# Patient Record
Sex: MALE | Race: BLACK OR AFRICAN AMERICAN | Employment: FULL TIME | URBAN - METROPOLITAN AREA
[De-identification: names, ages, dates, MRNs, and addresses within clinical notes are randomized per-mention and may not be internally consistent; named-entity substitution may affect disease eponyms.]

---

## 2017-09-01 ENCOUNTER — OFFICE VISIT (OUTPATIENT)
Dept: FAMILY MEDICINE CLINIC | Age: 47
End: 2017-09-01

## 2017-09-01 VITALS
TEMPERATURE: 98 F | OXYGEN SATURATION: 96 % | BODY MASS INDEX: 44.1 KG/M2 | HEART RATE: 96 BPM | RESPIRATION RATE: 18 BRPM | WEIGHT: 315 LBS | SYSTOLIC BLOOD PRESSURE: 148 MMHG | DIASTOLIC BLOOD PRESSURE: 96 MMHG | HEIGHT: 71 IN

## 2017-09-01 DIAGNOSIS — Z01.818 PRE-OP EXAMINATION: Primary | ICD-10-CM

## 2017-09-01 PROCEDURE — 99242 OFF/OP CONSLTJ NEW/EST SF 20: CPT | Performed by: INTERNAL MEDICINE

## 2017-09-01 ASSESSMENT — PATIENT HEALTH QUESTIONNAIRE - PHQ9
SUM OF ALL RESPONSES TO PHQ9 QUESTIONS 1 & 2: 0
SUM OF ALL RESPONSES TO PHQ QUESTIONS 1-9: 0
2. FEELING DOWN, DEPRESSED OR HOPELESS: 0
1. LITTLE INTEREST OR PLEASURE IN DOING THINGS: 0

## 2017-09-05 ENCOUNTER — TELEPHONE (OUTPATIENT)
Dept: FAMILY MEDICINE CLINIC | Age: 47
End: 2017-09-05

## 2017-10-13 ENCOUNTER — OFFICE VISIT (OUTPATIENT)
Dept: FAMILY MEDICINE CLINIC | Age: 47
End: 2017-10-13

## 2017-10-13 VITALS
WEIGHT: 315 LBS | SYSTOLIC BLOOD PRESSURE: 136 MMHG | HEART RATE: 86 BPM | OXYGEN SATURATION: 97 % | RESPIRATION RATE: 16 BRPM | DIASTOLIC BLOOD PRESSURE: 80 MMHG | HEIGHT: 71 IN | BODY MASS INDEX: 44.1 KG/M2

## 2017-10-13 DIAGNOSIS — N52.02 CORPORO-VENOUS OCCLUSIVE ERECTILE DYSFUNCTION: Primary | ICD-10-CM

## 2017-10-13 DIAGNOSIS — G47.33 OBSTRUCTIVE SLEEP APNEA SYNDROME: ICD-10-CM

## 2017-10-13 DIAGNOSIS — I10 ESSENTIAL HYPERTENSION: ICD-10-CM

## 2017-10-13 PROCEDURE — 99214 OFFICE O/P EST MOD 30 MIN: CPT | Performed by: INTERNAL MEDICINE

## 2017-10-13 RX ORDER — SILDENAFIL 100 MG/1
100 TABLET, FILM COATED ORAL PRN
Qty: 10 TABLET | Refills: 3 | Status: SHIPPED | OUTPATIENT
Start: 2017-10-13 | End: 2020-01-10 | Stop reason: ALTCHOICE

## 2017-10-13 ASSESSMENT — ENCOUNTER SYMPTOMS
GASTROINTESTINAL NEGATIVE: 1
ALLERGIC/IMMUNOLOGIC NEGATIVE: 1
RESPIRATORY NEGATIVE: 1

## 2017-10-13 NOTE — PATIENT INSTRUCTIONS
All above problems reviewed and the found to be unchanged except for the following :     Corporo-Venous Occlusive Erectile Dysfunction  - Will try Sildenafil 100 mg as needed. To call if no improvement after 3 tries, to then do Free Morning Testosterone level, if normal then will change BP meds. Obstructive Sleep Apnea Syndrome. Continue wt loss. Essential Hypertension. Continue home BP checks. Call if >140/90.

## 2017-12-04 ENCOUNTER — OFFICE VISIT (OUTPATIENT)
Dept: FAMILY MEDICINE CLINIC | Age: 47
End: 2017-12-04

## 2017-12-04 VITALS
BODY MASS INDEX: 44.1 KG/M2 | SYSTOLIC BLOOD PRESSURE: 128 MMHG | HEART RATE: 81 BPM | WEIGHT: 315 LBS | HEIGHT: 71 IN | RESPIRATION RATE: 18 BRPM | DIASTOLIC BLOOD PRESSURE: 86 MMHG | OXYGEN SATURATION: 96 %

## 2017-12-04 DIAGNOSIS — N52.02 CORPORO-VENOUS OCCLUSIVE ERECTILE DYSFUNCTION: ICD-10-CM

## 2017-12-04 DIAGNOSIS — G47.33 OBSTRUCTIVE SLEEP APNEA SYNDROME: ICD-10-CM

## 2017-12-04 DIAGNOSIS — I10 ESSENTIAL HYPERTENSION: Primary | ICD-10-CM

## 2017-12-04 DIAGNOSIS — E66.01 MORBID OBESITY DUE TO EXCESS CALORIES (HCC): ICD-10-CM

## 2017-12-04 DIAGNOSIS — E78.00 PURE HYPERCHOLESTEROLEMIA: ICD-10-CM

## 2017-12-04 LAB
ALBUMIN SERPL-MCNC: 4.3 G/DL (ref 3.4–5)
ALP BLD-CCNC: 81 U/L (ref 40–129)
ALT SERPL-CCNC: 28 U/L (ref 10–40)
ANION GAP SERPL CALCULATED.3IONS-SCNC: 15 MMOL/L (ref 3–16)
AST SERPL-CCNC: 16 U/L (ref 15–37)
BILIRUB SERPL-MCNC: 0.5 MG/DL (ref 0–1)
BILIRUBIN DIRECT: <0.2 MG/DL (ref 0–0.3)
BILIRUBIN, INDIRECT: NORMAL MG/DL (ref 0–1)
BUN BLDV-MCNC: 14 MG/DL (ref 7–20)
CALCIUM SERPL-MCNC: 9.1 MG/DL (ref 8.3–10.6)
CHLORIDE BLD-SCNC: 102 MMOL/L (ref 99–110)
CHOLESTEROL, TOTAL: 190 MG/DL (ref 0–199)
CO2: 26 MMOL/L (ref 21–32)
CREAT SERPL-MCNC: 0.8 MG/DL (ref 0.9–1.3)
GFR AFRICAN AMERICAN: >60
GFR NON-AFRICAN AMERICAN: >60
GLUCOSE BLD-MCNC: 105 MG/DL (ref 70–99)
HDLC SERPL-MCNC: 44 MG/DL (ref 40–60)
LDL CHOLESTEROL CALCULATED: 134 MG/DL
POTASSIUM SERPL-SCNC: 4.1 MMOL/L (ref 3.5–5.1)
SODIUM BLD-SCNC: 143 MMOL/L (ref 136–145)
TOTAL PROTEIN: 7 G/DL (ref 6.4–8.2)
TRIGL SERPL-MCNC: 61 MG/DL (ref 0–150)
TSH SERPL DL<=0.05 MIU/L-ACNC: 1.39 UIU/ML (ref 0.27–4.2)
VLDLC SERPL CALC-MCNC: 12 MG/DL

## 2017-12-04 PROCEDURE — 36415 COLL VENOUS BLD VENIPUNCTURE: CPT | Performed by: INTERNAL MEDICINE

## 2017-12-04 PROCEDURE — 99214 OFFICE O/P EST MOD 30 MIN: CPT | Performed by: INTERNAL MEDICINE

## 2017-12-04 ASSESSMENT — ENCOUNTER SYMPTOMS
ALLERGIC/IMMUNOLOGIC NEGATIVE: 1
RESPIRATORY NEGATIVE: 1
GASTROINTESTINAL NEGATIVE: 1

## 2017-12-04 NOTE — PROGRESS NOTES
Subjective:      Patient ID: Yumiko Scott is a 56 y.o. male. HPI  Essential hypertension  No change in meds, no c/o with meds, no chest pain, SOB, palpatations, or syncope. Home bp was 130s/80s most of time    Obstructive sleep apnea syndrome   Hasn't done testing. Morbid obesity due to excess calories (HCC)  No real change in diet and wt up a few lbs. Corporo-venous occlusive erectile dysfunction  Problem w/ erection and maintaining erection started ~ 1 mo ago. Started on Viagra and DOING WELL. History reviewed. No pertinent past medical history. Current Outpatient Prescriptions   Medication Sig Dispense Refill    sildenafil (VIAGRA) 100 MG tablet Take 1 tablet by mouth as needed for Erectile Dysfunction 10 tablet 3    metoprolol tartrate (LOPRESSOR) 25 MG tablet Take 1 tablet by mouth 2 times daily 60 tablet 5     No current facility-administered medications for this visit. No Known Allergies  Social History   Substance Use Topics    Smoking status: Former Smoker     Packs/day: 0.50     Years: 7.00     Types: Cigars, Cigarettes    Smokeless tobacco: Never Used      Comment: light cigar smoker/ 1 every month or so,    Alcohol use 0.0 oz/week      Comment: occasional     Family History   Problem Relation Age of Onset    Heart Disease Father     High Blood Pressure Paternal Aunt     High Blood Pressure Paternal Uncle     Cancer Maternal Grandmother          Review of Systems   Constitutional: Negative. Respiratory: Negative. Cardiovascular: Negative. Gastrointestinal: Negative. Endocrine: Negative. Musculoskeletal: Negative. Allergic/Immunologic: Negative. Neurological: Negative. Hematological: Negative. Psychiatric/Behavioral: Negative.       Vitals:    12/04/17 0812 12/04/17 0828   BP: 124/86 128/86   Pulse: 81    Resp: 18    SpO2: 96%    Weight: (!) 331 lb 3.2 oz (150.2 kg)    Height: 5' 11\" (1.803 m)      Objective:   Physical Exam   Constitutional: He is oriented to person, place, and time. Vital signs are normal. He appears well-developed and well-nourished. No distress. HENT:   Head: Normocephalic and atraumatic. Eyes: Conjunctivae and EOM are normal. Pupils are equal, round, and reactive to light. Neck: Trachea normal, normal range of motion, full passive range of motion without pain and phonation normal. Neck supple. Normal carotid pulses, no hepatojugular reflux and no JVD present. Carotid bruit is not present. No thyroid mass and no thyromegaly present. Cardiovascular: Normal rate, regular rhythm, normal heart sounds, intact distal pulses and normal pulses. No extrasystoles are present. PMI is not displaced. Exam reveals no gallop and no friction rub. No murmur heard. Pulses:       Carotid pulses are 2+ on the right side, and 2+ on the left side. Radial pulses are 2+ on the right side, and 2+ on the left side. Femoral pulses are 2+ on the right side, and 2+ on the left side. Popliteal pulses are 2+ on the right side, and 2+ on the left side. Dorsalis pedis pulses are 2+ on the right side, and 2+ on the left side. Posterior tibial pulses are 2+ on the right side, and 2+ on the left side. Pulmonary/Chest: Effort normal and breath sounds normal. No accessory muscle usage. No apnea, no tachypnea and no bradypnea. No respiratory distress. He has no decreased breath sounds. He has no wheezes. He has no rhonchi. He has no rales. Abdominal: Normal appearance and normal aorta. There is no hepatosplenomegaly. There is no CVA tenderness. No hernia. Hernia confirmed negative in the ventral area. Musculoskeletal: He exhibits tenderness (L knee some better after surgery). Neurological: He is alert and oriented to person, place, and time. He has normal strength. He is not disoriented. He displays no atrophy and no tremor. No cranial nerve deficit or sensory deficit. He exhibits normal muscle tone.  He displays a negative

## 2017-12-04 NOTE — PATIENT INSTRUCTIONS
All above problems reviewed and the found to be unchanged except for the following :     Corporo-Venous Occlusive Erectile Dysfunction. Continue Viagra. Obstructive Sleep Apnea Syndrome. Schedule Sleep Test.        Morbid Obesity Due to Excess Calories (Hcc). Discussed options. To call if needs further assistance. Essential Hypertension. Continue present meds. Home BP checks. Call if>140/90. Improve diet. Avoid caffeine and salt. Call if new c/o w/ meds.

## 2018-01-02 ENCOUNTER — TELEPHONE (OUTPATIENT)
Dept: FAMILY MEDICINE CLINIC | Age: 48
End: 2018-01-02

## 2018-01-02 NOTE — TELEPHONE ENCOUNTER
C/o sore throat, coughing up mucous and congestion. Started Friday and getting progressively worse. taking otc meds w/o relief  I scheduled an appt for tomorrow at 9 explaining that we do not give antibiotics w.o evaluating in office

## 2018-01-02 NOTE — TELEPHONE ENCOUNTER
Patient called stating he has a Sinus infection starting Friday and getting worse. I advised patient that he needs to be evaluated in the office. He declined. Stating that he would just like something called in. I told him that the chances were not likely that we would just call in a medication. He would like a message put back anyway since he was just seen.  Please advise [phone]

## 2018-01-03 ENCOUNTER — OFFICE VISIT (OUTPATIENT)
Dept: FAMILY MEDICINE CLINIC | Age: 48
End: 2018-01-03

## 2018-01-03 VITALS
HEART RATE: 90 BPM | TEMPERATURE: 97.8 F | SYSTOLIC BLOOD PRESSURE: 132 MMHG | HEIGHT: 71 IN | OXYGEN SATURATION: 98 % | DIASTOLIC BLOOD PRESSURE: 88 MMHG | BODY MASS INDEX: 44.1 KG/M2 | WEIGHT: 315 LBS | RESPIRATION RATE: 18 BRPM

## 2018-01-03 DIAGNOSIS — J02.9 SORE THROAT: ICD-10-CM

## 2018-01-03 DIAGNOSIS — J06.9 VIRAL URI: Primary | ICD-10-CM

## 2018-01-03 LAB — S PYO AG THROAT QL: NORMAL

## 2018-01-03 PROCEDURE — 87880 STREP A ASSAY W/OPTIC: CPT | Performed by: INTERNAL MEDICINE

## 2018-01-03 PROCEDURE — 99212 OFFICE O/P EST SF 10 MIN: CPT | Performed by: INTERNAL MEDICINE

## 2018-01-03 ASSESSMENT — ENCOUNTER SYMPTOMS
EYES NEGATIVE: 1
VOICE CHANGE: 0
SORE THROAT: 1
RHINORRHEA: 1
WHEEZING: 0
CHEST TIGHTNESS: 0
SHORTNESS OF BREATH: 0
ALLERGIC/IMMUNOLOGIC NEGATIVE: 1
STRIDOR: 0
TROUBLE SWALLOWING: 0
COUGH: 1
SINUS PRESSURE: 0
SINUS PAIN: 0
APNEA: 0
CHOKING: 0

## 2018-01-03 NOTE — PROGRESS NOTES
Subjective:      Patient ID: Sourav Main is a 56 y.o. male. HPI  Viral URI  Congestion and drainage w/ mild ST for ~3-4 days. Some better W Mucinex but worse at night. Productive. Sneezing. History reviewed. No pertinent past medical history. Current Outpatient Prescriptions   Medication Sig Dispense Refill    sildenafil (VIAGRA) 100 MG tablet Take 1 tablet by mouth as needed for Erectile Dysfunction 10 tablet 3    metoprolol tartrate (LOPRESSOR) 25 MG tablet Take 1 tablet by mouth 2 times daily 60 tablet 5     No current facility-administered medications for this visit. No Known Allergies  Social History   Substance Use Topics    Smoking status: Former Smoker     Packs/day: 0.50     Years: 7.00     Types: Cigars, Cigarettes    Smokeless tobacco: Never Used      Comment: light cigar smoker/ 1 every month or so,    Alcohol use 0.0 oz/week      Comment: occasional     Family History   Problem Relation Age of Onset    Heart Disease Father     High Blood Pressure Paternal Aunt     High Blood Pressure Paternal Uncle     Cancer Maternal Grandmother        Review of Systems   Constitutional: Negative. HENT: Positive for congestion, postnasal drip, rhinorrhea, sneezing and sore throat. Negative for ear discharge, ear pain, sinus pain, sinus pressure, tinnitus, trouble swallowing and voice change. Eyes: Negative. Respiratory: Positive for cough. Negative for apnea, choking, chest tightness, shortness of breath, wheezing and stridor. Cardiovascular: Negative. Allergic/Immunologic: Negative. Hematological: Negative. Objective:   Physical Exam   Constitutional: He is oriented to person, place, and time. Vital signs are normal. He appears well-developed and well-nourished. No distress. HENT:   Head: Normocephalic and atraumatic. Right Ear: External ear normal.   Left Ear: External ear normal.   Nose: Nose normal.   Mouth/Throat: No oropharyngeal exudate.    3+ drainage

## 2019-06-04 ENCOUNTER — OFFICE VISIT (OUTPATIENT)
Dept: FAMILY MEDICINE CLINIC | Age: 49
End: 2019-06-04
Payer: COMMERCIAL

## 2019-06-04 VITALS
BODY MASS INDEX: 44.1 KG/M2 | HEIGHT: 71 IN | HEART RATE: 74 BPM | SYSTOLIC BLOOD PRESSURE: 132 MMHG | RESPIRATION RATE: 16 BRPM | OXYGEN SATURATION: 99 % | DIASTOLIC BLOOD PRESSURE: 92 MMHG | WEIGHT: 315 LBS

## 2019-06-04 DIAGNOSIS — I10 ESSENTIAL HYPERTENSION: ICD-10-CM

## 2019-06-04 DIAGNOSIS — G47.33 OBSTRUCTIVE SLEEP APNEA SYNDROME: ICD-10-CM

## 2019-06-04 DIAGNOSIS — R79.89 PROLACTIN INCREASED: ICD-10-CM

## 2019-06-04 DIAGNOSIS — E66.01 MORBID OBESITY DUE TO EXCESS CALORIES (HCC): ICD-10-CM

## 2019-06-04 DIAGNOSIS — G43.709 CHRONIC MIGRAINE WITHOUT AURA WITHOUT STATUS MIGRAINOSUS, NOT INTRACTABLE: ICD-10-CM

## 2019-06-04 DIAGNOSIS — N63.0 BREAST MASS IN MALE: ICD-10-CM

## 2019-06-04 DIAGNOSIS — N62 GYNECOMASTIA, MALE: ICD-10-CM

## 2019-06-04 DIAGNOSIS — R53.83 FATIGUE, UNSPECIFIED TYPE: ICD-10-CM

## 2019-06-04 DIAGNOSIS — E78.2 MIXED HYPERLIPIDEMIA: ICD-10-CM

## 2019-06-04 DIAGNOSIS — Z00.00 ANNUAL PHYSICAL EXAM: Primary | ICD-10-CM

## 2019-06-04 LAB
ALBUMIN SERPL-MCNC: 4.5 G/DL (ref 3.4–5)
ALP BLD-CCNC: 91 U/L (ref 40–129)
ALT SERPL-CCNC: 19 U/L (ref 10–40)
ANION GAP SERPL CALCULATED.3IONS-SCNC: 11 MMOL/L (ref 3–16)
AST SERPL-CCNC: 14 U/L (ref 15–37)
BILIRUB SERPL-MCNC: 0.6 MG/DL (ref 0–1)
BILIRUBIN DIRECT: <0.2 MG/DL (ref 0–0.3)
BILIRUBIN, INDIRECT: ABNORMAL MG/DL (ref 0–1)
BUN BLDV-MCNC: 17 MG/DL (ref 7–20)
CALCIUM SERPL-MCNC: 9.3 MG/DL (ref 8.3–10.6)
CHLORIDE BLD-SCNC: 106 MMOL/L (ref 99–110)
CHOLESTEROL, TOTAL: 178 MG/DL (ref 0–199)
CO2: 27 MMOL/L (ref 21–32)
CREAT SERPL-MCNC: 1 MG/DL (ref 0.9–1.3)
GFR AFRICAN AMERICAN: >60
GFR NON-AFRICAN AMERICAN: >60
GLUCOSE BLD-MCNC: 100 MG/DL (ref 70–99)
HCT VFR BLD CALC: 42.4 % (ref 40.5–52.5)
HDLC SERPL-MCNC: 43 MG/DL (ref 40–60)
HEMOGLOBIN: 14.1 G/DL (ref 13.5–17.5)
LDL CHOLESTEROL CALCULATED: 121 MG/DL
MCH RBC QN AUTO: 29.9 PG (ref 26–34)
MCHC RBC AUTO-ENTMCNC: 33.4 G/DL (ref 31–36)
MCV RBC AUTO: 89.6 FL (ref 80–100)
PDW BLD-RTO: 14.9 % (ref 12.4–15.4)
PHOSPHORUS: 3.4 MG/DL (ref 2.5–4.9)
PLATELET # BLD: 250 K/UL (ref 135–450)
PMV BLD AUTO: 9.3 FL (ref 5–10.5)
POTASSIUM SERPL-SCNC: 4.4 MMOL/L (ref 3.5–5.1)
PROLACTIN: 7.1 NG/ML
RBC # BLD: 4.73 M/UL (ref 4.2–5.9)
SODIUM BLD-SCNC: 144 MMOL/L (ref 136–145)
TOTAL PROTEIN: 7.3 G/DL (ref 6.4–8.2)
TRIGL SERPL-MCNC: 71 MG/DL (ref 0–150)
TSH SERPL DL<=0.05 MIU/L-ACNC: 1.55 UIU/ML (ref 0.27–4.2)
VLDLC SERPL CALC-MCNC: 14 MG/DL
WBC # BLD: 5.5 K/UL (ref 4–11)

## 2019-06-04 PROCEDURE — 99396 PREV VISIT EST AGE 40-64: CPT | Performed by: INTERNAL MEDICINE

## 2019-06-04 PROCEDURE — 90471 IMMUNIZATION ADMIN: CPT | Performed by: INTERNAL MEDICINE

## 2019-06-04 PROCEDURE — 90715 TDAP VACCINE 7 YRS/> IM: CPT | Performed by: INTERNAL MEDICINE

## 2019-06-04 PROCEDURE — 36415 COLL VENOUS BLD VENIPUNCTURE: CPT | Performed by: INTERNAL MEDICINE

## 2019-06-04 ASSESSMENT — PATIENT HEALTH QUESTIONNAIRE - PHQ9
SUM OF ALL RESPONSES TO PHQ QUESTIONS 1-9: 0
2. FEELING DOWN, DEPRESSED OR HOPELESS: 0
SUM OF ALL RESPONSES TO PHQ9 QUESTIONS 1 & 2: 0
SUM OF ALL RESPONSES TO PHQ QUESTIONS 1-9: 0
1. LITTLE INTEREST OR PLEASURE IN DOING THINGS: 0

## 2019-06-04 ASSESSMENT — ENCOUNTER SYMPTOMS
EYES NEGATIVE: 1
RESPIRATORY NEGATIVE: 1
GASTROINTESTINAL NEGATIVE: 1
ALLERGIC/IMMUNOLOGIC NEGATIVE: 1

## 2019-06-04 NOTE — PATIENT INSTRUCTIONS
All above problems reviewed and the found to be unchanged except for the following :     Annual Physical Exam. Tdap today. Obstructive Sleep Apnea Syndrome. Referred to Sleep clinic. Chronic Migraine Without Aura Without Status Migrainosus, Not Intractable. To call if new c/o. Morbid Obesity Due to Excess Calories (Hcc). Discussed options for wt loss. To call if needs further assistance. Discussed Weight Watchers. Essential Hypertension. Home BP checks daily for 2 weeks. Call w/ results. Call if>140/90. Improve diet. Avoid caffeine and salt. Call if new c/o w/ meds. Breast mass. Will do diagnostic mammo and US of L Breast.          Prostate: na. Nocturia x3. Colonoscopy: na  DEXA: na  Eye: 5/2019. Hearing: passed in office exam  Immunization: past due for Tdap.  Flu shot in Fall  MMSE: na  Get Up and Go: na  Tob: nonsmoker/ quit 2011/ 4 pk yr  ETOH: 1-2 Drinks/week  Caffeine: moderate am  Cardiac Risk Assessment: labs pending  Living will: yes  Medical power of : no

## 2019-06-04 NOTE — ASSESSMENT & PLAN NOTE
Prostate: na. Nocturia x3. Colonoscopy: na  DEXA: na  Eye: 5/2019. Hearing: passed in office exam  Immunization: past due for Tdap.  Flu shot in Fall  MMSE: na  Get Up and Go: na  Tob: nonsmoker/ quit 2011/ 4 pk yr  ETOH: 1-2 Drinks/week  Caffeine: moderate am  Cardiac Risk Assessment: labs pending  Living will: yes  Medical power of : no

## 2019-06-04 NOTE — ASSESSMENT & PLAN NOTE
No meds at present, no chest pain, SOB, palpatations, or syncope. Home bp was 130s/80s most of time. High here today.

## 2019-06-04 NOTE — PROGRESS NOTES
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 HPI  Annual physical exam  Prostate: na. Nocturia x3. Colonoscopy: na  DEXA: na  Eye: 5/2019. Hearing: passed in office exam  Immunization: past due for Tdap. Flu shot in Fall  MMSE: na  Get Up and Go: na  Tob: nonsmoker/ quit 2011/ 4 pk yr  ETOH: 1-2 Drinks/week  Caffeine: moderate am  Cardiac Risk Assessment: labs pending  Living will: yes  Medical power of : no    Chronic migraine without aura without status migrainosus, not intractable  No migraines since last visit. Morbid obesity due to excess calories (HCC)  No real change in diet and wt up a few lbs. Essential hypertension  No meds at present, no chest pain, SOB, palpatations, or syncope. Home bp was 130s/80s most of time. High here today. Obstructive sleep apnea syndrome   Hasn't done testing. Declines referral.    History reviewed. No pertinent past medical history. Current Outpatient Medications   Medication Sig Dispense Refill    sildenafil (VIAGRA) 100 MG tablet Take 1 tablet by mouth as needed for Erectile Dysfunction 10 tablet 3     No current facility-administered medications for this visit. No Known Allergies  Social History     Tobacco Use    Smoking status: Former Smoker     Packs/day: 0.50     Years: 7.00     Pack years: 3.50     Types: Cigars, Cigarettes    Smokeless tobacco: Never Used    Tobacco comment: light cigar smoker/ 1 every month or so,   Substance Use Topics    Alcohol use: Yes     Alcohol/week: 0.0 oz     Comment: occasional     Family History   Problem Relation Age of Onset    Heart Disease Father     High Blood Pressure Paternal Aunt     High Blood Pressure Paternal Uncle     Cancer Maternal Grandmother        Review of Systems   Constitutional: Negative. HENT: Negative. Eyes: Negative. Respiratory: Negative. Cardiovascular: Negative. Gastrointestinal: Negative. Endocrine: Negative. Genitourinary: Negative. Musculoskeletal: Negative. Skin: Negative. Allergic/Immunologic: Negative. Neurological: Negative. Hematological: Negative. Psychiatric/Behavioral: Negative. Vitals:    06/04/19 0914 06/04/19 0947   BP: (!) 136/92 (!) 132/92   Pulse: 74    Resp: 16    SpO2: 99%    Weight: (!) 337 lb 3.2 oz (153 kg)    Height: 5' 11\" (1.803 m)      Objective:   Physical Exam   Constitutional: He is oriented to person, place, and time. Vital signs are normal. He appears well-developed and well-nourished. Non-toxic appearance. He does not have a sickly appearance. He does not appear ill. No distress. Obese   HENT:   Head: Normocephalic and atraumatic. Right Ear: Hearing, tympanic membrane, external ear and ear canal normal.   Left Ear: Hearing, tympanic membrane, external ear and ear canal normal.   Nose: Nose normal. Right sinus exhibits no maxillary sinus tenderness and no frontal sinus tenderness. Left sinus exhibits no maxillary sinus tenderness and no frontal sinus tenderness. Mouth/Throat: Uvula is midline, oropharynx is clear and moist and mucous membranes are normal. No oropharyngeal exudate. Huge tonsils    Eyes: Pupils are equal, round, and reactive to light. Conjunctivae, EOM and lids are normal. Right eye exhibits no discharge. Left eye exhibits no discharge. No scleral icterus. Fundoscopic exam:       The right eye shows no arteriolar narrowing, no AV nicking, no exudate, no hemorrhage and no papilledema. The right eye shows no red reflex and no venous pulsations. The left eye shows no arteriolar narrowing, no AV nicking, no exudate, no hemorrhage and no papilledema. The left eye shows no red reflex and no venous pulsations. Glasses. Neck: Trachea normal, normal range of motion and full passive range of motion without pain. Neck supple. Normal carotid pulses, no hepatojugular reflux and no JVD present. Carotid bruit is not present. No tracheal deviation present.  No thyroid mass and no thyromegaly present. Cardiovascular: Normal rate, regular rhythm, normal heart sounds, intact distal pulses and normal pulses. No extrasystoles are present. PMI is not displaced. Exam reveals no gallop, no friction rub and no decreased pulses. No murmur heard. Pulses:       Carotid pulses are 2+ on the right side, and 2+ on the left side. Radial pulses are 2+ on the right side, and 2+ on the left side. Femoral pulses are 2+ on the right side, and 2+ on the left side. Popliteal pulses are 2+ on the right side, and 2+ on the left side. Dorsalis pedis pulses are 2+ on the right side, and 2+ on the left side. Posterior tibial pulses are 2+ on the right side, and 2+ on the left side. Pulmonary/Chest: Effort normal and breath sounds normal. No accessory muscle usage or stridor. No apnea, no tachypnea and no bradypnea. No respiratory distress. He has no decreased breath sounds. He has no wheezes. He has no rhonchi. He has no rales. He exhibits no tenderness. L Breast mass 4 o'clock just below nipple. Palp and painful. Gynecomastia. Abdominal: Soft. Normal appearance, normal aorta and bowel sounds are normal. He exhibits no shifting dullness, no distension, no pulsatile liver, no fluid wave, no abdominal bruit, no ascites, no pulsatile midline mass and no mass. There is no hepatosplenomegaly. There is no tenderness. There is no rebound, no guarding and no CVA tenderness. No hernia. Hernia confirmed negative in the ventral area, confirmed negative in the right inguinal area and confirmed negative in the left inguinal area. Genitourinary: Rectum normal, testes normal and penis normal. Rectal exam shows guaiac negative stool. Cremasteric reflex is present. No penile tenderness. Genitourinary Comments: Mild BPH. Musculoskeletal: Normal range of motion. He exhibits no edema, tenderness or deformity.    Lymphadenopathy:        Head (right side): No submental, no submandibular, no tonsillar, no preauricular, no posterior auricular and no occipital adenopathy present. Head (left side): No submental, no submandibular, no tonsillar, no preauricular, no posterior auricular and no occipital adenopathy present. He has no cervical adenopathy. He has no axillary adenopathy. Right: No inguinal, no supraclavicular and no epitrochlear adenopathy present. Left: No inguinal, no supraclavicular and no epitrochlear adenopathy present. Neurological: He is alert and oriented to person, place, and time. He has normal strength and normal reflexes. He is not disoriented. He displays no atrophy, no tremor and normal reflexes. No cranial nerve deficit or sensory deficit. He exhibits normal muscle tone. He displays a negative Romberg sign. He displays no seizure activity. Coordination and gait normal. He displays no Babinski's sign on the right side. He displays no Babinski's sign on the left side. Reflex Scores:       Tricep reflexes are 2+ on the right side and 2+ on the left side. Bicep reflexes are 2+ on the right side and 2+ on the left side. Brachioradialis reflexes are 2+ on the right side and 2+ on the left side. Patellar reflexes are 2+ on the right side and 2+ on the left side. Achilles reflexes are 2+ on the right side and 2+ on the left side. Skin: Skin is warm, dry and intact. No abrasion and no rash noted. He is not diaphoretic. No cyanosis or erythema. No pallor. Nails show no clubbing. Psychiatric: He has a normal mood and affect. His speech is normal and behavior is normal. Judgment and thought content normal. Cognition and memory are normal.       Assessment:      Problem   Annual Physical Exam   Obstructive Sleep Apnea Syndrome. discussed   Chronic Migraine Without Aura Without Status Migrainosus, Not Intractable. No new c/o. Morbid Obesity Due to Excess Calories (Hcc). Wt up some   Essential Hypertension.  Off meds and borderline. L Breast Mass. Probably Hematoma. Plan:      All above problems reviewed and the found to be unchanged except for the following :     Annual Physical Exam. Tdap today. Obstructive Sleep Apnea Syndrome. Referred to Sleep clinic. Chronic Migraine Without Aura Without Status Migrainosus, Not Intractable. To call if new c/o. Morbid Obesity Due to Excess Calories (Hcc). Discussed options for wt loss. To call if needs further assistance. Discussed Weight Watchers. Essential Hypertension. Home BP checks daily for 2 weeks. Call w/ results. Call if>140/90. Improve diet. Avoid caffeine and salt. Call if new c/o w/ meds. Breast mass.  Will do diagnostic mammo and US of Tyler Quick MD

## 2019-06-05 ENCOUNTER — TELEPHONE (OUTPATIENT)
Dept: FAMILY MEDICINE CLINIC | Age: 49
End: 2019-06-05

## 2019-06-05 NOTE — TELEPHONE ENCOUNTER
Called in requesting results for the following:  What results: labs     Date of service:6/4    Returning call from [place].  Please f/u @ [phone]

## 2019-08-26 ENCOUNTER — OFFICE VISIT (OUTPATIENT)
Dept: PULMONOLOGY | Age: 49
End: 2019-08-26
Payer: COMMERCIAL

## 2019-08-26 VITALS
DIASTOLIC BLOOD PRESSURE: 88 MMHG | HEIGHT: 71 IN | HEART RATE: 84 BPM | WEIGHT: 315 LBS | BODY MASS INDEX: 44.1 KG/M2 | TEMPERATURE: 98.2 F | OXYGEN SATURATION: 98 % | RESPIRATION RATE: 16 BRPM | SYSTOLIC BLOOD PRESSURE: 136 MMHG

## 2019-08-26 DIAGNOSIS — G47.10 HYPERSOMNIA: ICD-10-CM

## 2019-08-26 DIAGNOSIS — G47.30 OBSERVED SLEEP APNEA: Primary | ICD-10-CM

## 2019-08-26 DIAGNOSIS — E66.01 MORBID OBESITY (HCC): ICD-10-CM

## 2019-08-26 DIAGNOSIS — I10 ESSENTIAL HYPERTENSION: ICD-10-CM

## 2019-08-26 DIAGNOSIS — R53.83 FATIGUE, UNSPECIFIED TYPE: ICD-10-CM

## 2019-08-26 DIAGNOSIS — R06.83 SNORING: ICD-10-CM

## 2019-08-26 PROCEDURE — 99244 OFF/OP CNSLTJ NEW/EST MOD 40: CPT | Performed by: INTERNAL MEDICINE

## 2019-08-26 ASSESSMENT — SLEEP AND FATIGUE QUESTIONNAIRES
HOW LIKELY ARE YOU TO NOD OFF OR FALL ASLEEP WHILE SITTING QUIETLY AFTER LUNCH WITHOUT ALCOHOL: 0
HOW LIKELY ARE YOU TO NOD OFF OR FALL ASLEEP WHEN YOU ARE A PASSENGER IN A CAR FOR AN HOUR WITHOUT A BREAK: 0
ESS TOTAL SCORE: 6
HOW LIKELY ARE YOU TO NOD OFF OR FALL ASLEEP IN A CAR, WHILE STOPPED FOR A FEW MINUTES IN TRAFFIC: 0
HOW LIKELY ARE YOU TO NOD OFF OR FALL ASLEEP WHILE SITTING INACTIVE IN A PUBLIC PLACE: 0
HOW LIKELY ARE YOU TO NOD OFF OR FALL ASLEEP WHILE WATCHING TV: 2
HOW LIKELY ARE YOU TO NOD OFF OR FALL ASLEEP WHILE SITTING AND READING: 1
NECK CIRCUMFERENCE (INCHES): 17.5
HOW LIKELY ARE YOU TO NOD OFF OR FALL ASLEEP WHILE SITTING AND TALKING TO SOMEONE: 0
HOW LIKELY ARE YOU TO NOD OFF OR FALL ASLEEP WHILE LYING DOWN TO REST IN THE AFTERNOON WHEN CIRCUMSTANCES PERMIT: 3

## 2019-10-23 ENCOUNTER — HOSPITAL ENCOUNTER (OUTPATIENT)
Dept: SLEEP CENTER | Age: 49
Discharge: HOME | End: 2019-10-25
Payer: COMMERCIAL

## 2019-10-23 DIAGNOSIS — R06.83 SNORING: ICD-10-CM

## 2019-10-23 DIAGNOSIS — G47.10 HYPERSOMNIA: ICD-10-CM

## 2019-10-23 DIAGNOSIS — E66.01 MORBID OBESITY (HCC): ICD-10-CM

## 2019-10-23 DIAGNOSIS — R53.83 FATIGUE, UNSPECIFIED TYPE: ICD-10-CM

## 2019-10-23 DIAGNOSIS — G47.30 OBSERVED SLEEP APNEA: ICD-10-CM

## 2019-10-23 PROCEDURE — 95806 SLEEP STUDY UNATT&RESP EFFT: CPT

## 2019-10-30 ENCOUNTER — TELEPHONE (OUTPATIENT)
Dept: PULMONOLOGY | Age: 49
End: 2019-10-30

## 2020-01-10 ENCOUNTER — OFFICE VISIT (OUTPATIENT)
Dept: FAMILY MEDICINE CLINIC | Age: 50
End: 2020-01-10
Payer: COMMERCIAL

## 2020-01-10 VITALS
DIASTOLIC BLOOD PRESSURE: 88 MMHG | HEIGHT: 71 IN | WEIGHT: 315 LBS | RESPIRATION RATE: 16 BRPM | SYSTOLIC BLOOD PRESSURE: 138 MMHG | BODY MASS INDEX: 44.1 KG/M2 | HEART RATE: 93 BPM | OXYGEN SATURATION: 99 %

## 2020-01-10 PROBLEM — E78.00 PURE HYPERCHOLESTEROLEMIA: Status: ACTIVE | Noted: 2020-01-10

## 2020-01-10 PROCEDURE — 99214 OFFICE O/P EST MOD 30 MIN: CPT | Performed by: INTERNAL MEDICINE

## 2020-01-10 ASSESSMENT — ENCOUNTER SYMPTOMS
ALLERGIC/IMMUNOLOGIC NEGATIVE: 1
RESPIRATORY NEGATIVE: 1
GASTROINTESTINAL NEGATIVE: 1

## 2020-01-10 ASSESSMENT — PATIENT HEALTH QUESTIONNAIRE - PHQ9
SUM OF ALL RESPONSES TO PHQ9 QUESTIONS 1 & 2: 0
1. LITTLE INTEREST OR PLEASURE IN DOING THINGS: 0
2. FEELING DOWN, DEPRESSED OR HOPELESS: 0
SUM OF ALL RESPONSES TO PHQ QUESTIONS 1-9: 0
SUM OF ALL RESPONSES TO PHQ QUESTIONS 1-9: 0

## 2020-01-10 NOTE — PROGRESS NOTES
Normal appearance. He is well-developed. He is not diaphoretic. HENT:      Head: Normocephalic and atraumatic. Neck:      Musculoskeletal: Full passive range of motion without pain, normal range of motion and neck supple. Thyroid: No thyroid mass or thyromegaly. Vascular: Normal carotid pulses. No carotid bruit, hepatojugular reflux or JVD. Trachea: Trachea and phonation normal.   Cardiovascular:      Rate and Rhythm: Normal rate and regular rhythm. No extrasystoles are present. Chest Wall: PMI is not displaced. Pulses: Normal pulses. No decreased pulses. Carotid pulses are 2+ on the right side and 2+ on the left side. Radial pulses are 2+ on the right side and 2+ on the left side. Femoral pulses are 2+ on the right side and 2+ on the left side. Popliteal pulses are 2+ on the right side and 2+ on the left side. Dorsalis pedis pulses are 2+ on the right side and 2+ on the left side. Posterior tibial pulses are 2+ on the right side and 2+ on the left side. Heart sounds: Normal heart sounds. No murmur. No friction rub. No gallop. Pulmonary:      Effort: Pulmonary effort is normal. No tachypnea, bradypnea, accessory muscle usage or respiratory distress. Breath sounds: Normal breath sounds. No decreased breath sounds, wheezing, rhonchi or rales. Abdominal:      Hernia: No hernia is present. There is no hernia in the ventral area. Skin:     General: Skin is warm and dry. Coloration: Skin is not pale. Findings: No abrasion or rash. Nails: There is no clubbing. Neurological:      Mental Status: He is alert and oriented to person, place, and time. He is not disoriented. Cranial Nerves: No cranial nerve deficit. Sensory: No sensory deficit. Motor: No tremor, atrophy or abnormal muscle tone.       Coordination: Coordination normal.   Psychiatric:         Speech: Speech normal.         Behavior: Behavior normal.         Thought Content: Thought content normal.         Judgment: Judgment normal.         Assessment:      Problem   Pure Hypercholesterolemia. High LDL in past.   Obstructive Sleep Apnea Syndrome. Did test but no C-pap   Morbid Obesity Due to Excess Calories (Hcc). No wt loss   Essential Hypertension. Home bp 130s/80s. Plan:      All above problems reviewed and the found to be unchanged except for the following :     Pure Hypercholesterolemia. To improve diet and lose weight. Obstructive Sleep Apnea Syndrome. To begin using C-pap. Lose  Weight. Morbid Obesity Due to Excess Calories (Hcc). Must improve diet and exercise. Must lose some weight. Essential Hypertension. Home BP checks. Call if>140/90. Improve diet. Avoid caffeine and salt. Call if new c/o. Due for Colonoscopy.           Brad Thomas MD

## 2020-01-10 NOTE — PATIENT INSTRUCTIONS
All above problems reviewed and the found to be unchanged except for the following :     Pure Hypercholesterolemia. To improve diet and lose weight. Obstructive Sleep Apnea Syndrome. To begin using C-pap. Lose  Weight. Morbid Obesity Due to Excess Calories (Hcc). Must improve diet and exercise. Must lose some weight. Essential Hypertension. Home BP checks. Call if>140/90. Improve diet. Avoid caffeine and salt. Call if new c/o. Due for Colonoscopy.

## 2020-01-17 ENCOUNTER — TELEPHONE (OUTPATIENT)
Dept: GASTROENTEROLOGY | Age: 50
End: 2020-01-17

## 2020-01-17 RX ORDER — POLYETHYLENE GLYCOL 3350 17 G/17G
POWDER, FOR SOLUTION ORAL
Qty: 238 G | Refills: 0 | Status: SHIPPED | OUTPATIENT
Start: 2020-01-17 | End: 2020-05-04 | Stop reason: ALTCHOICE

## 2020-01-17 NOTE — TELEPHONE ENCOUNTER
Called pt's insurance to initiate a pre-certification for pt's colonoscopy on 1/20/2020. After speaking with the representative, they informed me that there was no need for a pre-certification.

## 2020-01-17 NOTE — TELEPHONE ENCOUNTER
Patient is scheduled for colon 1/20/20 with Dr. Agus Costello [facility] @ 9. However a different physician had the 9 blocked for [place] prior to this patient being schedule. Patient has to be moved to 11:30. I left a very detailed message on VM with the time changes and prep changes. Informed patient to return the call with any further questions.

## 2020-01-20 ENCOUNTER — ANESTHESIA (OUTPATIENT)
Dept: ENDOSCOPY | Age: 50
End: 2020-01-20
Payer: COMMERCIAL

## 2020-01-20 ENCOUNTER — HOSPITAL ENCOUNTER (OUTPATIENT)
Age: 50
Setting detail: OUTPATIENT SURGERY
Discharge: HOME | End: 2020-01-20
Attending: INTERNAL MEDICINE | Admitting: INTERNAL MEDICINE
Payer: COMMERCIAL

## 2020-01-20 ENCOUNTER — ANESTHESIA EVENT (OUTPATIENT)
Dept: ENDOSCOPY | Age: 50
End: 2020-01-20
Payer: COMMERCIAL

## 2020-01-20 VITALS
RESPIRATION RATE: 16 BRPM | OXYGEN SATURATION: 98 % | HEIGHT: 71 IN | TEMPERATURE: 97.8 F | DIASTOLIC BLOOD PRESSURE: 100 MMHG | SYSTOLIC BLOOD PRESSURE: 151 MMHG | WEIGHT: 315 LBS | BODY MASS INDEX: 44.1 KG/M2 | HEART RATE: 79 BPM

## 2020-01-20 VITALS — DIASTOLIC BLOOD PRESSURE: 101 MMHG | SYSTOLIC BLOOD PRESSURE: 161 MMHG | OXYGEN SATURATION: 98 %

## 2020-01-20 PROCEDURE — 6360000002 HC RX W HCPCS: Performed by: NURSE ANESTHETIST, CERTIFIED REGISTERED

## 2020-01-20 PROCEDURE — 2580000003 HC RX 258: Performed by: INTERNAL MEDICINE

## 2020-01-20 PROCEDURE — 3609027000 HC COLONOSCOPY: Performed by: INTERNAL MEDICINE

## 2020-01-20 PROCEDURE — 7100000010 HC PHASE II RECOVERY - FIRST 15 MIN: Performed by: INTERNAL MEDICINE

## 2020-01-20 PROCEDURE — 7100000011 HC PHASE II RECOVERY - ADDTL 15 MIN: Performed by: INTERNAL MEDICINE

## 2020-01-20 PROCEDURE — 3700000000 HC ANESTHESIA ATTENDED CARE: Performed by: INTERNAL MEDICINE

## 2020-01-20 PROCEDURE — 2709999900 HC NON-CHARGEABLE SUPPLY: Performed by: INTERNAL MEDICINE

## 2020-01-20 PROCEDURE — 3700000001 HC ADD 15 MINUTES (ANESTHESIA): Performed by: INTERNAL MEDICINE

## 2020-01-20 PROCEDURE — 45378 DIAGNOSTIC COLONOSCOPY: CPT | Performed by: INTERNAL MEDICINE

## 2020-01-20 RX ORDER — MORPHINE SULFATE 2 MG/ML
2 INJECTION, SOLUTION INTRAMUSCULAR; INTRAVENOUS EVERY 5 MIN PRN
Status: DISCONTINUED | OUTPATIENT
Start: 2020-01-20 | End: 2020-01-20 | Stop reason: HOSPADM

## 2020-01-20 RX ORDER — ONDANSETRON 2 MG/ML
4 INJECTION INTRAMUSCULAR; INTRAVENOUS
Status: DISCONTINUED | OUTPATIENT
Start: 2020-01-20 | End: 2020-01-20 | Stop reason: HOSPADM

## 2020-01-20 RX ORDER — MORPHINE SULFATE 2 MG/ML
1 INJECTION, SOLUTION INTRAMUSCULAR; INTRAVENOUS EVERY 5 MIN PRN
Status: DISCONTINUED | OUTPATIENT
Start: 2020-01-20 | End: 2020-01-20 | Stop reason: HOSPADM

## 2020-01-20 RX ORDER — OXYCODONE HYDROCHLORIDE AND ACETAMINOPHEN 5; 325 MG/1; MG/1
2 TABLET ORAL PRN
Status: DISCONTINUED | OUTPATIENT
Start: 2020-01-20 | End: 2020-01-20 | Stop reason: HOSPADM

## 2020-01-20 RX ORDER — SODIUM CHLORIDE, SODIUM LACTATE, POTASSIUM CHLORIDE, CALCIUM CHLORIDE 600; 310; 30; 20 MG/100ML; MG/100ML; MG/100ML; MG/100ML
INJECTION, SOLUTION INTRAVENOUS CONTINUOUS
Status: DISCONTINUED | OUTPATIENT
Start: 2020-01-20 | End: 2020-01-20 | Stop reason: HOSPADM

## 2020-01-20 RX ORDER — OXYCODONE HYDROCHLORIDE AND ACETAMINOPHEN 5; 325 MG/1; MG/1
1 TABLET ORAL PRN
Status: DISCONTINUED | OUTPATIENT
Start: 2020-01-20 | End: 2020-01-20 | Stop reason: HOSPADM

## 2020-01-20 RX ORDER — HYDRALAZINE HYDROCHLORIDE 20 MG/ML
5 INJECTION INTRAMUSCULAR; INTRAVENOUS EVERY 10 MIN PRN
Status: DISCONTINUED | OUTPATIENT
Start: 2020-01-20 | End: 2020-01-20 | Stop reason: HOSPADM

## 2020-01-20 RX ORDER — PROPOFOL 10 MG/ML
INJECTION, EMULSION INTRAVENOUS PRN
Status: DISCONTINUED | OUTPATIENT
Start: 2020-01-20 | End: 2020-01-20 | Stop reason: SDUPTHER

## 2020-01-20 RX ORDER — LABETALOL HYDROCHLORIDE 5 MG/ML
5 INJECTION, SOLUTION INTRAVENOUS EVERY 10 MIN PRN
Status: DISCONTINUED | OUTPATIENT
Start: 2020-01-20 | End: 2020-01-20 | Stop reason: HOSPADM

## 2020-01-20 RX ORDER — DIPHENHYDRAMINE HYDROCHLORIDE 50 MG/ML
12.5 INJECTION INTRAMUSCULAR; INTRAVENOUS
Status: DISCONTINUED | OUTPATIENT
Start: 2020-01-20 | End: 2020-01-20 | Stop reason: HOSPADM

## 2020-01-20 RX ORDER — PROMETHAZINE HYDROCHLORIDE 25 MG/ML
6.25 INJECTION, SOLUTION INTRAMUSCULAR; INTRAVENOUS
Status: DISCONTINUED | OUTPATIENT
Start: 2020-01-20 | End: 2020-01-20 | Stop reason: HOSPADM

## 2020-01-20 RX ADMIN — PROPOFOL 250 MG: 10 INJECTION, EMULSION INTRAVENOUS at 12:11

## 2020-01-20 RX ADMIN — SODIUM CHLORIDE, POTASSIUM CHLORIDE, SODIUM LACTATE AND CALCIUM CHLORIDE: 600; 310; 30; 20 INJECTION, SOLUTION INTRAVENOUS at 11:41

## 2020-01-20 RX ADMIN — SODIUM CHLORIDE, POTASSIUM CHLORIDE, SODIUM LACTATE AND CALCIUM CHLORIDE: 600; 310; 30; 20 INJECTION, SOLUTION INTRAVENOUS at 11:50

## 2020-01-20 ASSESSMENT — PAIN - FUNCTIONAL ASSESSMENT: PAIN_FUNCTIONAL_ASSESSMENT: 0-10

## 2020-01-20 ASSESSMENT — PAIN SCALES - GENERAL: PAINLEVEL_OUTOF10: 0

## 2020-01-20 NOTE — PROGRESS NOTES
Discharge instructions reviewed with patient/responsible adult and understanding verbalized. Discharge instructions signed and copies given. Patient to be discharged home with belongings. Wife at bedside. Passing air.

## 2020-01-20 NOTE — H&P
[address] ,  [address] [facility]  Phone: [phone]   TGP:[phone]  [address] [place] [address] [address],  [address] [facility], [facility] [place]  Phone: [phone]    HPI     Thank you Wilfrido Villaseñor MD for asking me to see Johanna Ackerman in consultation. He is a  [2] Black [2] 51 y.o. . male seen independently who presents with the following GI complaints:    Johanna Ackerman presents for screening colonoscopy. There is no history of colon polyps or cancer. There is no family history of colon polyps or cancer. He is not experiencing any symptoms presently. Last Encounter Reviewed:   Pertinent PMH, FH, SH is reviewed below. Last EGD: none  Last Colonoscopy: none    No components found for: [address]   History reviewed. No pertinent past medical history. FAMILY HISTORY     Family History   Problem Relation Age of Onset    Heart Disease Father     High Blood Pressure Paternal Aunt     High Blood Pressure Paternal Uncle     Cancer Mother         myeloma    Cancer Maternal Grandmother      SOCIAL HISTORY     Social History     Socioeconomic History    Marital status:      Spouse name: Not on file    Number of children: Not on file    Years of education: Not on file    Highest education level: Not on file   Occupational History    Not on file   Social Needs    Financial resource strain: Not on file    Food insecurity:     Worry: Not on file     Inability: Not on file    Transportation needs:     Medical: Not on file     Non-medical: Not on file   Tobacco Use    Smoking status: Former Smoker     Packs/day: 0.50     Years: 7.00     Pack years: 3.50     Types: Cigars, Cigarettes     Last attempt to quit: 2001     Years since quittin.0    Smokeless tobacco: Never Used    Tobacco comment: light cigar smoker/ 1 every month or so,   Substance and Sexual Activity    Alcohol use:  Yes sores, and voice change. Respiratory: Negative for cough, choking and chest tightness. Cardiovascular: Negative for chest pain   Gastrointestinal: See HPI  Musculoskeletal: Negative for arthralgias. Skin: Negative for pallor. Neurological: Negative for weakness and light-headedness. Hematological: Negative for adenopathy. Does not bruise/bleed easily. Psychiatric/Behavioral: Negative for suicidal ideas. PHYSICAL EXAM   VITAL SIGNS:  Vitals:    01/20/20 1130   BP: (!) 165/106   Pulse: 84   Resp: 20   Temp: 97.8 °F (36.6 °C)   SpO2: 100%     Wt Readings from Last 3 Encounters:   01/20/20 (!) 348 lb (157.9 kg)   01/10/20 (!) 348 lb 6.4 oz (158 kg)   08/26/19 (!) 347 lb (157.4 kg)     Constitutional: Well developed, Well nourished, No acute distress, Non-toxic appearance. HENT: Normocephalic, Atraumatic, Bilateral external ears normal, Oropharynx moist, No oral exudates, Nose normal.   Eyes: Conjunctiva normal, No discharge. Neck: Normal range of motion, No tenderness, Supple, No stridor. Lymphatic: No lymphadenopathy noted. Cardiovascular: Normal heart rate, Normal rhythm, No murmurs, No rubs, No gallops. Thorax & Lungs: Normal breath sounds, No respiratory distress, No wheezing, No chest tenderness. Abdomen: scars consistent with stated surgeries,  Soft NTND   Rectal:  Deferred. Skin: Warm, Dry, No erythema, No rash. Back: No tenderness, No CVA tenderness. Extremities: Intact distal pulses, No edema, No tenderness, No cyanosis, No clubbing. Neurologic: Alert & oriented x 3, Normal motor function, Normal sensory function, No focal deficits noted.    RADIOLOGY/PROCEDURES     Reviewed per [facility]    [facility] reviewed per epic    FOLLOWUP     Screening Colonoscopy          Margaretta Lanes R D[address] 1/20/20 12:00 PM    CC:  Joanne Hammonds MD

## 2020-01-20 NOTE — ANESTHESIA PRE PROCEDURE
Department of Anesthesiology  Preprocedure Note       Name:  Tiffanie Mc   Age:  51 y.o.  :  1970                                          MRN:  [id number]         Date:  2020      Surgeon: Aurora Spangler):  Suresh Simons MD    Procedure: COLONOSCOPY W/ ANESTHESIA (N/A )    Medications prior to admission:   Prior to Admission medications    Medication Sig Start Date End Date Taking? Authorizing Provider   polyethylene glycol (GLYCOLAX) powder Use as directed for colonoscopy prep 20  Yes Suresh Simons MD   bisacodyl (BISACODYL) 5 MG EC tablet Take all 4 tablets day before colonoscopy 20  Yes Suresh Simons MD   Loratadine-Pseudoephedrine (CLARITIN-D 12 HOUR PO) Take by mouth   Yes Historical Provider, MD       Current medications:    Current Facility-Administered Medications   Medication Dose Route Frequency Provider Last Rate Last Dose    lactated ringers infusion   Intravenous Continuous Suresh Simons MD 50 mL/hr at 20 1141         Allergies:  No Known Allergies    Problem List:    Patient Active Problem List   Diagnosis Code    Chronic migraine without aura without status migrainosus, not intractable G43.709    Morbid obesity due to excess calories (HCC) E66.01    Essential hypertension I10    Slow transit constipation K59.01    Obstructive sleep apnea syndrome G47.33    Corporo-venous occlusive erectile dysfunction N52.02    Viral URI J06.9    Pure hypercholesterolemia E78.00       Past Medical History:  History reviewed. No pertinent past medical history.     Past Surgical History:        Procedure Laterality Date    APPENDECTOMY      KNEE ARTHROSCOPY Left        Social History:    Social History     Tobacco Use    Smoking status: Former Smoker     Packs/day: 0.50     Years: 7.00     Pack years: 3.50     Types: Cigars, Cigarettes     Last attempt to quit: 2001     Years since quittin.0    Smokeless tobacco: Never Used    Tobacco comment: light hours.    Coags: No results found for: PROTIME, INR, APTT    HCG (If Applicable): No results found for: PREGTESTUR, PREGSERUM, HCG, HCGQUANT     ABGs: No results found for: PHART, PO2ART, ANN0MWK, DZW9ZMI, BEART, A0CRNFWM     Type & Screen (If Applicable):  No results found for: LABABO, LABRH    Anesthesia Evaluation   no history of anesthetic complications:   Airway: Mallampati: II  TM distance: >3 FB   Neck ROM: full  Mouth opening: > = 3 FB Dental: normal exam         Pulmonary:   (+) sleep apnea:                            ROS comment: H/o tob   Cardiovascular:    (+) hypertension:, hyperlipidemia                  Neuro/Psych:   (+) headaches: migraine headaches,             GI/Hepatic/Renal:   (+) morbid obesity          Endo/Other: Negative Endo/Other ROS                    Abdominal:           Vascular: negative vascular ROS. Anesthesia Plan      general     ASA 3     (Risks, benefits and alternatives of GA discussed with pt. Questions answered. Willing to proceed.)  Induction: intravenous. Anesthetic plan and risks discussed with patient and spouse.                       Blas Bower MD   1/20/2020

## 2020-01-20 NOTE — OP NOTE
The quality of the colonic preparation was good. A careful inspection was made as the colonoscope was withdrawn, including a retroflexed view of the rectum; findings and interventions are described below. Appropriate photodocumentation Was Obtained. Findings: -normal colonic mucosa throughout  - PREP: miralax  - Overall difficulty: mild in degree  - Abdominal pressure: no  - Change in position: no  - Anesthesia issues: no  - Medivator use: no    Specimens: Was Not Obtained    Complications:   None; patient tolerated the procedure well. Disposition:   PACU - hemodynamically stable. Estimated Blood loss:  none    Withdrawal Time:  7 minutes    Impression:   -Normal colonoscopy to the cecum, with no evidence of neoplasia, diverticular disease, or mucosal abnormality. Recommendations:Screening colonoscopy 10 years.         Sebas Dior 1/20/20 12:28 PM

## 2020-02-07 ENCOUNTER — TELEPHONE (OUTPATIENT)
Dept: PULMONOLOGY | Age: 50
End: 2020-02-07

## 2020-02-07 NOTE — TELEPHONE ENCOUNTER
Pt called in response of the letter he received. Pt is wanting to get set up on APAP machine. Stated that he is going to call his insurance company to check on covered DME. Please advise. LOV: 8/26/19  Assessment:       · Snoring  · Observed sleep apnea   · Hypersomnia   · Fatigue   · Nocturia-could be related to sleep apnea  · Morbid obesity   · Essential hypertension      Plan:    · HST evaluate for sleep related breathing disorder. · Treatment options were discussed with patient if HST reveals RUBI, including CPAP therapy, oral appliances, upper airway surgery and hypoglossal nerve stimulation. · APAP min pressure of 8 and max pressure of 16 cmH2O if positive HST   · Discussed with patient the pathophysiology of apnea. · Sleep hygiene  · Treatment of RUBI can lower blood pressure by levels that are clinically significant. · Avoid sedatives, alcohol and caffeinated drinks at bed time. · No driving motorized vehicles or operating heavy machinery while fatigue, drowsy or sleepy. · Weight loss is also recommended as a long-term intervention.     · Complications of RUBI if not treated were discussed with patient patient to include systemic hypertension, pulmonary hypertension, cardiovascular morbidities, car accidents and all cause mortality.

## 2020-04-28 ENCOUNTER — TELEPHONE (OUTPATIENT)
Dept: PULMONOLOGY | Age: 50
End: 2020-04-28

## 2020-05-04 ENCOUNTER — VIRTUAL VISIT (OUTPATIENT)
Dept: PULMONOLOGY | Age: 50
End: 2020-05-04
Payer: COMMERCIAL

## 2020-05-04 VITALS — HEIGHT: 71 IN | WEIGHT: 315 LBS | BODY MASS INDEX: 44.1 KG/M2

## 2020-05-04 PROCEDURE — 99214 OFFICE O/P EST MOD 30 MIN: CPT | Performed by: INTERNAL MEDICINE

## 2020-05-04 ASSESSMENT — SLEEP AND FATIGUE QUESTIONNAIRES
HOW LIKELY ARE YOU TO NOD OFF OR FALL ASLEEP WHILE SITTING QUIETLY AFTER LUNCH WITHOUT ALCOHOL: 0
ESS TOTAL SCORE: 1
HOW LIKELY ARE YOU TO NOD OFF OR FALL ASLEEP WHILE SITTING INACTIVE IN A PUBLIC PLACE: 0
HOW LIKELY ARE YOU TO NOD OFF OR FALL ASLEEP WHILE SITTING AND READING: 0
HOW LIKELY ARE YOU TO NOD OFF OR FALL ASLEEP IN A CAR, WHILE STOPPED FOR A FEW MINUTES IN TRAFFIC: 0
HOW LIKELY ARE YOU TO NOD OFF OR FALL ASLEEP WHILE WATCHING TV: 0
HOW LIKELY ARE YOU TO NOD OFF OR FALL ASLEEP WHEN YOU ARE A PASSENGER IN A CAR FOR AN HOUR WITHOUT A BREAK: 0
HOW LIKELY ARE YOU TO NOD OFF OR FALL ASLEEP WHILE LYING DOWN TO REST IN THE AFTERNOON WHEN CIRCUMSTANCES PERMIT: 1
HOW LIKELY ARE YOU TO NOD OFF OR FALL ASLEEP WHILE SITTING AND TALKING TO SOMEONE: 0

## 2020-05-04 NOTE — PROGRESS NOTES
P [facility]                                                       TELEHEALTH EVALUATION: Service performed was Audio/Visual (During CNQIK-14 public health emergency) and not a face-to-face visit             CHIEF COMPLAINT: Follow-up RUBI      HPI:   HST was reviewed by me and noted below. Results were dicussed with patient and multiple good questions were answered. Started CPAP and it is helping. Wife loves it. More energy during the day. Patient is using CPAP 6-7  hrs/night. Using humidifier. No snoring on CPAP. The pressure is well tolerated. The mask is comfortable- full face mask. No mask leak. No significant daytime sleepiness. No nodding off when driving. Bedtime is 11-12 pm and rise time is 8 am. Sleep onset is 10-15 minutes. ESS is 1. PMH:  None     Past Surgical History:        Procedure Laterality Date    APPENDECTOMY      COLONOSCOPY N/A 1/20/2020    COLONOSCOPY W/ ANESTHESIA performed by Erik Schwartz MD at [address] ARTHROSCOPY Left        Allergies:  has No Known Allergies. Social History:    TOBACCO:   reports that he quit smoking about 19 years ago. His smoking use included cigars and cigarettes. He has a 3.50 pack-year smoking history. He has never used smokeless tobacco.  ETOH:   reports current alcohol use of about 1.0 - 2.0 standard drinks of alcohol per week. Family History:       Problem Relation Age of Onset    Heart Disease Father     High Blood Pressure Paternal Aunt     High Blood Pressure Paternal Uncle     Cancer Mother         myeloma    Cancer Maternal Grandmother        Current Medications:    Current Outpatient Medications:     Loratadine-Pseudoephedrine (CLARITIN-D 12 HOUR PO), Take by mouth, Disp: , Rfl:         Objective:   PHYSICAL EXAM:    Height 5' 11\" (1.803 m), weight (!) 340 lb (154.2 kg). ' on RA  O2 Sat:  HR:  BP:  RR:  Temperature:  Constitutional:  No acute distress. Appears well developed and nourished. Eyes: No sclera icterus. EOM intact. No visible discharge. HENT: Head is normocephalic and atraumatic. Mucus membranes are moist and the tongue appears normal. Normal appearing nose. External Ears normal.   Neck: No visualized mass. Cydne Parisian is midline   Resp: No accessory muscle use. Respiratory effort normal. No visualized signs of difficulty breathing or respiratory distress. Cardiovascular: No LE edema. Musculoskeletal: Normal gait with no signs of ataxia. Normal range of motion of the neck. Skin: No significant exanthematous lesions or discoloration noted on facial skin    Neuro: Awake. Alert. Able to follow commands. No facial asymmetry. No gaze palsy. Psych: No agitation. Normal affect. No hallucinations. Oriented to person/time/place. No anxiety. Normal judgement and insight. DATA reviewed by me:   HST 10/23/2019 AHI 57.4 and desaturation to 64%. Saturation below 89% and 90 minutes      CPAP data 04/02-05/01 2020 reviewed by me. Uses 6-7 hrs/night with 87% compliance and AHI of  1. <90% pressure of 11.8 cmH2O, Mean pressure of 9.9 cmH2O      Assessment:       · Severe RUBI. APAP 8-16 cmH2O. Optimal compliance and efficacy upon review today. · Nocturnal hypoxemia -secondary to RUBI   · Hypersomnia fatigue- better    · Nocturia- much better   · Morbid obesity   · Essential hypertension      Plan:    · Change APAP 10-14 cmH2O  · Advised to use CPAP 6-8 hrs at night and during naps. · Replacement of mask, tubing, head straps every 3-6 months or sooner if damaged. · Follow up CPAP compliance and pressure adjustment if needed  · Sleep hygiene  · Avoid sedatives, alcohol and caffeinated drinks at bed time. · No driving motorized vehicles or operating heavy machinery while fatigue, drowsy or sleepy. · Weight loss is also recommended as a long-term intervention.     · Complications of RUBI if not treated were discussed with

## 2020-10-01 ENCOUNTER — TELEPHONE (OUTPATIENT)
Dept: FAMILY MEDICINE CLINIC | Age: 50
End: 2020-10-01

## 2020-10-01 ENCOUNTER — VIRTUAL VISIT (OUTPATIENT)
Dept: FAMILY MEDICINE CLINIC | Age: 50
End: 2020-10-01
Payer: COMMERCIAL

## 2020-10-01 PROCEDURE — 99212 OFFICE O/P EST SF 10 MIN: CPT | Performed by: INTERNAL MEDICINE

## 2020-10-01 ASSESSMENT — ENCOUNTER SYMPTOMS
RESPIRATORY NEGATIVE: 1
EYES NEGATIVE: 1
RHINORRHEA: 1
ALLERGIC/IMMUNOLOGIC NEGATIVE: 1
GASTROINTESTINAL NEGATIVE: 1

## 2020-10-01 NOTE — TELEPHONE ENCOUNTER
Patient has the following symptoms:  Fever, congestion, sinus/allergy, fatigue, cough, chills. No virtual appts available, he is not active on My Chart for an E-visit. [facility]?

## 2020-10-01 NOTE — PATIENT INSTRUCTIONS
ASSESSMENT/PLAN:  1. Viral URI  DayQuil/NyQuil, to call if fever goes back up, SOB, or feels not improving. If worsens to get COVID-19 test. If negative may need labs and x-ray. RTSM as needed.

## 2020-10-01 NOTE — ASSESSMENT & PLAN NOTE
Fever~101 2 days ago but down now. Dry cough, Congestion, Poor appetite but seems to be getting some better. Taking Dayquil/NyQuil for last 2 days. No loss of taste, no diarrhea, no SOB, no CP.

## 2020-10-01 NOTE — PROGRESS NOTES
10/1/2020    TELEHEALTH EVALUATION -- Audio/Visual (During PGKGI-99 public health emergency)    HPI:    Angelica Chopra (:  1970) has requested an audio/video evaluation for the following concern(s):    Viral URI  Fever~101 2 days ago but down now. Dry cough, Congestion, Poor appetite but seems to be getting some better. Taking Dayquil/NyQuil for last 2 days. No loss of taste, no diarrhea, no SOB, no CP. Review of Systems   Constitutional: Positive for appetite change and fatigue. HENT: Positive for congestion and rhinorrhea. Eyes: Negative. Respiratory: Negative. Cardiovascular: Negative. Gastrointestinal: Negative. Endocrine: Negative. Genitourinary: Negative. Musculoskeletal: Positive for myalgias. Allergic/Immunologic: Negative. Neurological: Positive for weakness. Hematological: Negative. Prior to Visit Medications    Medication Sig Taking? Authorizing Provider   Loratadine-Pseudoephedrine (CLARITIN-D 12 HOUR PO) Take by mouth  Historical Provider, MD       Social History     Tobacco Use    Smoking status: Former Smoker     Packs/day: 0.50     Years: 7.00     Pack years: 3.50     Types: Cigars, Cigarettes     Last attempt to quit: 2001     Years since quittin.7    Smokeless tobacco: Never Used    Tobacco comment: light cigar smoker/ 1 every month or so,   Substance Use Topics    Alcohol use:  Yes     Alcohol/week: 1.0 - 2.0 standard drinks     Types: 1 - 2 Shots of liquor per week     Comment: occasional    Drug use: No        No Known Allergies,   Past Medical History:   Diagnosis Date    Severe obstructive sleep apnea    ,   Past Surgical History:   Procedure Laterality Date    APPENDECTOMY      COLONOSCOPY N/A 2020    COLONOSCOPY W/ ANESTHESIA performed by Leo Arias MD at [address] ARTHROSCOPY Left    ,   Family History   Problem Relation Age of Onset    Heart Disease Father     High Blood Pressure Paternal Aunt     High Blood Pressure Paternal Uncle     Cancer Mother         myeloma    Cancer Maternal Grandmother    ,   Immunization History   Administered Date(s) Administered    Tdap (Boostrix, Adacel) 06/04/2019   ,   Health Maintenance   Topic Date Due    HIV screen  07/27/1986    Potassium monitoring  06/04/2020    Creatinine monitoring  06/04/2020    Flu vaccine (1) 09/01/2020    Lipid screen  06/04/2024    DTaP/Tdap/Td vaccine (2 - Td) 06/04/2029    Hepatitis A vaccine  Aged Out    Hepatitis B vaccine  Aged Out    Hib vaccine  Aged Out    Meningococcal (ACWY) vaccine  Aged Out    Pneumococcal 0-64 years Vaccine  Aged Out       PHYSICAL EXAMINATION:  [ INSTRUCTIONS:  \"[x]\" Indicates a positive item  \"[]\" Indicates a negative item  -- DELETE ALL ITEMS NOT EXAMINED]  Vital Signs: per pt    Blood pressure-  Heart rate- na   Respiratory rate-12    Temperature- 98.2  Pulse oximetry- na    Constitutional: [x] Appears well-developed and well-nourished [x] No apparent distress      [] Abnormal-   Mental status  [x] Alert and awake  [x] Oriented to person/place/time [x]Able to follow commands      Eyes:  EOM    [x]  Normal  [] Abnormal-  Sclera  [x]  Normal  [] Abnormal -         Discharge [x]  None visible  [] Abnormal -    HENT:   [x] Normocephalic, atraumatic.   [] Abnormal   [] Mouth/Throat: Mucous membranes are moist.     External Ears [x] Normal  [] Abnormal-     Neck: [x] No visualized mass     Pulmonary/Chest: [x] Respiratory effort normal.  [x] No visualized signs of difficulty breathing or respiratory distress        [] Abnormal-      Musculoskeletal:   [x] Normal gait with no signs of ataxia         [x] Normal range of motion of neck        [] Abnormal-       Neurological:        [] No Facial Asymmetry (Cranial nerve 7 motor function) (limited exam to video visit)          [x] No gaze palsy        [] Abnormal-         Skin:        [x] No significant exanthematous lesions or discoloration noted on facial skin         [] Abnormal-            Psychiatric:       [x] Normal Affect [x] No Hallucinations        [] Abnormal-     Other pertinent observable physical exam findings-     ASSESSMENT/PLAN:  1. Viral URI  DayQuil/NyQuil, to call if fever goes back up, SOB, or feels not improving. If worsens to get COVID-19 test. If negative may need labs and x-ray. RTSM as needed. Adrián Degroot is a 53 y.o. male being evaluated by a Virtual Visit (video visit) encounter to address concerns as mentioned above. A caregiver was present when appropriate. Due to this being a TeleHealth encounter (During [facility]- public health emergency), evaluation of the following organ systems was limited: Vitals/Constitutional/EENT/Resp/CV/GI//MS/Neuro/Skin/Heme-Lymph-Imm. Pursuant to the emergency declaration under the [address] [place] [address] authority and the [employer] and [employer] Act, this Virtual Visit was conducted with patient's (and/or legal guardian's) consent, to reduce the patient's risk of exposure to COVID-19 and provide necessary medical care. The patient (and/or legal guardian) has also been advised to contact this office for worsening conditions or problems, and seek emergency medical treatment and/or call 911 if deemed necessary. Patient identification was verified at the start of the visit: Yes    Total time spent on this encounter: Not billed by time    Services were provided through a video synchronous discussion virtually to substitute for in-person clinic visit. Patient and provider were located at their individual homes. --Mireya Garrett MD on 10/1/2020 at 5:23 PM    An electronic signature was used to authenticate this note.

## 2020-10-05 ENCOUNTER — HOSPITAL ENCOUNTER (INPATIENT)
Age: 50
LOS: 7 days | Discharge: HOME | DRG: 177 | End: 2020-10-13
Attending: EMERGENCY MEDICINE | Admitting: INTERNAL MEDICINE
Payer: COMMERCIAL

## 2020-10-05 ENCOUNTER — APPOINTMENT (OUTPATIENT)
Dept: CT IMAGING | Age: 50
DRG: 177 | End: 2020-10-05
Payer: COMMERCIAL

## 2020-10-05 ENCOUNTER — APPOINTMENT (OUTPATIENT)
Dept: GENERAL RADIOLOGY | Age: 50
DRG: 177 | End: 2020-10-05
Payer: COMMERCIAL

## 2020-10-05 PROBLEM — J18.9 PNA (PNEUMONIA): Status: ACTIVE | Noted: 2020-10-05

## 2020-10-05 LAB
A/G RATIO: 1.1 (ref 1.1–2.2)
ALBUMIN SERPL-MCNC: 3.8 G/DL (ref 3.4–5)
ALP BLD-CCNC: 68 U/L (ref 40–129)
ALT SERPL-CCNC: 19 U/L (ref 10–40)
ANION GAP SERPL CALCULATED.3IONS-SCNC: 10 MMOL/L (ref 3–16)
AST SERPL-CCNC: 23 U/L (ref 15–37)
BASE EXCESS VENOUS: -0.3 MMOL/L (ref -3–3)
BASOPHILS ABSOLUTE: 0 K/UL (ref 0–0.2)
BASOPHILS RELATIVE PERCENT: 0.5 %
BILIRUB SERPL-MCNC: 0.4 MG/DL (ref 0–1)
BUN BLDV-MCNC: 14 MG/DL (ref 7–20)
CALCIUM SERPL-MCNC: 8.1 MG/DL (ref 8.3–10.6)
CARBOXYHEMOGLOBIN: 2 % (ref 0–1.5)
CHLORIDE BLD-SCNC: 100 MMOL/L (ref 99–110)
CO2: 26 MMOL/L (ref 21–32)
CREAT SERPL-MCNC: 1 MG/DL (ref 0.9–1.3)
EOSINOPHILS ABSOLUTE: 0 K/UL (ref 0–0.6)
EOSINOPHILS RELATIVE PERCENT: 0 %
GFR AFRICAN AMERICAN: >60
GFR NON-AFRICAN AMERICAN: >60
GLOBULIN: 3.4 G/DL
GLUCOSE BLD-MCNC: 112 MG/DL (ref 70–99)
HCO3 VENOUS: 25.8 MMOL/L (ref 23–29)
HCT VFR BLD CALC: 41.4 % (ref 40.5–52.5)
HEMOGLOBIN: 13.9 G/DL (ref 13.5–17.5)
LACTIC ACID: 0.9 MMOL/L (ref 0.4–2)
LYMPHOCYTES ABSOLUTE: 0.9 K/UL (ref 1–5.1)
LYMPHOCYTES RELATIVE PERCENT: 23 %
MCH RBC QN AUTO: 29.2 PG (ref 26–34)
MCHC RBC AUTO-ENTMCNC: 33.6 G/DL (ref 31–36)
MCV RBC AUTO: 87 FL (ref 80–100)
METHEMOGLOBIN VENOUS: 0.6 %
MONOCYTES ABSOLUTE: 0.3 K/UL (ref 0–1.3)
MONOCYTES RELATIVE PERCENT: 7.7 %
NEUTROPHILS ABSOLUTE: 2.8 K/UL (ref 1.7–7.7)
NEUTROPHILS RELATIVE PERCENT: 68.8 %
O2 CONTENT, VEN: 16 VOL %
O2 SAT, VEN: 78 %
O2 THERAPY: ABNORMAL
PCO2, VEN: 47.7 MMHG (ref 40–50)
PDW BLD-RTO: 14.6 % (ref 12.4–15.4)
PH VENOUS: 7.35 (ref 7.35–7.45)
PLATELET # BLD: 237 K/UL (ref 135–450)
PMV BLD AUTO: 7.9 FL (ref 5–10.5)
PO2, VEN: 43.9 MMHG (ref 25–40)
POTASSIUM SERPL-SCNC: 3.5 MMOL/L (ref 3.5–5.1)
PRO-BNP: <5 PG/ML (ref 0–124)
RBC # BLD: 4.76 M/UL (ref 4.2–5.9)
SODIUM BLD-SCNC: 136 MMOL/L (ref 136–145)
TCO2 CALC VENOUS: 27 MMOL/L
TOTAL PROTEIN: 7.2 G/DL (ref 6.4–8.2)
TROPONIN: <0.01 NG/ML
WBC # BLD: 4.1 K/UL (ref 4–11)

## 2020-10-05 PROCEDURE — 96374 THER/PROPH/DIAG INJ IV PUSH: CPT

## 2020-10-05 PROCEDURE — 6360000002 HC RX W HCPCS: Performed by: PHYSICIAN ASSISTANT

## 2020-10-05 PROCEDURE — 71045 X-RAY EXAM CHEST 1 VIEW: CPT

## 2020-10-05 PROCEDURE — 83615 LACTATE (LD) (LDH) ENZYME: CPT

## 2020-10-05 PROCEDURE — 85730 THROMBOPLASTIN TIME PARTIAL: CPT

## 2020-10-05 PROCEDURE — 85025 COMPLETE CBC W/AUTO DIFF WBC: CPT

## 2020-10-05 PROCEDURE — 84484 ASSAY OF TROPONIN QUANT: CPT

## 2020-10-05 PROCEDURE — 85379 FIBRIN DEGRADATION QUANT: CPT

## 2020-10-05 PROCEDURE — 83605 ASSAY OF LACTIC ACID: CPT

## 2020-10-05 PROCEDURE — 6370000000 HC RX 637 (ALT 250 FOR IP): Performed by: PHYSICIAN ASSISTANT

## 2020-10-05 PROCEDURE — XW033E5 INTRODUCTION OF REMDESIVIR ANTI-INFECTIVE INTO PERIPHERAL VEIN, PERCUTANEOUS APPROACH, NEW TECHNOLOGY GROUP 5: ICD-10-PCS | Performed by: INTERNAL MEDICINE

## 2020-10-05 PROCEDURE — 85610 PROTHROMBIN TIME: CPT

## 2020-10-05 PROCEDURE — 36415 COLL VENOUS BLD VENIPUNCTURE: CPT

## 2020-10-05 PROCEDURE — 6370000000 HC RX 637 (ALT 250 FOR IP): Performed by: EMERGENCY MEDICINE

## 2020-10-05 PROCEDURE — 94761 N-INVAS EAR/PLS OXIMETRY MLT: CPT

## 2020-10-05 PROCEDURE — 83880 ASSAY OF NATRIURETIC PEPTIDE: CPT

## 2020-10-05 PROCEDURE — 71260 CT THORAX DX C+: CPT

## 2020-10-05 PROCEDURE — 86140 C-REACTIVE PROTEIN: CPT

## 2020-10-05 PROCEDURE — U0003 INFECTIOUS AGENT DETECTION BY NUCLEIC ACID (DNA OR RNA); SEVERE ACUTE RESPIRATORY SYNDROME CORONAVIRUS 2 (SARS-COV-2) (CORONAVIRUS DISEASE [COVID-19]), AMPLIFIED PROBE TECHNIQUE, MAKING USE OF HIGH THROUGHPUT TECHNOLOGIES AS DESCRIBED BY CMS-2020-01-R: HCPCS

## 2020-10-05 PROCEDURE — 99291 CRITICAL CARE FIRST HOUR: CPT

## 2020-10-05 PROCEDURE — 84145 PROCALCITONIN (PCT): CPT

## 2020-10-05 PROCEDURE — 94644 CONT INHLJ TX 1ST HOUR: CPT

## 2020-10-05 PROCEDURE — 82728 ASSAY OF FERRITIN: CPT

## 2020-10-05 PROCEDURE — 2580000003 HC RX 258: Performed by: PHYSICIAN ASSISTANT

## 2020-10-05 PROCEDURE — 93005 ELECTROCARDIOGRAM TRACING: CPT | Performed by: EMERGENCY MEDICINE

## 2020-10-05 PROCEDURE — 80053 COMPREHEN METABOLIC PANEL: CPT

## 2020-10-05 PROCEDURE — 85384 FIBRINOGEN ACTIVITY: CPT

## 2020-10-05 PROCEDURE — 6360000004 HC RX CONTRAST MEDICATION: Performed by: EMERGENCY MEDICINE

## 2020-10-05 PROCEDURE — 82803 BLOOD GASES ANY COMBINATION: CPT

## 2020-10-05 RX ORDER — IPRATROPIUM BROMIDE AND ALBUTEROL SULFATE 2.5; .5 MG/3ML; MG/3ML
3 SOLUTION RESPIRATORY (INHALATION) ONCE
Status: COMPLETED | OUTPATIENT
Start: 2020-10-05 | End: 2020-10-05

## 2020-10-05 RX ORDER — ACETAMINOPHEN 500 MG
1000 TABLET ORAL ONCE
Status: COMPLETED | OUTPATIENT
Start: 2020-10-05 | End: 2020-10-05

## 2020-10-05 RX ORDER — 0.9 % SODIUM CHLORIDE 0.9 %
1000 INTRAVENOUS SOLUTION INTRAVENOUS ONCE
Status: COMPLETED | OUTPATIENT
Start: 2020-10-05 | End: 2020-10-05

## 2020-10-05 RX ORDER — METHYLPREDNISOLONE SODIUM SUCCINATE 125 MG/2ML
125 INJECTION, POWDER, LYOPHILIZED, FOR SOLUTION INTRAMUSCULAR; INTRAVENOUS ONCE
Status: COMPLETED | OUTPATIENT
Start: 2020-10-05 | End: 2020-10-05

## 2020-10-05 RX ORDER — ACETAMINOPHEN 650 MG/1
650 SUPPOSITORY RECTAL EVERY 6 HOURS PRN
Status: DISCONTINUED | OUTPATIENT
Start: 2020-10-05 | End: 2020-10-13 | Stop reason: HOSPADM

## 2020-10-05 RX ORDER — DEXAMETHASONE 4 MG/1
6 TABLET ORAL DAILY
Status: DISCONTINUED | OUTPATIENT
Start: 2020-10-06 | End: 2020-10-13 | Stop reason: HOSPADM

## 2020-10-05 RX ORDER — ACETAMINOPHEN 325 MG/1
650 TABLET ORAL EVERY 6 HOURS PRN
Status: DISCONTINUED | OUTPATIENT
Start: 2020-10-05 | End: 2020-10-13 | Stop reason: HOSPADM

## 2020-10-05 RX ADMIN — METHYLPREDNISOLONE SODIUM SUCCINATE 125 MG: 125 INJECTION, POWDER, FOR SOLUTION INTRAMUSCULAR; INTRAVENOUS at 21:00

## 2020-10-05 RX ADMIN — ACETAMINOPHEN 1000 MG: 500 TABLET ORAL at 23:04

## 2020-10-05 RX ADMIN — IPRATROPIUM BROMIDE AND ALBUTEROL SULFATE 3 AMPULE: .5; 3 SOLUTION RESPIRATORY (INHALATION) at 21:11

## 2020-10-05 RX ADMIN — SODIUM CHLORIDE 1000 ML: 9 INJECTION, SOLUTION INTRAVENOUS at 22:19

## 2020-10-05 RX ADMIN — IOPAMIDOL 75 ML: 755 INJECTION, SOLUTION INTRAVENOUS at 22:55

## 2020-10-05 ASSESSMENT — PAIN SCALES - GENERAL
PAINLEVEL_OUTOF10: 0
PAINLEVEL_OUTOF10: 0

## 2020-10-06 PROBLEM — R09.02 HYPOXIA: Status: ACTIVE | Noted: 2020-10-06

## 2020-10-06 LAB
ABO/RH: NORMAL
ANION GAP SERPL CALCULATED.3IONS-SCNC: 13 MMOL/L (ref 3–16)
ANTIBODY SCREEN: NORMAL
APTT: 29.5 SEC (ref 24.2–36.2)
BASOPHILS ABSOLUTE: 0 K/UL (ref 0–0.2)
BASOPHILS RELATIVE PERCENT: 0.3 %
BUN BLDV-MCNC: 11 MG/DL (ref 7–20)
C-REACTIVE PROTEIN: 71.1 MG/L (ref 0–5.1)
CALCIUM SERPL-MCNC: 8.1 MG/DL (ref 8.3–10.6)
CHLORIDE BLD-SCNC: 103 MMOL/L (ref 99–110)
CO2: 23 MMOL/L (ref 21–32)
CREAT SERPL-MCNC: 0.9 MG/DL (ref 0.9–1.3)
D DIMER: 278 NG/ML DDU (ref 0–229)
EKG ATRIAL RATE: 83 BPM
EKG DIAGNOSIS: NORMAL
EKG P AXIS: 50 DEGREES
EKG P-R INTERVAL: 156 MS
EKG Q-T INTERVAL: 372 MS
EKG QRS DURATION: 86 MS
EKG QTC CALCULATION (BAZETT): 437 MS
EKG R AXIS: 29 DEGREES
EKG T AXIS: 24 DEGREES
EKG VENTRICULAR RATE: 83 BPM
EOSINOPHILS ABSOLUTE: 0 K/UL (ref 0–0.6)
EOSINOPHILS RELATIVE PERCENT: 0 %
FERRITIN: 1040 NG/ML (ref 30–400)
FIBRINOGEN: 503 MG/DL (ref 200–397)
GFR AFRICAN AMERICAN: >60
GFR NON-AFRICAN AMERICAN: >60
GLUCOSE BLD-MCNC: 192 MG/DL (ref 70–99)
HCT VFR BLD CALC: 39.7 % (ref 40.5–52.5)
HEMOGLOBIN: 13.1 G/DL (ref 13.5–17.5)
INR BLD: 1.14 (ref 0.86–1.14)
LACTATE DEHYDROGENASE: 369 U/L (ref 100–190)
LYMPHOCYTES ABSOLUTE: 0.3 K/UL (ref 1–5.1)
LYMPHOCYTES RELATIVE PERCENT: 8.9 %
MCH RBC QN AUTO: 28.8 PG (ref 26–34)
MCHC RBC AUTO-ENTMCNC: 33 G/DL (ref 31–36)
MCV RBC AUTO: 87.2 FL (ref 80–100)
MONOCYTES ABSOLUTE: 0.1 K/UL (ref 0–1.3)
MONOCYTES RELATIVE PERCENT: 2.2 %
NEUTROPHILS ABSOLUTE: 3.5 K/UL (ref 1.7–7.7)
NEUTROPHILS RELATIVE PERCENT: 88.6 %
PDW BLD-RTO: 14.7 % (ref 12.4–15.4)
PLATELET # BLD: 243 K/UL (ref 135–450)
PMV BLD AUTO: 7.6 FL (ref 5–10.5)
POTASSIUM REFLEX MAGNESIUM: 3.6 MMOL/L (ref 3.5–5.1)
PROCALCITONIN: 0.14 NG/ML (ref 0–0.15)
PROTHROMBIN TIME: 13.2 SEC (ref 10–13.2)
RBC # BLD: 4.55 M/UL (ref 4.2–5.9)
SODIUM BLD-SCNC: 139 MMOL/L (ref 136–145)
WBC # BLD: 3.9 K/UL (ref 4–11)

## 2020-10-06 PROCEDURE — 86850 RBC ANTIBODY SCREEN: CPT

## 2020-10-06 PROCEDURE — 2700000000 HC OXYGEN THERAPY PER DAY

## 2020-10-06 PROCEDURE — 6360000002 HC RX W HCPCS: Performed by: INTERNAL MEDICINE

## 2020-10-06 PROCEDURE — 93010 ELECTROCARDIOGRAM REPORT: CPT | Performed by: INTERNAL MEDICINE

## 2020-10-06 PROCEDURE — 2580000003 HC RX 258: Performed by: INTERNAL MEDICINE

## 2020-10-06 PROCEDURE — 1200000000 HC SEMI PRIVATE

## 2020-10-06 PROCEDURE — 85025 COMPLETE CBC W/AUTO DIFF WBC: CPT

## 2020-10-06 PROCEDURE — 94660 CPAP INITIATION&MGMT: CPT

## 2020-10-06 PROCEDURE — 6370000000 HC RX 637 (ALT 250 FOR IP): Performed by: INTERNAL MEDICINE

## 2020-10-06 PROCEDURE — 86901 BLOOD TYPING SEROLOGIC RH(D): CPT

## 2020-10-06 PROCEDURE — 86900 BLOOD TYPING SEROLOGIC ABO: CPT

## 2020-10-06 PROCEDURE — 94761 N-INVAS EAR/PLS OXIMETRY MLT: CPT

## 2020-10-06 PROCEDURE — 80048 BASIC METABOLIC PNL TOTAL CA: CPT

## 2020-10-06 PROCEDURE — 87449 NOS EACH ORGANISM AG IA: CPT

## 2020-10-06 RX ORDER — ONDANSETRON 2 MG/ML
4 INJECTION INTRAMUSCULAR; INTRAVENOUS EVERY 6 HOURS PRN
Status: DISCONTINUED | OUTPATIENT
Start: 2020-10-06 | End: 2020-10-13 | Stop reason: HOSPADM

## 2020-10-06 RX ORDER — POLYETHYLENE GLYCOL 3350 17 G/17G
17 POWDER, FOR SOLUTION ORAL DAILY PRN
Status: DISCONTINUED | OUTPATIENT
Start: 2020-10-06 | End: 2020-10-13 | Stop reason: HOSPADM

## 2020-10-06 RX ORDER — PROMETHAZINE HYDROCHLORIDE 25 MG/1
12.5 TABLET ORAL EVERY 6 HOURS PRN
Status: DISCONTINUED | OUTPATIENT
Start: 2020-10-06 | End: 2020-10-13 | Stop reason: HOSPADM

## 2020-10-06 RX ORDER — SODIUM CHLORIDE 0.9 % (FLUSH) 0.9 %
10 SYRINGE (ML) INJECTION PRN
Status: DISCONTINUED | OUTPATIENT
Start: 2020-10-06 | End: 2020-10-13 | Stop reason: HOSPADM

## 2020-10-06 RX ORDER — ACETAMINOPHEN 650 MG/1
650 SUPPOSITORY RECTAL EVERY 6 HOURS PRN
Status: DISCONTINUED | OUTPATIENT
Start: 2020-10-06 | End: 2020-10-06

## 2020-10-06 RX ORDER — ACETAMINOPHEN 325 MG/1
650 TABLET ORAL EVERY 6 HOURS PRN
Status: DISCONTINUED | OUTPATIENT
Start: 2020-10-06 | End: 2020-10-06

## 2020-10-06 RX ORDER — SODIUM CHLORIDE 0.9 % (FLUSH) 0.9 %
10 SYRINGE (ML) INJECTION EVERY 12 HOURS SCHEDULED
Status: DISCONTINUED | OUTPATIENT
Start: 2020-10-06 | End: 2020-10-13 | Stop reason: HOSPADM

## 2020-10-06 RX ADMIN — ENOXAPARIN SODIUM 30 MG: 30 INJECTION SUBCUTANEOUS at 10:01

## 2020-10-06 RX ADMIN — ENOXAPARIN SODIUM 30 MG: 30 INJECTION SUBCUTANEOUS at 02:53

## 2020-10-06 RX ADMIN — ACETAMINOPHEN 650 MG: 325 TABLET ORAL at 18:30

## 2020-10-06 RX ADMIN — DEXAMETHASONE 6 MG: 4 TABLET ORAL at 02:52

## 2020-10-06 RX ADMIN — SODIUM CHLORIDE, PRESERVATIVE FREE 10 ML: 5 INJECTION INTRAVENOUS at 20:11

## 2020-10-06 RX ADMIN — ENOXAPARIN SODIUM 30 MG: 30 INJECTION SUBCUTANEOUS at 20:09

## 2020-10-06 RX ADMIN — SODIUM CHLORIDE, PRESERVATIVE FREE 10 ML: 5 INJECTION INTRAVENOUS at 10:02

## 2020-10-06 ASSESSMENT — PAIN SCALES - GENERAL
PAINLEVEL_OUTOF10: 7
PAINLEVEL_OUTOF10: 0

## 2020-10-06 NOTE — ED PROVIDER NOTES
I independently performed a history and physical on Jose Enrique Henry. All diagnostic, treatment, and disposition decisions were made by myself in conjunction with the advanced practice provider. Briefly, this is a 53 y.o. male here for shortness of breath which started today. Patient also states that he has had a mild cough and he has had a mild fever for which he is taking Motrin. No body aches, no headache, no loss of taste or smell. No sore throat. No nausea or vomiting. No known exposure to COVID-19. On exam, ill-appearing adult male who is tachypneic. The patient is on 2 L nasal cannula and is not usually on oxygen at home. He is not a smoker. He is 95% on 2 L but is tachypneic and easily dyspneic with movement and speaking. Unable to speak in full sentences. He has scattered rhonchi mostly on the left. Patient is tachycardic but regular. He feels warmer than the initially recorded temperature. The nurse did recheck his temperature and he is 100 °F orally. EKG  The Ekg interpreted by me in the absence of a cardiologist shows:  Normal sinus rhythm at a rate of 83 bpm, OR interval QRS and QTc normal.  The patient has deep Q waves in lead III and S wave in lead I. Patient also has T wave inversions in leads V4 through V6. Also in aVF. No previous for comparison. Screenings   Isaiah Coma Scale  Eye Opening: Spontaneous  Best Verbal Response: Oriented  Best Motor Response: Obeys commands  [place]pearl Coma Scale Score: 15        Critical Time  None       MDM  Ill-appearing adult male who comes in for shortness of breath he was found to be hypoxic here laboratory studies chest x-ray and EKG ordered. Patient is given breathing treatments and placed on supplemental oxygen. Patient is still dyspneic and tachypneic even on oxygen. He is tachycardic and feels warm to touch. The patient is given acetaminophen. COVID-19 test is added on.   Also a CT PE given his tachycardia and EKG

## 2020-10-06 NOTE — ACP (ADVANCE CARE PLANNING)
Advance Care Planning     Advance Care Planning Clinical Specialist  Conversation Note      Date of ACP Conversation: 10/5/2020    Conversation Conducted with: Patient with Dhruv 52: Next of Kin by law (only applies in absence of above) (name)  spouse, April [place]    ACP Clinical Specialist: [id number] Decision Maker:     Current Designated Health Care Decision Maker:   Primary Decision Maker: [place]aviota Blood Spouse - [phone]    Secondary Decision Maker: Chuy Piña - Brother/Sister - [phone]  Validated  this information as still accurate & up-to-date    Care Preferences    Hospitalization: \"If your health worsens and it becomes clear that your chance of recovery is unlikely, what would your preference be regarding hospitalization? \"    Choice:  [x] The patient wants hospitalization  [] The patient prefers comfort-focused treatment without hospitalization. Ventilation: \"If you were in your present state of health and suddenly became very ill and were unable to breathe on your own, what would your preference be about the use of a ventilator (breathing machine) if it were available to you? \"      Would the patient desire the use of ventilator (breathing machine)?: yes    \"If your health worsens and it becomes clear that your chance of recovery is unlikely, what would your preference be about the use of a ventilator (breathing machine) if it were available to you? \"     Would the patient desire the use of ventilator (breathing machine)?: No      Resuscitation  \"CPR works best to restart the heart when there is a sudden event, like a heart attack, in someone who is otherwise healthy. Unfortunately, CPR does not typically restart the heart for people who have serious health conditions or who are very sick. \"    \"In the event your heart stopped as a result of an underlying serious health condition, would you want attempts to be made to restart your heart (answer \"yes\" for attempt to resuscitate) or would you prefer a natural death (answer \"no\" for do not attempt to resuscitate)? \" yes     [x] Yes   [] No   Educated Patient / Julissa Nagelliang regarding differences between Advance Directives and portable DNR orders. Length of ACP Conversation in minutes:  5    Conversation Outcomes:  [x] ACP discussion completed  [] Existing advance directive reviewed with patient; no changes to patient's previously recorded wishes  [] New Advance Directive completed  [] Portable Do Not Rescitate prepared for Provider review and signature  [] POLST/POST/MOLST/MOST prepared for Provider review and signature      Follow-up plan: Pt offered c/s to Spiritual care team for completion of Living Will and/or Power of [employer]. Pt declined at this time.      Elena Mallory RN

## 2020-10-06 NOTE — CARE COORDINATION
CASE MANAGEMENT INITIAL ASSESSMENT      Reviewed chart and completed assessment via telephone with: Patient via hospital room phone    Explained Case Management role/services. yes    Primary contact information: spouse, April [address]:  Who do you trust or have selected to make healthcare decisions for you? Name:   spouse, Lori Jonas  Phone  Number:  [phone]    Can this person be reached and be able to respond quickly, such as within a few minutes or hours? Yes  Who would be your back-up decision maker? Name: brotherWaldemar  Phone Number: [phone]    Admit date/status: inpatient 10/06/20   Diagnosis: pneumonia   Is this a Readmission?:  No      Insurance: [address] required for SNF: Yes       3 night stay required: No    Living arrangements, Adls, care needs, prior to admission: lives with spouse, Zenon Rico, drives    Transportation: likely family/private     Durable Medical Equipment at home: none    Services in the home and/or outpatient, prior to admission: none    PT/OT recs: [address] [facility] [address] Notification (HEN): n/a    Barriers to discharge: none identified     Plan/comments: pt intends to discharge home without needs from [address] [place] [address] team. Please consult CM team if needs arise. Of note: pt is on oxygen via n/c at this time. Will follow for potential o2 needs.      Kimberli Valentin RN CM

## 2020-10-06 NOTE — ED NOTES
Patient O2 was 88% on room air, sitting in bed after breathing treatment. Patient was placed on 2L nasal canula.       Brittaney Yadav RN  10/05/20 1516

## 2020-10-06 NOTE — ED PROVIDER NOTES
[facility] Emergency Department    CHIEF COMPLAINT  Shortness of Breath (SOB starting today. with cough, denies N/V or diarrhea ) and Cough      SHARED SERVICE VISIT  I have seen and evaluated this patient with my supervising physician, Dr. Yunier Orozco. HISTORY OF PRESENT ILLNESS  Asael Varela is a 53 y.o. male who presents to the ED complaining of several hour history of shortness of breath. Patient brought in by wife for evaluation. History of sleep apnea. Denies history of asthma COPD. Non-smoker. Cough and shortness of breath started earlier today. Denies chest pain. No headaches, lightheadedness, dizziness or confusion. Mild nasal congestion without sore throat. Denies fevers chills. No loss of taste or smell. Cough nonproductive. No hemoptysis. No associated abdominal pain. No nausea, vomiting or diarrhea. Denies leg pain or swelling. No recent travel, trauma or surgery. No history of blood clots. Denies pain with deep inspiration. No other complaints, modifying factors or associated symptoms. Nursing notes reviewed.    Past Medical History:   Diagnosis Date    Severe obstructive sleep apnea      Past Surgical History:   Procedure Laterality Date    APPENDECTOMY      COLONOSCOPY N/A 1/20/2020    COLONOSCOPY W/ ANESTHESIA performed by Claudio Fuentes MD at [facility] ARTHROSCOPY Left      Family History   Problem Relation Age of Onset    Heart Disease Father     High Blood Pressure Paternal Aunt     High Blood Pressure Paternal Uncle     Cancer Mother         myeloma    Cancer Maternal Grandmother      Social History     Socioeconomic History    Marital status:      Spouse name: Not on file    Number of children: Not on file    Years of education: Not on file    Highest education level: Not on file   Occupational History    Not on file   Social Needs    Financial resource strain: Not on file    Food insecurity Worry: Not on file     Inability: Not on file    Transportation needs     Medical: Not on file     Non-medical: Not on file   Tobacco Use    Smoking status: Former Smoker     Packs/day: 0.50     Years: 7.00     Pack years: 3.50     Types: Cigars, Cigarettes     Last attempt to quit: 2001     Years since quittin.7    Smokeless tobacco: Never Used    Tobacco comment: light cigar smoker/ 1 every month or so,   Substance and Sexual Activity    Alcohol use:  Yes     Alcohol/week: 1.0 - 2.0 standard drinks     Types: 1 - 2 Shots of liquor per week     Comment: occasional    Drug use: No    Sexual activity: Not on file   Lifestyle    Physical activity     Days per week: Not on file     Minutes per session: Not on file    Stress: Not on file   Relationships    Social connections     Talks on phone: Not on file     Gets together: Not on file     Attends Oriental orthodox service: Not on file     Active member of club or organization: Not on file     Attends meetings of clubs or organizations: Not on file     Relationship status: Not on file    Intimate partner violence     Fear of current or ex partner: Not on file     Emotionally abused: Not on file     Physically abused: Not on file     Forced sexual activity: Not on file   Other Topics Concern    Not on file   Social History Narrative    Not on file     Current Facility-Administered Medications   Medication Dose Route Frequency Provider Last Rate Last Dose    ipratropium-albuterol (DUONEB) nebulizer solution 3 ampule  3 ampule Inhalation Once Michele [place]        methylPREDNISolone sodium (SOLU-MEDROL) injection 125 mg  125 mg Intravenous Once Michele [place], [place]         Current Outpatient Medications   Medication Sig Dispense Refill    Loratadine-Pseudoephedrine (CLARITIN-D 12 HOUR PO) Take by mouth       No Known Allergies    REVIEW OF SYSTEMS  10 systems reviewed, pertinent positives per HPI otherwise noted to be negative    PHYSICAL EXAM  BP (!) 149/90   Pulse 85   Temp 99.4 °F (37.4 °C) (Oral)   Resp 18   Wt (!) 340 lb (154.2 kg)   SpO2 91%   BMI 47.42 kg/m²   GENERAL APPEARANCE: Awake and alert. Cooperative. No acute distress. HEAD: Normocephalic. Atraumatic. EYES: PERRL. EOM's grossly intact. ENT: Mucous membranes are moist.   NECK: Supple. No JVD. No tracheal tenderness or deviation. No crepitus. HEART: RRR. No murmurs. No chest wall tenderness. LUNGS: Respirations unlabored. CTAB. Good air exchange. Speaking comfortably in full sentences. Diffuse expiratory wheezing. No obvious rhonchi or rales. ABDOMEN: Soft. Non-distended. Non-tender. No guarding or rebound. No midline pulsatile mass. EXTREMITIES: No peripheral edema. No unilateral calf pain, redness or swelling. Moves all extremities equally. All extremities neurovascularly intact. SKIN: Warm and dry. No acute rashes. NEUROLOGICAL: Alert and oriented. CN's 2-12 intact. No gross facial drooping. Strength 5/5, sensation intact. PSYCHIATRIC: Normal mood and affect. RADIOLOGY  Xr Chest Portable    Result Date: 10/5/2020  EXAMINATION: ONE XRAY VIEW OF THE CHEST 10/5/2020 8:27 pm COMPARISON: None. HISTORY: ORDERING SYSTEM PROVIDED HISTORY: sob TECHNOLOGIST PROVIDED HISTORY: Reason for exam:->sob Reason for Exam: SOB Acuity: Acute Type of Exam: Initial FINDINGS: The patient is semi lordotic. Lung volumes are low. Cardiac silhouette projects is prominent. Patchy opacities are noted in the lung bases. Limited, low lung volume study with bibasilar atelectasis versus pneumonia. RECOMMENDATION: PA and lateral chest would be helpful as follow-up. Ct Chest Pulmonary Embolism W Contrast    Result Date: 10/5/2020  EXAMINATION: CTA OF THE CHEST 10/5/2020 10:44 pm TECHNIQUE: CTA of the chest was performed after the administration of intravenous contrast.  Multiplanar reformatted images are provided for review. MIP images are provided for review.  Dose modulation, iterative temperature of approximately 100. Given dose of Tylenol. VBG without acidosis. INR 1. 14. Lactic acid was negative. BNP less than 5. Pro-Aung 0.14. Stable portable chest x-ray. CT chest without evidence to suggest pulmonary embolus although did appear to have multifocal pneumonia which may be consistent with viral pneumonia. Given negative pro-Aung as well as lack of white count suspecting COVID infection causing associated symptomology. Discussed care with hospital medicine regarding admission and orders have been placed. A discussion was had with Mr. Delphine Herndon regarding shortness of breath, ED findings and recommendations for admission. Risk management discussed and shared decision making had with patient and/or surrogate. All questions were answered. Patient in agreement. CRITICAL CARE TIME  50 Minutes of critical care time spent not including separately billable procedures.     MDM  Results for orders placed or performed during the hospital encounter of 10/05/20   CBC Auto Differential   Result Value Ref Range    WBC 4.1 4.0 - 11.0 K/uL    RBC 4.76 4.20 - 5.90 M/uL    Hemoglobin 13.9 13.5 - 17.5 g/dL    Hematocrit 41.4 40.5 - 52.5 %    MCV 87.0 80.0 - 100.0 fL    MCH 29.2 26.0 - 34.0 pg    MCHC 33.6 31.0 - 36.0 g/dL    RDW 14.6 12.4 - 15.4 %    Platelets 958 954 - 076 K/uL    MPV 7.9 5.0 - 10.5 fL    Neutrophils % 68.8 %    Lymphocytes % 23.0 %    Monocytes % 7.7 %    Eosinophils % 0.0 %    Basophils % 0.5 %    Neutrophils Absolute 2.8 1.7 - 7.7 K/uL    Lymphocytes Absolute 0.9 (L) 1.0 - 5.1 K/uL    Monocytes Absolute 0.3 0.0 - 1.3 K/uL    Eosinophils Absolute 0.0 0.0 - 0.6 K/uL    Basophils Absolute 0.0 0.0 - 0.2 K/uL   Comprehensive Metabolic Panel   Result Value Ref Range    Sodium 136 136 - 145 mmol/L    Potassium 3.5 3.5 - 5.1 mmol/L    Chloride 100 99 - 110 mmol/L    CO2 26 21 - 32 mmol/L    Anion Gap 10 3 - 16    Glucose 112 (H) 70 - 99 mg/dL    BUN 14 7 - 20 mg/dL    CREATININE 1.0 0.9 - 1.3 mg/dL    GFR Non-African American >60 >60    GFR African American >60 >60    Calcium 8.1 (L) 8.3 - 10.6 mg/dL    Total Protein 7.2 6.4 - 8.2 g/dL    Alb 3.8 3.4 - 5.0 g/dL    Albumin/Globulin Ratio 1.1 1.1 - 2.2    Total Bilirubin 0.4 0.0 - 1.0 mg/dL    Alkaline Phosphatase 68 40 - 129 U/L    ALT 19 10 - 40 U/L    AST 23 15 - 37 U/L    Globulin 3.4 g/dL   Troponin   Result Value Ref Range    Troponin <0.01 <0.01 ng/mL   Lactic Acid, Plasma   Result Value Ref Range    Lactic Acid 0.9 0.4 - 2.0 mmol/L   Blood Gas, Venous   Result Value Ref Range    pH, Akbar 7.351 7.350 - 7.450    pCO2, Akbar 47.7 40.0 - 50.0 mmHg    pO2, Akbar 43.9 (H) 25.0 - 40.0 mmHg    HCO3, Venous 25.8 23.0 - 29.0 mmol/L    Base Excess, Akbar -0.3 -3.0 - 3.0 mmol/L    O2 Sat, Akbar 78 Not Established %    Carboxyhemoglobin 2.0 (H) 0.0 - 1.5 %    MetHgb, Akbar 0.6 <1.5 %    TC02 (Calc), Akbar 27 Not Established mmol/L    O2 Content, Akbar 16 Not Established VOL %    O2 Therapy Unknown    Brain Natriuretic Peptide   Result Value Ref Range    Pro-BNP <5 0 - 124 pg/mL   Procalcitonin   Result Value Ref Range    Procalcitonin 0.14 0.00 - 0.15 ng/mL   Protime-INR   Result Value Ref Range    Protime 13.2 10.0 - 13.2 sec    INR 1.14 0.86 - 1.14   APTT   Result Value Ref Range    aPTT 29.5 24.2 - 36.2 sec   Fibrinogen   Result Value Ref Range    Fibrinogen 503 (H) 200 - 397 mg/dL   D-Dimer, Quantitative   Result Value Ref Range    D-Dimer, Quant 278 (H) 0 - 229 ng/mL DDU   Lactate Dehydrogenase   Result Value Ref Range     (H) 100 - 190 U/L   EKG 12 Lead   Result Value Ref Range    Ventricular Rate 83 BPM    Atrial Rate 83 BPM    P-R Interval 156 ms    QRS Duration 86 ms    Q-T Interval 372 ms    QTc Calculation (Bazett) 437 ms    P Axis 50 degrees    R Axis 29 degrees    T Axis 24 degrees    Diagnosis       Normal sinus rhythmNonspecific T wave abnormalityAbnormal ECGNo previous ECGs available     I spoke with Dorita Jc and Elmer.  We thoroughly discussed the history, physical exam, laboratory and imaging studies, as well as, emergency department course. Based upon that discussion, we've decided to admit Verna Rivera to the hospital for further observation, evaluation, and treatment. Final Impression  1. Multifocal pneumonia    2. Suspected COVID-19 virus infection    3. Acute respiratory failure with hypoxia (HCC)      Blood pressure (!) 165/92, pulse 89, temperature 98.7 °F (37.1 °C), temperature source Oral, resp. rate (!) 34, height 5' 11\" (1.803 m), weight (!) 324 lb 14.4 oz (147.4 kg), SpO2 96 %. DISPOSITION  Patient was admitted to the hospital in stable condition.           [place], [place]  10/06/20 0051

## 2020-10-06 NOTE — H&P
Hospital Medicine History & Physical      PCP: Daniel Espitia MD    Date of Admission: 10/5/2020    Date of Service: Pt seen/examined on 10/6/2020 and Admitted to Inpatient with expected LOS greater than two midnights due to medical therapy. Chief Complaint: Shortness of breath      History Of Present Illness:    53 y.o. male who presented to [facility] with above complaints  Patient with PMH of RUBI on CPAP, presented to the ER today with complaints of shortness of breath. Patient reports he developed fever a week back 101 °F at home. Also reports 1 week history of dry cough, congestion, poor appetite. Tried some DayQuil/NyQuil with some relief. Also reports associated myalgias and generalized weakness. Patient denied any sore throat, no loss of taste or smell. Patient had video OPV with PCP on 10/1. Was advised to get symptomatic treatment, and if worsens to get COVID-19 test which this patient was still not gotten. Due to worsening symptoms, patient decided to present to the ER today. In the ED patient was ambulated and became hypoxic on room air at 85%. Patient was then placed on 2 L nasal cannula oxygen. X-ray chest showing bilateral groundglass opacities. Past Medical History:          Diagnosis Date    PNA (pneumonia) 10/5/2020    Severe obstructive sleep apnea        Past Surgical History:          Procedure Laterality Date    APPENDECTOMY      COLONOSCOPY N/A 1/20/2020    COLONOSCOPY W/ ANESTHESIA performed by Sonny Mcclelland MD at [address] ARTHROS[address] Left        Medications Prior to Admission:      Prior to Admission medications    Medication Sig Start Date End Date Taking? Authorizing Provider   Loratadine-Pseudoephedrine (CLARITIN-D 12 HOUR PO) Take by mouth    Historical Provider, MD       Allergies:  Patient has no known allergies.     Social History:      The patient currently lives at home    TOBACCO:   reports that he quit smoking about 23 years ago. His smoking use included cigars and cigarettes. He has a 3.50 pack-year smoking history. He has never used smokeless tobacco.  ETOH:   reports current alcohol use of about 1.0 - 2.0 standard drinks of alcohol per week. E-Cigarettes Vaping or Juuling     Questions Responses    Vaping Use Never User    Start Date     Does device contain nicotine? Quit Date     Vaping Type             Family History:     Positive as follows:        Problem Relation Age of Onset    Heart Disease Father     High Blood Pressure Paternal Aunt     High Blood Pressure Paternal Uncle     Cancer Mother         myeloma    Cancer Maternal Grandmother        REVIEW OF SYSTEMS:   Pertinent positives as noted in the HPI. All other systems reviewed and negative. PHYSICAL EXAM PERFORMED:    BP (!) 170/93   Pulse 98   Temp 98.7 °F (37.1 °C) (Oral)   Resp (!) 34   Wt (!) 340 lb (154.2 kg)   SpO2 94%   BMI 47.42 kg/m²     General appearance:  No apparent distress, appears stated age and cooperative. HEENT:  Normal cephalic, atraumatic without obvious deformity. Pupils equal, round, and reactive to light. Extra ocular muscles intact. Conjunctivae/corneas clear. Neck: Supple, with full range of motion. No jugular venous distention. Trachea midline. Respiratory: Tachypneic, increased respiratory effort. Bilateral rhonchi to auscultation   cardiovascular:  Regular rate and rhythm with normal S1/S2 without murmurs, rubs or gallops. Abdomen: Soft, non-tender, non-distended with normal bowel sounds. Musculoskeletal:  No clubbing, cyanosis or edema bilaterally. Full range of motion without deformity. Skin: Skin color, texture, turgor normal.  No rashes or lesions. Neurologic:  Neurovascularly intact without any focal sensory/motor deficits.  Cranial nerves: II-XII intact, grossly non-focal.  Psychiatric:  Alert and oriented, thought content appropriate, normal insight  Capillary Refill: Brisk,< 3 seconds   Peripheral Pulses: +2 palpable, equal bilaterally       Labs:     Recent Labs     10/05/20  1945   WBC 4.1   HGB 13.9   HCT 41.4        Recent Labs     10/05/20  1945      K 3.5      CO2 26   BUN 14   CREATININE 1.0   CALCIUM 8.1*     Recent Labs     10/05/20  1945   AST 23   ALT 19   BILITOT 0.4   ALKPHOS 68     Recent Labs     10/05/20  1949   INR 1.14     Recent Labs     10/05/20  1945   TROPONINI <0.01       Urinalysis:      Lab Results   Component Value Date    BLOODU neg 07/05/2016    SPECGRAV 1.010 07/05/2016    GLUCOSEU neg 07/05/2016       Radiology:     I personally reviewed the chest x-ray and CT chest, and also reviewed the radiologist interpretation    EKG:  I have reviewed the EKG with the following interpretation: NSR, rate 83, nonspecific T wave abnormality, no acute ischemic changes, no prior EKGs to compare    CT CHEST PULMONARY EMBOLISM W CONTRAST   Final Result   No gross evidence of an acute pulmonary embolus on this motion degraded study. Multifocal bilateral ground-glass opacities involving all lobes of both lungs   with basilar consolidation and mild volume loss. Multifocal pneumonia with   basilar atelectasis suspected, possibly an atypical or viral pneumonia. Imaging features can be seen with COVID-19 pneumonia, though are nonspecific   and can occur with a variety of infectious and noninfectious processes. PneInd         XR CHEST PORTABLE   Final Result   Limited, low lung volume study with bibasilar atelectasis versus pneumonia. RECOMMENDATION:   PA and lateral chest would be helpful as follow-up. ASSESSMENT:PLAN:    Pneumonia w/ acute hypoxic respiratory failure, suspect COVID-19  Presenting with fever, shortness of breath, myalgia, URI-like symptoms x1 week  Temp 100 °F in the ED, 101 °F at home.   CT chest showing multifocal bilateral GGO  Labs showing elevated LDH, mild lymphopenia, elevated fibrinogen 503 and d-dimer 278  -Procalcitonin and WBC count normal, hold off on IV antibiotics  -Requiring supplemental oxygen, currently on 2 L/min to maintain sats >94%, wean as tolerated  -Start Decadron 6 mg daily p.o. since patient requiring oxygen  -COVID-19 PCR pending  -Other COVID-19 labs for prognostication pending  -Droplet plus isolation  -Sputum culture, Gram stain, urine antigens ordered and pending    Obstructive sleep apnea  -Resume CPAP at night    Morbid obesity  BMI 25.55  Complicating assessment and treatment, placing patient at high risk for multiple co-morbidities as well as early death and contributing to the patient's presentation. Counseled on weight loss. DVT Prophylaxis: Lovenox  Diet: Regular diet   code Status: Full code  PT/OT Eval Status: Ambulatory and held    [address] stay       Maegan Solorzano MD    Thank you Santy Velásquez MD for the opportunity to be involved in this patient's care. If you have any questions or concerns please feel free to contact me at [phone].

## 2020-10-06 NOTE — ED NOTES
Jair cutler @ 1502   Re: resp.  Failure  Dr. Gracy Carranza @ [address] [facility], [place]  10/05/20 2364

## 2020-10-07 LAB
ALBUMIN SERPL-MCNC: 3.6 G/DL (ref 3.4–5)
ANION GAP SERPL CALCULATED.3IONS-SCNC: 10 MMOL/L (ref 3–16)
BUN BLDV-MCNC: 13 MG/DL (ref 7–20)
CALCIUM SERPL-MCNC: 8.3 MG/DL (ref 8.3–10.6)
CHLORIDE BLD-SCNC: 104 MMOL/L (ref 99–110)
CO2: 26 MMOL/L (ref 21–32)
CREAT SERPL-MCNC: 1 MG/DL (ref 0.9–1.3)
GFR AFRICAN AMERICAN: >60
GFR NON-AFRICAN AMERICAN: >60
GLUCOSE BLD-MCNC: 132 MG/DL (ref 70–99)
HCT VFR BLD CALC: 40.1 % (ref 40.5–52.5)
HEMOGLOBIN: 13.6 G/DL (ref 13.5–17.5)
L. PNEUMOPHILA SEROGP 1 UR AG: NORMAL
MCH RBC QN AUTO: 29.4 PG (ref 26–34)
MCHC RBC AUTO-ENTMCNC: 33.9 G/DL (ref 31–36)
MCV RBC AUTO: 86.6 FL (ref 80–100)
PDW BLD-RTO: 14.8 % (ref 12.4–15.4)
PHOSPHORUS: 2.5 MG/DL (ref 2.5–4.9)
PLATELET # BLD: 254 K/UL (ref 135–450)
PMV BLD AUTO: 7.6 FL (ref 5–10.5)
POTASSIUM SERPL-SCNC: 3.6 MMOL/L (ref 3.5–5.1)
RBC # BLD: 4.63 M/UL (ref 4.2–5.9)
SARS-COV-2, NAA: DETECTED
SODIUM BLD-SCNC: 140 MMOL/L (ref 136–145)
STREP PNEUMONIAE ANTIGEN, URINE: NORMAL
WBC # BLD: 7.3 K/UL (ref 4–11)

## 2020-10-07 PROCEDURE — 6360000002 HC RX W HCPCS: Performed by: INTERNAL MEDICINE

## 2020-10-07 PROCEDURE — 85027 COMPLETE CBC AUTOMATED: CPT

## 2020-10-07 PROCEDURE — 2580000003 HC RX 258: Performed by: INTERNAL MEDICINE

## 2020-10-07 PROCEDURE — 2500000003 HC RX 250 WO HCPCS: Performed by: NURSE PRACTITIONER

## 2020-10-07 PROCEDURE — 94761 N-INVAS EAR/PLS OXIMETRY MLT: CPT

## 2020-10-07 PROCEDURE — 6370000000 HC RX 637 (ALT 250 FOR IP): Performed by: INTERNAL MEDICINE

## 2020-10-07 PROCEDURE — 36415 COLL VENOUS BLD VENIPUNCTURE: CPT

## 2020-10-07 PROCEDURE — 80069 RENAL FUNCTION PANEL: CPT

## 2020-10-07 PROCEDURE — 2700000000 HC OXYGEN THERAPY PER DAY

## 2020-10-07 PROCEDURE — 1200000000 HC SEMI PRIVATE

## 2020-10-07 RX ORDER — METOPROLOL TARTRATE 5 MG/5ML
5 INJECTION INTRAVENOUS ONCE
Status: COMPLETED | OUTPATIENT
Start: 2020-10-07 | End: 2020-10-07

## 2020-10-07 RX ADMIN — ACETAMINOPHEN 650 MG: 325 TABLET ORAL at 05:07

## 2020-10-07 RX ADMIN — ENOXAPARIN SODIUM 30 MG: 30 INJECTION SUBCUTANEOUS at 10:43

## 2020-10-07 RX ADMIN — SODIUM CHLORIDE, PRESERVATIVE FREE 10 ML: 5 INJECTION INTRAVENOUS at 21:22

## 2020-10-07 RX ADMIN — SODIUM CHLORIDE, PRESERVATIVE FREE 10 ML: 5 INJECTION INTRAVENOUS at 12:08

## 2020-10-07 RX ADMIN — ENOXAPARIN SODIUM 30 MG: 30 INJECTION SUBCUTANEOUS at 21:21

## 2020-10-07 RX ADMIN — METOPROLOL TARTRATE 5 MG: 1 INJECTION, SOLUTION INTRAVENOUS at 22:13

## 2020-10-07 RX ADMIN — DEXAMETHASONE 6 MG: 4 TABLET ORAL at 10:43

## 2020-10-07 RX ADMIN — ACETAMINOPHEN 650 MG: 325 TABLET ORAL at 12:08

## 2020-10-07 ASSESSMENT — PAIN SCALES - GENERAL
PAINLEVEL_OUTOF10: 0
PAINLEVEL_OUTOF10: 0
PAINLEVEL_OUTOF10: 7
PAINLEVEL_OUTOF10: 0

## 2020-10-07 NOTE — PROGRESS NOTES
Hospitalist Progress Note      PCP: Kriss Li MD    Date of Admission: 10/5/2020    Chief Complaint: SOB    Subjective: no new c/o. Medications:  Reviewed    Infusion Medications   Scheduled Medications    sodium chloride flush  10 mL Intravenous 2 times per day    enoxaparin  30 mg Subcutaneous BID    dexamethasone  6 mg Oral Daily     PRN Meds: sodium chloride flush, polyethylene glycol, promethazine **OR** ondansetron, acetaminophen **OR** acetaminophen      Intake/Output Summary (Last 24 hours) at 10/7/2020 1245  Last data filed at 10/7/2020 1201  Gross per 24 hour   Intake 1500 ml   Output 1000 ml   Net 500 ml       Physical Exam NOT Performed:    BP (!) 157/94   Pulse 97   Temp 102.5 °F (39.2 °C) (Oral)   Resp 19   Ht 5' 11\" (1.803 m)   Wt (!) 324 lb 14.4 oz (147.4 kg)   SpO2 94%   BMI 45.31 kg/m²         Labs:   Recent Labs     10/05/20  1945 10/06/20  0411 10/07/20  0550   WBC 4.1 3.9* 7.3   HGB 13.9 13.1* 13.6   HCT 41.4 39.7* 40.1*    243 254     Recent Labs     10/05/20  1945 10/06/20  0411 10/07/20  0551    139 140   K 3.5 3.6 3.6    103 104   CO2 26 23 26   BUN 14 11 13   CREATININE 1.0 0.9 1.0   CALCIUM 8.1* 8.1* 8.3   PHOS  --   --  2.5     Recent Labs     10/05/20  1945   AST 23   ALT 19   BILITOT 0.4   ALKPHOS 68     Recent Labs     10/05/20  1949   INR 1.14     Recent Labs     10/05/20  1945   Anise Roch <0.01       Urinalysis:      Lab Results   Component Value Date    BLOODU neg 07/05/2016    SPECGRAV 1.010 07/05/2016    GLUCOSEU neg 07/05/2016       Consults:    IP CONSULT TO HOSPITALIST      Assessment/Plan:    Active Hospital Problems    Diagnosis    Hypoxia [R09.02]    PNA (pneumonia) [J18.9]    Obstructive sleep apnea syndrome [G47.33]    Morbid obesity due to excess calories ([facility] [address] [E66.01]         PNA - possibly COVID-19. NOT started on empiric ABX. CT chest showing multifocal bilateral GGO.   Labs showing elevated LDH, mild lymphopenia, elevated fibrinogen 503 and d-dimer 278  -Procalcitonin and WBC count normal, hold off on IV antibiotics  -Started Decadron 6 mg daily p.o. since patient requiring oxygen  -COVID-19 PCR pending but discussed w/ wife April extensively via telecon 7 October and both she and  tested positive from outside lab w/ onset of sxs Monday 28 Sept.  -Other COVID-19 labs for prognostication pending  -Droplet plus isolation  -Sputum culture, Gram stain, urine antigens ordered and pending     Acute Respiratory Failure - w/ hypoxia, POArrival, 2nd to above. Supplemental O2 and wean as tolerated. Morbid Obesity -  With Body mass index is 45.31 kg/m². Complicating assessment and treatment. Placing patient at risk for multiple co-morbidities as well as early death and contributing to the patient's presentation. Counseled on weight loss. RUBI - likely 2nd to obesity. Controlled on home CPAP/BiPap - continued, w/ outpatient f/u as previously arranged.         DVT Prophylaxis: LMWH  Diet: DIET GENERAL;  Code Status: Full Code      PT/OT Eval Status: NOT yet ordered. Dispo - pending clinical course and COVID testing.      Fred Davies MD

## 2020-10-07 NOTE — PROGRESS NOTES
10/06/20 2324   NIV Type   $NIV $Daily Charge   Skin Assessment Clean, dry, & intact   NIV Started/Stopped On   Equipment Type v60   Mode CPAP   Mask Type Nasal mask   Mask Size Large   Settings/Measurements   IPAP 5 cmH20   Resp 22   FiO2  60 %   Vt Exhaled 325 mL   Minute Volume 11.5 Liters   Mask Leak (lpm) 11 lpm   Comfort Level Good   Using Accessory Muscles No   SpO2 98

## 2020-10-07 NOTE — PLAN OF CARE
Pt remained free of falls. Call light within reach. A/ox4, calls out appropriately. Non skid footwear in place. Bed locked and in lowest position. Will continue to monitor.

## 2020-10-08 LAB
ALBUMIN SERPL-MCNC: 3.8 G/DL (ref 3.4–5)
ANION GAP SERPL CALCULATED.3IONS-SCNC: 10 MMOL/L (ref 3–16)
BUN BLDV-MCNC: 13 MG/DL (ref 7–20)
CALCIUM SERPL-MCNC: 8.6 MG/DL (ref 8.3–10.6)
CHLORIDE BLD-SCNC: 102 MMOL/L (ref 99–110)
CO2: 28 MMOL/L (ref 21–32)
CREAT SERPL-MCNC: 0.9 MG/DL (ref 0.9–1.3)
GFR AFRICAN AMERICAN: >60
GFR NON-AFRICAN AMERICAN: >60
GLUCOSE BLD-MCNC: 107 MG/DL (ref 70–99)
HCT VFR BLD CALC: 41.2 % (ref 40.5–52.5)
HEMOGLOBIN: 14 G/DL (ref 13.5–17.5)
MCH RBC QN AUTO: 29.2 PG (ref 26–34)
MCHC RBC AUTO-ENTMCNC: 33.9 G/DL (ref 31–36)
MCV RBC AUTO: 86 FL (ref 80–100)
PDW BLD-RTO: 14.7 % (ref 12.4–15.4)
PHOSPHORUS: 2.9 MG/DL (ref 2.5–4.9)
PLATELET # BLD: 264 K/UL (ref 135–450)
PMV BLD AUTO: 7.7 FL (ref 5–10.5)
POTASSIUM SERPL-SCNC: 3.9 MMOL/L (ref 3.5–5.1)
RBC # BLD: 4.79 M/UL (ref 4.2–5.9)
SODIUM BLD-SCNC: 140 MMOL/L (ref 136–145)
WBC # BLD: 6.9 K/UL (ref 4–11)

## 2020-10-08 PROCEDURE — 87205 SMEAR GRAM STAIN: CPT

## 2020-10-08 PROCEDURE — 80069 RENAL FUNCTION PANEL: CPT

## 2020-10-08 PROCEDURE — 36415 COLL VENOUS BLD VENIPUNCTURE: CPT

## 2020-10-08 PROCEDURE — 1200000000 HC SEMI PRIVATE

## 2020-10-08 PROCEDURE — 6360000002 HC RX W HCPCS: Performed by: INTERNAL MEDICINE

## 2020-10-08 PROCEDURE — 99254 IP/OBS CNSLTJ NEW/EST MOD 60: CPT | Performed by: INTERNAL MEDICINE

## 2020-10-08 PROCEDURE — 2580000003 HC RX 258: Performed by: INTERNAL MEDICINE

## 2020-10-08 PROCEDURE — 87070 CULTURE OTHR SPECIMN AEROBIC: CPT

## 2020-10-08 PROCEDURE — 6370000000 HC RX 637 (ALT 250 FOR IP): Performed by: INTERNAL MEDICINE

## 2020-10-08 PROCEDURE — 85027 COMPLETE CBC AUTOMATED: CPT

## 2020-10-08 PROCEDURE — 2700000000 HC OXYGEN THERAPY PER DAY

## 2020-10-08 PROCEDURE — 94761 N-INVAS EAR/PLS OXIMETRY MLT: CPT

## 2020-10-08 RX ORDER — AMLODIPINE BESYLATE 5 MG/1
10 TABLET ORAL DAILY
Status: DISCONTINUED | OUTPATIENT
Start: 2020-10-08 | End: 2020-10-13 | Stop reason: HOSPADM

## 2020-10-08 RX ORDER — HYDRALAZINE HYDROCHLORIDE 20 MG/ML
10 INJECTION INTRAMUSCULAR; INTRAVENOUS EVERY 4 HOURS PRN
Status: DISCONTINUED | OUTPATIENT
Start: 2020-10-08 | End: 2020-10-13 | Stop reason: HOSPADM

## 2020-10-08 RX ADMIN — ENOXAPARIN SODIUM 40 MG: 40 INJECTION SUBCUTANEOUS at 20:55

## 2020-10-08 RX ADMIN — ACETAMINOPHEN 650 MG: 325 TABLET ORAL at 06:08

## 2020-10-08 RX ADMIN — ENOXAPARIN SODIUM 30 MG: 30 INJECTION SUBCUTANEOUS at 09:03

## 2020-10-08 RX ADMIN — SODIUM CHLORIDE, PRESERVATIVE FREE 10 ML: 5 INJECTION INTRAVENOUS at 09:03

## 2020-10-08 RX ADMIN — AMLODIPINE BESYLATE 10 MG: 5 TABLET ORAL at 16:55

## 2020-10-08 RX ADMIN — SODIUM CHLORIDE, PRESERVATIVE FREE 10 ML: 5 INJECTION INTRAVENOUS at 20:55

## 2020-10-08 RX ADMIN — DEXAMETHASONE 6 MG: 4 TABLET ORAL at 09:03

## 2020-10-08 RX ADMIN — HYDRALAZINE HYDROCHLORIDE 10 MG: 20 INJECTION INTRAMUSCULAR; INTRAVENOUS at 13:33

## 2020-10-08 ASSESSMENT — PAIN SCALES - GENERAL
PAINLEVEL_OUTOF10: 7
PAINLEVEL_OUTOF10: 0

## 2020-10-08 NOTE — PROGRESS NOTES
Pt axo. Assessment completed as charted. Pt denies needs at this time. Earlier headache managed by PRN tylenol administered earlier this a.m. Will continue to monitor. Call light in reach.

## 2020-10-08 NOTE — FLOWSHEET NOTE
10/08/20 1438   Provider Notification   Reason for Communication Evaluate  (bp 152/102 1 hr after hydralazine administered.)

## 2020-10-08 NOTE — PROGRESS NOTES
Shift assessment completed per documentation. Pt A&Ox4. VSS. Pt awake in bed. Pt denies pain and discomfort at this time. Bilateral lung sounds diminished. Bed locked and in lowest position. Nonskid slippers on. Side rails up 2/4. Call light and personal belongings within reach. Will continue to monitor.

## 2020-10-08 NOTE — PLAN OF CARE
Problem: Falls - Risk of:  Goal: Will remain free from falls  Description: Will remain free from falls  10/8/2020 0232 by Aristeo Guthrie RN  Outcome: Ongoing    Pt A&Ox4. Pt appropriately calls out for assistance. Nonskid slippers in use. Bed locked and in lowest position. Call light and personal belongings within reach. Pt has remained free from falls this shift. Will continue to monitor.

## 2020-10-08 NOTE — PROGRESS NOTES
39.7* 40.1* 41.2    254 264     Recent Labs     10/06/20  0411 10/07/20  0551 10/08/20  0548    140 140   K 3.6 3.6 3.9    104 102   CO2 23 26 28   BUN 11 13 13   CREATININE 0.9 1.0 0.9   CALCIUM 8.1* 8.3 8.6   PHOS  --  2.5 2.9     Recent Labs     10/05/20  1945   AST 23   ALT 19   BILITOT 0.4   ALKPHOS 68     Recent Labs     10/05/20  1949   INR 1.14     Recent Labs     10/05/20  1945   TROPONINI <0.01       Urinalysis:      Lab Results   Component Value Date    BLOODU neg 07/05/2016    SPECGRAV 1.010 07/05/2016    GLUCOSEU neg 07/05/2016       Consults:    IP CONSULT TO HOSPITALIST      Assessment/Plan:    Active Hospital Problems    Diagnosis    Hypoxia [R09.02]    PNA (pneumonia) [J18.9]    Obstructive sleep apnea syndrome [G47.33]    Morbid obesity due to excess calories ([facility] [address] [E66.01]         PNA - Likely COVID-19. NOT started on empiric ABX. CT chest showing multifocal bilateral GGO. Labs showing elevated LDH, mild lymphopenia, elevated fibrinogen 503 and d-dimer 278  - Procalcitonin and WBC count normal, hold off on IV antibiotics  - Started Decadron 6 mg daily p.o. since patient requiring oxygen  - COVID-19 PCR pending but discussed w/ wife April extensively via telecon 7 October and both she and  tested positive from outside lab w/ onset of sxs Monday 28 Sept. Infectious disease consulted and appreciated in advance. - Other COVID-19 labs for prognostication pending  - Droplet plus isolation  - Sputum culture, Gram stain, urine antigens ordered and pending     Acute Respiratory Failure - w/ hypoxia, POArrival, 2nd to above. Supplemental O2 and wean as tolerated. Morbid Obesity -  With Body mass index is 45.31 kg/m². Complicating assessment and treatment. Placing patient at risk for multiple co-morbidities as well as early death and contributing to the patient's presentation. Counseled on weight loss. RUBI - likely 2nd to obesity.   Controlled on home CPAP/BiPap - continued, w/ outpatient f/u as previously arranged.         DVT Prophylaxis: LMWH - titrated up Thurs 8 October. Diet: DIET GENERAL;  Code Status: Full Code      PT/OT Eval Status: NOT yet ordered. Dispo - pending clinical course and subspecialty recs. Discussed via FaceTime w/ wife while at bedside.      Juwan Shirley MD

## 2020-10-08 NOTE — FLOWSHEET NOTE
10/08/20 0901   Provider Notification   Reason for Communication Evaluate  (bp 156/101, also hypertensive overnight, having headaches)

## 2020-10-09 LAB
ALBUMIN SERPL-MCNC: 3.8 G/DL (ref 3.4–5)
ANION GAP SERPL CALCULATED.3IONS-SCNC: 10 MMOL/L (ref 3–16)
BUN BLDV-MCNC: 13 MG/DL (ref 7–20)
CALCIUM SERPL-MCNC: 8.7 MG/DL (ref 8.3–10.6)
CHLORIDE BLD-SCNC: 102 MMOL/L (ref 99–110)
CO2: 27 MMOL/L (ref 21–32)
CREAT SERPL-MCNC: 0.8 MG/DL (ref 0.9–1.3)
GFR AFRICAN AMERICAN: >60
GFR NON-AFRICAN AMERICAN: >60
GLUCOSE BLD-MCNC: 106 MG/DL (ref 70–99)
HCT VFR BLD CALC: 40.6 % (ref 40.5–52.5)
HEMOGLOBIN: 14 G/DL (ref 13.5–17.5)
MCH RBC QN AUTO: 29.7 PG (ref 26–34)
MCHC RBC AUTO-ENTMCNC: 34.5 G/DL (ref 31–36)
MCV RBC AUTO: 86 FL (ref 80–100)
PDW BLD-RTO: 14.5 % (ref 12.4–15.4)
PHOSPHORUS: 2.8 MG/DL (ref 2.5–4.9)
PLATELET # BLD: 284 K/UL (ref 135–450)
PMV BLD AUTO: 7.7 FL (ref 5–10.5)
POTASSIUM SERPL-SCNC: 4 MMOL/L (ref 3.5–5.1)
RBC # BLD: 4.72 M/UL (ref 4.2–5.9)
SODIUM BLD-SCNC: 139 MMOL/L (ref 136–145)
WBC # BLD: 6.1 K/UL (ref 4–11)

## 2020-10-09 PROCEDURE — 80069 RENAL FUNCTION PANEL: CPT

## 2020-10-09 PROCEDURE — 2580000003 HC RX 258: Performed by: INTERNAL MEDICINE

## 2020-10-09 PROCEDURE — 99232 SBSQ HOSP IP/OBS MODERATE 35: CPT | Performed by: INTERNAL MEDICINE

## 2020-10-09 PROCEDURE — 85027 COMPLETE CBC AUTOMATED: CPT

## 2020-10-09 PROCEDURE — 6370000000 HC RX 637 (ALT 250 FOR IP): Performed by: INTERNAL MEDICINE

## 2020-10-09 PROCEDURE — 36415 COLL VENOUS BLD VENIPUNCTURE: CPT

## 2020-10-09 PROCEDURE — 6360000002 HC RX W HCPCS: Performed by: INTERNAL MEDICINE

## 2020-10-09 PROCEDURE — 2700000000 HC OXYGEN THERAPY PER DAY

## 2020-10-09 PROCEDURE — 1200000000 HC SEMI PRIVATE

## 2020-10-09 PROCEDURE — 2500000003 HC RX 250 WO HCPCS: Performed by: INTERNAL MEDICINE

## 2020-10-09 PROCEDURE — 94761 N-INVAS EAR/PLS OXIMETRY MLT: CPT

## 2020-10-09 RX ORDER — 0.9 % SODIUM CHLORIDE 0.9 %
30 INTRAVENOUS SOLUTION INTRAVENOUS PRN
Status: DISCONTINUED | OUTPATIENT
Start: 2020-10-09 | End: 2020-10-13 | Stop reason: HOSPADM

## 2020-10-09 RX ADMIN — REMDESIVIR 200 MG: 100 INJECTION, POWDER, LYOPHILIZED, FOR SOLUTION INTRAVENOUS at 11:57

## 2020-10-09 RX ADMIN — DEXAMETHASONE 6 MG: 4 TABLET ORAL at 07:50

## 2020-10-09 RX ADMIN — SODIUM CHLORIDE, PRESERVATIVE FREE 10 ML: 5 INJECTION INTRAVENOUS at 20:04

## 2020-10-09 RX ADMIN — ENOXAPARIN SODIUM 40 MG: 40 INJECTION SUBCUTANEOUS at 20:04

## 2020-10-09 RX ADMIN — SODIUM CHLORIDE, PRESERVATIVE FREE 10 ML: 5 INJECTION INTRAVENOUS at 07:50

## 2020-10-09 RX ADMIN — HYDRALAZINE HYDROCHLORIDE 10 MG: 20 INJECTION INTRAMUSCULAR; INTRAVENOUS at 15:00

## 2020-10-09 RX ADMIN — SODIUM CHLORIDE 30 ML: 9 INJECTION, SOLUTION INTRAVENOUS at 11:57

## 2020-10-09 RX ADMIN — AMLODIPINE BESYLATE 10 MG: 5 TABLET ORAL at 07:50

## 2020-10-09 RX ADMIN — ENOXAPARIN SODIUM 40 MG: 40 INJECTION SUBCUTANEOUS at 07:50

## 2020-10-09 ASSESSMENT — PAIN SCALES - GENERAL: PAINLEVEL_OUTOF10: 0

## 2020-10-09 NOTE — CONSULTS
Infectious Diseases   Consult Note      Reason for Consult:  COVID-19 and hypoxemic respiratory failure    Requesting Physician:  Reed Ewing MD     Date of Admission: 10/5/2020  Subjective:   CHIEF COMPLAINT:  SOB       HPI:    Abdoulaye Rondon is a 51yoM with history of obesity, RUBI, HTN    Onset of URI symptoms 9/28/20, progressing with fever, dry cough, weakness, malaise, myalgia. Had +COVID test on 10/3/20. Came to ED 10/6/20 due to worsening SOB and persistent fever. CXR with typical meredith GGO. He was hypoxemic on arrival, currently on 4L NC. Wife also COVID+ convalescing at home. Current abx:  None        Past Surgical History:       Diagnosis Date    PNA (pneumonia) 10/5/2020    Severe obstructive sleep apnea          Procedure Laterality Date    APPENDECTOMY      COLONOSCOPY N/A 1/20/2020    COLONOSCOPY W/ ANESTHESIA performed by Didier Messer MD at [facility] ARTHROSCOPY Left        Social History:    TOBACCO:   reports that he quit smoking about 19 years ago. His smoking use included cigars and cigarettes. He has a 3.50 pack-year smoking history. He has never used smokeless tobacco.  ETOH:   reports current alcohol use of about 1.0 - 2.0 standard drinks of alcohol per week. There is no history of illicit drug use or other significant epidemiologic exposures.       Family History:       Problem Relation Age of Onset    Heart Disease Father     High Blood Pressure Paternal Aunt     High Blood Pressure Paternal Uncle     Cancer Mother         myeloma    Cancer Maternal Grandmother        Current Medications:    Current Facility-Administered Medications: enoxaparin (LOVENOX) injection 40 mg, 40 mg, Subcutaneous, BID  hydrALAZINE (APRESOLINE) injection 10 mg, 10 mg, Intravenous, Q4H PRN  amLODIPine (NORVASC) tablet 10 mg, 10 mg, Oral, Daily  sodium chloride flush 0.9 % injection 10 mL, 10 mL, Intravenous, 2 times per day  sodium chloride flush 0.9 % injection 10 mL, 10 mL, Intravenous, PRN  polyethylene glycol (GLYCOLAX) packet 17 g, 17 g, Oral, Daily PRN  promethazine (PHENERGAN) tablet 12.5 mg, 12.5 mg, Oral, Q6H PRN **OR** ondansetron (ZOFRAN) injection 4 mg, 4 mg, Intravenous, Q6H PRN  acetaminophen (TYLENOL) tablet 650 mg, 650 mg, Oral, Q6H PRN **OR** acetaminophen (TYLENOL) suppository 650 mg, 650 mg, Rectal, Q6H PRN  dexamethasone (DECADRON) tablet 6 mg, 6 mg, Oral, Daily      No Known Allergies       REVIEW OF SYSTEMS:    CONSTITUTIONAL:   Per HPI   EYES:  negative for blurred vision, eye discharge, visual disturbance and icterus  HEENT:  negative for hearing loss, tinnitus, ear drainage, sinus pressure, nasal congestion, epistaxis and snoring  RESPIRATORY:  No hemoptysis  CARDIOVASCULAR:  negative for chest pain, palpitations, exertional chest pressure/discomfort, edema, syncope  GASTROINTESTINAL:  negative for nausea, vomiting, diarrhea, constipation, blood in stool and abdominal pain  GENITOURINARY:  negative for frequency, dysuria, urinary incontinence, decreased urine volume, and hematuria  HEMATOLOGIC/LYMPHATIC:  negative for easy bruising, bleeding and lymphadenopathy  ALLERGIC/IMMUNOLOGIC:  negative for recurrent infections, angioedema, anaphylaxis and drug reactions  ENDOCRINE:  negative for weight changes and diabetic symptoms including polyuria, polydipsia and polyphagia  MUSCULOSKELETAL:  negative for acute joint pain, joint swelling, decreased range of motion and muscle weakness  NEUROLOGICAL:  negative for headaches, slurred speech, unilateral weakness  PSYCHIATRIC/BEHAVIORAL: negative for hallucinations, behavioral problems, confusion and agitation.        Objective:   PHYSICAL EXAM:      VITALS:  /80   Pulse 92   Temp 98.3 °F (36.8 °C) (Oral)   Resp 20   Ht 5' 11\" (1.803 m)   Wt (!) 324 lb 14.4 oz (147.4 kg)   SpO2 90%   BMI 45.31 kg/m²      24HR INTAKE/OUTPUT:      Intake/Output Summary (Last 24 hours) at 10/8/2020 2140  Last data filed at 10/8/2020 1525  Gross per 24 hour   Intake 980 ml   Output 1350 ml   Net -370 ml       Due to the current efforts to prevent transmission of COVID-19 and also the need to preserve PPE for other caregivers, a full physical examination of the patient was not performed. That being said, all relevant records and diagnostic tests were reviewed, including laboratory results and imaging. Please reference any relevant documentation elsewhere. Care will be coordinated with the primary service. CONSTITUTIONAL:  Awake, alert, cooperative, no apparent distress, and appears stated age  [de-identified]: Alpha Aline, EOMI. Sclera white  NECK:  Supple, symmetrical, trachea midline, no adenopathy  LUNGS:  tachypneic  PSYCHIATRIC: Oriented to person place and time. No obvious depression or anxiety. DATA:    Old records have been reviewed    CBC:  Recent Labs     10/06/20  0411 10/07/20  0550 10/08/20  0548   WBC 3.9* 7.3 6.9   RBC 4.55 4.63 4.79   HGB 13.1* 13.6 14.0   HCT 39.7* 40.1* 41.2    254 264   MCV 87.2 86.6 86.0   MCH 28.8 29.4 29.2   MCHC 33.0 33.9 33.9   RDW 14.7 14.8 14.7      BMP:  Recent Labs     10/06/20  0411 10/07/20  0551 10/08/20  0548    140 140   K 3.6 3.6 3.9    104 102   CO2 23 26 28   BUN 11 13 13   CREATININE 0.9 1.0 0.9   CALCIUM 8.1* 8.3 8.6   GLUCOSE 192* 132* 107*           10/5    ddimer  278  Ferritin  1040  CRP   71   procal   0.14      Cultures:   10/3 COVID+  10/5 COVID+  10/6 Legionella ag neg   10/8 Sputum cx NGTD       Radiology Review:  All pertinent images / reports were reviewed as a part of this visit.        Assessment:     Patient Active Problem List   Diagnosis    Chronic migraine without aura without status migrainosus, not intractable    Morbid obesity due to excess calories (HCC)    Essential hypertension    Slow transit constipation    Obstructive sleep apnea syndrome    Corporo-venous occlusive erectile dysfunction    Viral URI    Pure hypercholesterolemia    PNA (pneumonia)    Hypoxia         Rowan Phan is a 12LLR who is evaluated for the following:    SARS-CoV-2 infection  Date of onset of symptoms 9/28/20  Date of diagnosis 10/3/20, confirmed 10/5/20  Admitted 10/5/20 with acute hypoxemic respiratory failure   Clinical status stabilized with decline in fever and reduced oxygen requirement since admission with initiation of steroids   At increased risk of severe disease and respiratory failure due to obesity and HTN   -continue steroid as ordered  -given interval improvement, hold off on any other COVID-specific therapy including antiviral therapy  -supportive care  -isolation as ordered, minimum 10d from onset of symptoms   -trend ddimer, procal, CRP   -DVT ppx     Discussed with patient and wife by phone, all questions answered  D/w RN     We will follow       Celestine Alston M.D. Thank you for the opportunity to participate in the care of your patient.     Please do not hesitate to contact me:   [phone] office  [phone] mobile

## 2020-10-09 NOTE — PROGRESS NOTES
Pt's BP elevated, 160/99. Administered PRN hydralazine per order. Pt's BP now 161/99. Will reassess.

## 2020-10-09 NOTE — PLAN OF CARE
Problem: Gas Exchange - Impaired:  Goal: Levels of oxygenation will improve  Description: Levels of oxygenation will improve  Outcome: Ongoing  Note: Pt on 3L O2 via nasal cannula.

## 2020-10-09 NOTE — ADT AUTH CERT
Pneumonia - Care Day 2 (10/7/2020) by Eleanor Joy RN         Review Status  Review Entered    Completed  10/8/2020 17:26        Criteria Review       Care Day: 2 Care Date: 10/7/2020 Level of Care: Inpatient Floor    Guideline Day 2    Level Of Care    (X) Floor    10/8/2020 5:26 PM EDT by Pham Foy      MS    Clinical Status    (X) * No CO2 retention or acidosis    (X) * No requirement for mechanical ventilation    (X) * Hypotension absent    ( ) * Afebrile or fever improved    10/8/2020 5:26 PM EDT by Pham Foy      Temp max 102.5    ( ) * No hypoxia on room air or oxygenation improved    10/8/2020 5:26 PM EDT by Pham Foy      HF NC @ 4L/min    ( ) * Mental status improved or at baseline    Activity    (X) * Increased activity    10/8/2020 5:26 PM EDT by Pham Foy      up as ross    Routes    (X) Usual diet    10/8/2020 5:26 PM EDT by Pham Foy      General    Interventions    (X) Pulse oximetry    10/8/2020 5:26 PM EDT by Pham Foy      91-95%    (X) Possible oxygen    10/8/2020 5:26 PM EDT by Tyler Travis HF NC @ 4L/min    * Milestone    Additional Notes    10/7

## 2020-10-09 NOTE — PROGRESS NOTES
Hospitalist Progress Note      PCP: David Alcala MD    Date of Admission: 10/5/2020    Chief Complaint: SOB    Subjective: no new c/o. Clinically improving but still w/ O2 requiriment. Medications:  Reviewed    Infusion Medications   Scheduled Medications    enoxaparin  40 mg Subcutaneous BID    amLODIPine  10 mg Oral Daily    sodium chloride flush  10 mL Intravenous 2 times per day    dexamethasone  6 mg Oral Daily     PRN Meds: hydrALAZINE, sodium chloride flush, polyethylene glycol, promethazine **OR** ondansetron, acetaminophen **OR** acetaminophen      Intake/Output Summary (Last 24 hours) at 10/9/2020 0856  Last data filed at 10/9/2020 0547  Gross per 24 hour   Intake 1320 ml   Output 2100 ml   Net -780 ml       Physical Exam Performed:    BP (!) 145/93   Pulse 88   Temp 98.2 °F (36.8 °C) (Oral)   Resp 18   Ht 5' 11\" (1.803 m)   Wt (!) 324 lb 14.4 oz (147.4 kg)   SpO2 91%   BMI 45.31 kg/m²     General appearance: No apparent distress, appears stated age and cooperative. HEENT: Pupils equal, round, and reactive to light. Conjunctivae/corneas clear. Neck: Supple, with full range of motion. No jugular venous distention. Trachea midline. Respiratory:  Normal respiratory effort. Clear to auscultation, bilaterally without Rales/Wheezes/Rhonchi. Cardiovascular: Regular rate and rhythm with normal S1/S2 without murmurs, rubs or gallops. Abdomen: Soft, non-tender, non-distended with normal bowel sounds. Musculoskeletal: No clubbing, cyanosis or edema bilaterally. Full range of motion without deformity. Skin: Skin color, texture, turgor normal.  No rashes or lesions. Neurologic:  Neurovascularly intact without any focal sensory/motor deficits.  Cranial nerves: II-XII intact, grossly non-focal.  Psychiatric: Alert and oriented, thought content appropriate, normal insight  Capillary Refill: Brisk,< 3 seconds   Peripheral Pulses: +2 palpable, equal bilaterally       Labs:   Recent Labs 10/07/20  0550 10/08/20  0548 10/09/20  0531   WBC 7.3 6.9 6.1   HGB 13.6 14.0 14.0   HCT 40.1* 41.2 40.6    264 284     Recent Labs     10/07/20  0551 10/08/20  0548 10/09/20  0531    140 139   K 3.6 3.9 4.0    102 102   CO2 26 28 27   BUN 13 13 13   CREATININE 1.0 0.9 0.8*   CALCIUM 8.3 8.6 8.7   PHOS 2.5 2.9 2.8     No results for input(s): AST, ALT, BILIDIR, BILITOT, ALKPHOS in the last 72 hours. No results for input(s): INR in the last 72 hours. No results for input(s): [place] in the last 72 hours. Urinalysis:      Lab Results   Component Value Date    BLOODU neg 07/05/2016    SPECGRAV 1.010 07/05/2016    GLUCOSEU neg 07/05/2016       Consults:    IP CONSULT TO HOSPITALIST  IP CONSULT TO INFECTIOUS DISEASES      Assessment/Plan:    Active Hospital Problems    Diagnosis    Hypoxia [R09.02]    PNA (pneumonia) [J18.9]    Obstructive sleep apnea syndrome [G47.33]    Morbid obesity due to excess calories ([facility] [address] [E66.01]         PNA - Likely COVID-19. NOT started on empiric ABX. CT chest showing multifocal bilateral GGO. Labs showing elevated LDH, mild lymphopenia, elevated fibrinogen 503 and d-dimer 278  - Procalcitonin and WBC count normal, hold off on IV antibiotics  - Started Decadron 6 mg daily p.o. since patient requiring oxygen  - COVID-19 positive. Discussed w/ wife April extensively via telecon 7 October and both she and  tested positive from outside lab and confirmed w/ onset of sxs Monday 28 Sept. Infectious disease consulted and appreciated now on Steroids, Remdesivir started 9 Sept  - Other COVID-19 labs for prognostication pending  - Droplet plus isolation  - Sputum culture, Gram stain, urine antigens ordered and pending     Acute Respiratory Failure - w/ hypoxia, POArrival, 2nd to above. Supplemental O2 and wean as tolerated. Morbid Obesity -  With Body mass index is 45.31 kg/m². Complicating assessment and treatment.  Placing patient at risk for multiple co-morbidities as well as early death and contributing to the patient's presentation. Counseled on weight loss. RUBI - likely 2nd to obesity. Controlled on home CPAP/BiPap - continued, w/ outpatient f/u as previously arranged.         DVT Prophylaxis: LMWH - titrated up Thurs 8 October. Diet: DIET GENERAL;  Code Status: Full Code      PT/OT Eval Status: NOT yet ordered. Dispo - pending clinical course and subspecialty recs. Discussed via FaceTime w/ wife while at bedside 8 October.      Gurjit Brownlee MD

## 2020-10-09 NOTE — PROGRESS NOTES
Infectious Disease Follow up Notes    CC :  COVID-19      Antibiotics:   None     Decadron 6mg tab daily, started 10/6/20     Admit Date:   10/5/2020  Hospital Day: 5    Subjective:   He remains afebrile  Currently on 3-3.5L NC with SpO2 ~92%  He says he feels tired. Coughing infrequently. Without new concerns otherwise. Objective:     Patient Vitals for the past 8 hrs:   BP Temp Temp src Pulse Resp SpO2   10/09/20 0932 -- -- -- -- -- 91 %   10/09/20 0749 (!) 145/93 98.2 °F (36.8 °C) Oral 88 18 --   10/09/20 0546 (!) 146/95 97.7 °F (36.5 °C) Oral 86 18 91 %   10/09/20 0213 (!) 148/96 98.4 °F (36.9 °C) Oral 82 16 93 %       EXAM:  General:  Morbidly obese.   Alert, oriented, NAD    HEENT:  NCAT, PERRL, sclera anicteric  Non-labored breathing   Tachypneic     EXT:  No focal rash         LINE: IV site ok         Scheduled Meds:   enoxaparin  40 mg Subcutaneous BID    amLODIPine  10 mg Oral Daily    sodium chloride flush  10 mL Intravenous 2 times per day    dexamethasone  6 mg Oral Daily          Data Review:    Lab Results   Component Value Date    WBC 6.1 10/09/2020    HGB 14.0 10/09/2020    HCT 40.6 10/09/2020    MCV 86.0 10/09/2020     10/09/2020     Lab Results   Component Value Date    CREATININE 0.8 (L) 10/09/2020    BUN 13 10/09/2020     10/09/2020    K 4.0 10/09/2020     10/09/2020    CO2 27 10/09/2020       Hepatic Function Panel:   Lab Results   Component Value Date    ALKPHOS 68 10/05/2020    ALT 19 10/05/2020    AST 23 10/05/2020    PROT 7.2 10/05/2020    PROT 6.9 01/27/2012    BILITOT 0.4 10/05/2020    BILIDIR <0.2 06/04/2019    IBILI see below 06/04/2019    LABALBU 3.8 10/09/2020         10/5  ddimer  278  Ferritin  1040  CRP   71      Cultures:   10/3     COVID+  10/5     COVID+  10/6     Legionella ag neg   10/8     Sputum cx NGTD         Radiology Review:  All pertinent images / reports were reviewed as a part of this visit. CXR 10/5/20      Impression    No gross evidence of an acute pulmonary embolus on this motion degraded study.         Multifocal bilateral ground-glass opacities involving all lobes of both lungs    with basilar consolidation and mild volume loss.  Multifocal pneumonia with    basilar atelectasis suspected, possibly an atypical or viral pneumonia. Imaging features can be seen with COVID-19 pneumonia, though are nonspecific    and can occur with a variety of infectious and noninfectious processes. PneInd       Assessment:     Patient Active Problem List    Diagnosis Date Noted    Hypoxia 10/06/2020    PNA (pneumonia) 10/05/2020    Pure hypercholesterolemia 01/10/2020    Viral URI 01/03/2018    Corporo-venous occlusive erectile dysfunction 10/13/2017    Obstructive sleep apnea syndrome 10/13/2016    Slow transit constipation 07/05/2016    Chronic migraine without aura without status migrainosus, not intractable 04/29/2016    Morbid obesity due to excess calories ([facility] [address] 04/29/2016    Essential hypertension 04/29/2016     Overview Note:     No change in meds, no c/o with meds, no chest pain, SOB, palpatations, or syncope. Home bp was good.          SARS-CoV-2 infection with acute hypoxemic respiratory failure   Date of onset of symptoms 9/28/20  Date of diagnosis 10/3/20, confirmed 10/5/20  Admitted 10/5/20 with acute hypoxemic respiratory failure   At increased risk of severe disease and respiratory failure due to obesity and HTN     No appreciable improvement, saturations remain in low 90s  -continue decadron as ordered  -add remdesivir, discussed with patient   -trend procal, ddimer, CRP, ferritin  -supportive care  -DVT ppx      Discussed with patient/family, all questions answered        Mello Frey MD  Phone: [phone]   Fax : [phone]

## 2020-10-10 LAB
A/G RATIO: 1.2 (ref 1.1–2.2)
ALBUMIN SERPL-MCNC: 3.8 G/DL (ref 3.4–5)
ALP BLD-CCNC: 57 U/L (ref 40–129)
ALT SERPL-CCNC: 24 U/L (ref 10–40)
ANION GAP SERPL CALCULATED.3IONS-SCNC: 12 MMOL/L (ref 3–16)
AST SERPL-CCNC: 15 U/L (ref 15–37)
BASOPHILS ABSOLUTE: 0.1 K/UL (ref 0–0.2)
BASOPHILS RELATIVE PERCENT: 0.7 %
BILIRUB SERPL-MCNC: 0.6 MG/DL (ref 0–1)
BUN BLDV-MCNC: 16 MG/DL (ref 7–20)
C-REACTIVE PROTEIN: 32.9 MG/L (ref 0–5.1)
CALCIUM SERPL-MCNC: 8.7 MG/DL (ref 8.3–10.6)
CHLORIDE BLD-SCNC: 102 MMOL/L (ref 99–110)
CO2: 26 MMOL/L (ref 21–32)
CREAT SERPL-MCNC: 0.9 MG/DL (ref 0.9–1.3)
CULTURE, RESPIRATORY: NORMAL
D DIMER: <200 NG/ML DDU (ref 0–229)
EOSINOPHILS ABSOLUTE: 0 K/UL (ref 0–0.6)
EOSINOPHILS RELATIVE PERCENT: 0.4 %
FERRITIN: 1077 NG/ML (ref 30–400)
GFR AFRICAN AMERICAN: >60
GFR NON-AFRICAN AMERICAN: >60
GLOBULIN: 3.3 G/DL
GLUCOSE BLD-MCNC: 97 MG/DL (ref 70–99)
GRAM STAIN RESULT: NORMAL
HCT VFR BLD CALC: 41.5 % (ref 40.5–52.5)
HEMOGLOBIN: 14 G/DL (ref 13.5–17.5)
LYMPHOCYTES ABSOLUTE: 1.7 K/UL (ref 1–5.1)
LYMPHOCYTES RELATIVE PERCENT: 23.2 %
MCH RBC QN AUTO: 29.1 PG (ref 26–34)
MCHC RBC AUTO-ENTMCNC: 33.7 G/DL (ref 31–36)
MCV RBC AUTO: 86.5 FL (ref 80–100)
MONOCYTES ABSOLUTE: 0.9 K/UL (ref 0–1.3)
MONOCYTES RELATIVE PERCENT: 13 %
NEUTROPHILS ABSOLUTE: 4.5 K/UL (ref 1.7–7.7)
NEUTROPHILS RELATIVE PERCENT: 62.7 %
PDW BLD-RTO: 14.6 % (ref 12.4–15.4)
PHOSPHORUS: 3.4 MG/DL (ref 2.5–4.9)
PLATELET # BLD: 363 K/UL (ref 135–450)
PMV BLD AUTO: 7.9 FL (ref 5–10.5)
POTASSIUM SERPL-SCNC: 3.6 MMOL/L (ref 3.5–5.1)
PROCALCITONIN: 0.13 NG/ML (ref 0–0.15)
RBC # BLD: 4.8 M/UL (ref 4.2–5.9)
SODIUM BLD-SCNC: 140 MMOL/L (ref 136–145)
TOTAL PROTEIN: 7.1 G/DL (ref 6.4–8.2)
WBC # BLD: 7.2 K/UL (ref 4–11)

## 2020-10-10 PROCEDURE — 6370000000 HC RX 637 (ALT 250 FOR IP): Performed by: INTERNAL MEDICINE

## 2020-10-10 PROCEDURE — 85025 COMPLETE CBC W/AUTO DIFF WBC: CPT

## 2020-10-10 PROCEDURE — 1200000000 HC SEMI PRIVATE

## 2020-10-10 PROCEDURE — 80053 COMPREHEN METABOLIC PANEL: CPT

## 2020-10-10 PROCEDURE — 6360000002 HC RX W HCPCS: Performed by: INTERNAL MEDICINE

## 2020-10-10 PROCEDURE — 94761 N-INVAS EAR/PLS OXIMETRY MLT: CPT

## 2020-10-10 PROCEDURE — 84145 PROCALCITONIN (PCT): CPT

## 2020-10-10 PROCEDURE — 2700000000 HC OXYGEN THERAPY PER DAY

## 2020-10-10 PROCEDURE — 2580000003 HC RX 258: Performed by: INTERNAL MEDICINE

## 2020-10-10 PROCEDURE — 84100 ASSAY OF PHOSPHORUS: CPT

## 2020-10-10 PROCEDURE — 85379 FIBRIN DEGRADATION QUANT: CPT

## 2020-10-10 PROCEDURE — 86140 C-REACTIVE PROTEIN: CPT

## 2020-10-10 PROCEDURE — 82728 ASSAY OF FERRITIN: CPT

## 2020-10-10 PROCEDURE — 2500000003 HC RX 250 WO HCPCS: Performed by: INTERNAL MEDICINE

## 2020-10-10 RX ADMIN — ENOXAPARIN SODIUM 40 MG: 40 INJECTION SUBCUTANEOUS at 21:20

## 2020-10-10 RX ADMIN — SODIUM CHLORIDE, PRESERVATIVE FREE 10 ML: 5 INJECTION INTRAVENOUS at 09:03

## 2020-10-10 RX ADMIN — ENOXAPARIN SODIUM 40 MG: 40 INJECTION SUBCUTANEOUS at 09:02

## 2020-10-10 RX ADMIN — REMDESIVIR 100 MG: 100 INJECTION, POWDER, LYOPHILIZED, FOR SOLUTION INTRAVENOUS at 14:15

## 2020-10-10 RX ADMIN — SODIUM CHLORIDE, PRESERVATIVE FREE 10 ML: 5 INJECTION INTRAVENOUS at 21:20

## 2020-10-10 RX ADMIN — AMLODIPINE BESYLATE 10 MG: 5 TABLET ORAL at 09:03

## 2020-10-10 RX ADMIN — DEXAMETHASONE 6 MG: 4 TABLET ORAL at 09:02

## 2020-10-10 ASSESSMENT — PAIN SCALES - GENERAL: PAINLEVEL_OUTOF10: 0

## 2020-10-10 NOTE — PLAN OF CARE
Problem: Falls - Risk of:  Goal: Will remain free from falls  Description: Will remain free from falls  Outcome: Ongoing  Note: Pt remains free from falls. Non skid socks on. Pt a standby assist, bed alarm active for safety. Bed locked and in lowest position. Call light and bedside table within reach. Will continue to monitor.

## 2020-10-10 NOTE — PROGRESS NOTES
Shift assessment completed. Pt A&O x4, VSS. Pt on 3.5 Lit of O2 via nasal cannula. Pt reports no increase in shortness of breath, and feeling better this morning. Non skid socks on, pt calls out appropriately for assistance when needed. Denies any needs at this time. Bed locked and in lowest position. Call light and bedside table within reach. Will continue to monitor.

## 2020-10-10 NOTE — PROGRESS NOTES
Hospitalist Progress Note  10/10/2020 1:33 PM  Subjective:   Admit Date: 10/5/2020  PCP: Santy Velásquez MD  Status: Inpatient [101]  Interval History: Hospital Day: 6, admitted with acute hypoxic respiratory failure secondary to COVID-19 pneumonia, treated with remdesivir and dexamethasone on Droplet plus contact isolation. Oxygen requirement at 3 LPM.  Less cough. History of present illness:  COVID-19 positive (10/3). Confirmed 10/5. Wife and patient tsted positive from outside lab and confirmed w/ onset of sxs on Monday, Sept 28. DIET GENERAL; Left antecubital peripheral IV (10/5, day #5)  Medications:     remdesivir   100 mg Intravenous Q24H (10/9, day #2)   enoxaparin  40 mg Subcutaneous BID   amLODIPine  10 mg Oral Daily   dexamethasone  6 mg Oral Daily     Recent Labs     10/08/20  0548 10/09/20  0531 10/10/20  0534   WBC 6.9 6.1 7.2   HGB 14.0 14.0 14.0    284 363   MCV 86.0 86.0 86.5     Recent Labs     10/08/20  0548 10/09/20  0531 10/10/20  0534    139 140   K 3.9 4.0 3.6    102 102   CO2 28 27 26   BUN 13 13 16   CREATININE 0.9 0.8* 0.9   GLUCOSE 107* 106* 97     Recent Labs     10/10/20  0534   AST 15   ALT 24   BILITOT 0.6   ALKPHOS 57     Procalcitonin (10/10) 0.13 ng/mL  D-dimer (10/10) < 200 ng/mL  Legionella and Strep Pneumoniae antigen (10/6) negative    SARS-CoV-2, GUERLINE (10/5) Detected    Objective:   Vitals:  /88   Pulse 88   Temp 98.2 °F (oral)   Resp 16   Ht 5' 11\"  Wt 147.4 kg  SpO2 96% on 3.5 LPM   BMI 45.31 kg/m²   General appearance: alert and cooperative with exam  Lungs: diffuse rhonchi, reasonable overall air movement  Heart: regular rate and rhythm, S1, S2 normal, no murmur, click, rub or gallop  Abdomen: soft, non-tender; bowel sounds normal; no masses,  no organomegaly  Extremities: extremities normal, atraumatic, no cyanosis or edema  Neurologic: No obvious focal neurologic deficits. Assessment and Plan:   1.  Acute hypoxic respiratory failure secondary to COVID-19 pneumonia:  Onset of symptoms Sept 28 with diagnosis (10/3) and confirmed SARS-CoV-2 (10/5). Continues to require supplemental oxygen. Treatment with dexamethasone and remdesivir. Droplet plus contact isolation for 20 days from onset of symptoms. Risk of severe disease increased by history of hypertension and class III obesity. Elevated LDH, mild lymphopenia, elevated fibrinogen 503 and d-dimer 278. 2. Hypertension:  Continues on amlodipine 5 mg daily. 3. Obstructive sleep apnea:   4. Class III obesity (BMI 89.6) complicates medical management and significant risk factor for severe COVID-19 syndrome. Advance Directive: Full Code  DVT prophylaxis with enoxaparin 40 mg sub-Q daily. Discharge planning: will require at least another two days inpatient status. Droplet plus contact isolation until October 18. May be discharged to appropriate isolation.        Renee Durand MD  [facility] [place] Hospitalist

## 2020-10-11 LAB
A/G RATIO: 1.2 (ref 1.1–2.2)
ALBUMIN SERPL-MCNC: 3.9 G/DL (ref 3.4–5)
ALP BLD-CCNC: 56 U/L (ref 40–129)
ALT SERPL-CCNC: 25 U/L (ref 10–40)
ANION GAP SERPL CALCULATED.3IONS-SCNC: 9 MMOL/L (ref 3–16)
AST SERPL-CCNC: 17 U/L (ref 15–37)
BASOPHILS ABSOLUTE: 0 K/UL (ref 0–0.2)
BASOPHILS RELATIVE PERCENT: 0.4 %
BILIRUB SERPL-MCNC: 0.6 MG/DL (ref 0–1)
BUN BLDV-MCNC: 21 MG/DL (ref 7–20)
CALCIUM SERPL-MCNC: 8.6 MG/DL (ref 8.3–10.6)
CHLORIDE BLD-SCNC: 104 MMOL/L (ref 99–110)
CO2: 27 MMOL/L (ref 21–32)
CREAT SERPL-MCNC: 1 MG/DL (ref 0.9–1.3)
EOSINOPHILS ABSOLUTE: 0.1 K/UL (ref 0–0.6)
EOSINOPHILS RELATIVE PERCENT: 1 %
GFR AFRICAN AMERICAN: >60
GFR NON-AFRICAN AMERICAN: >60
GLOBULIN: 3.2 G/DL
GLUCOSE BLD-MCNC: 102 MG/DL (ref 70–99)
HCT VFR BLD CALC: 42 % (ref 40.5–52.5)
HEMOGLOBIN: 14.2 G/DL (ref 13.5–17.5)
LYMPHOCYTES ABSOLUTE: 1.9 K/UL (ref 1–5.1)
LYMPHOCYTES RELATIVE PERCENT: 23 %
MCH RBC QN AUTO: 29.5 PG (ref 26–34)
MCHC RBC AUTO-ENTMCNC: 33.9 G/DL (ref 31–36)
MCV RBC AUTO: 86.9 FL (ref 80–100)
MONOCYTES ABSOLUTE: 1 K/UL (ref 0–1.3)
MONOCYTES RELATIVE PERCENT: 12.2 %
NEUTROPHILS ABSOLUTE: 5.1 K/UL (ref 1.7–7.7)
NEUTROPHILS RELATIVE PERCENT: 63.4 %
PDW BLD-RTO: 14.7 % (ref 12.4–15.4)
PLATELET # BLD: 353 K/UL (ref 135–450)
PMV BLD AUTO: 7.7 FL (ref 5–10.5)
POTASSIUM SERPL-SCNC: 3.6 MMOL/L (ref 3.5–5.1)
RBC # BLD: 4.83 M/UL (ref 4.2–5.9)
SODIUM BLD-SCNC: 140 MMOL/L (ref 136–145)
TOTAL PROTEIN: 7.1 G/DL (ref 6.4–8.2)
WBC # BLD: 8.1 K/UL (ref 4–11)

## 2020-10-11 PROCEDURE — 1200000000 HC SEMI PRIVATE

## 2020-10-11 PROCEDURE — 80053 COMPREHEN METABOLIC PANEL: CPT

## 2020-10-11 PROCEDURE — 2700000000 HC OXYGEN THERAPY PER DAY

## 2020-10-11 PROCEDURE — 2500000003 HC RX 250 WO HCPCS: Performed by: INTERNAL MEDICINE

## 2020-10-11 PROCEDURE — 6360000002 HC RX W HCPCS: Performed by: INTERNAL MEDICINE

## 2020-10-11 PROCEDURE — 85025 COMPLETE CBC W/AUTO DIFF WBC: CPT

## 2020-10-11 PROCEDURE — 2580000003 HC RX 258: Performed by: INTERNAL MEDICINE

## 2020-10-11 PROCEDURE — 6370000000 HC RX 637 (ALT 250 FOR IP): Performed by: INTERNAL MEDICINE

## 2020-10-11 PROCEDURE — 36415 COLL VENOUS BLD VENIPUNCTURE: CPT

## 2020-10-11 PROCEDURE — 94761 N-INVAS EAR/PLS OXIMETRY MLT: CPT

## 2020-10-11 RX ADMIN — SODIUM CHLORIDE, PRESERVATIVE FREE 10 ML: 5 INJECTION INTRAVENOUS at 21:53

## 2020-10-11 RX ADMIN — SODIUM CHLORIDE, PRESERVATIVE FREE 10 ML: 5 INJECTION INTRAVENOUS at 08:10

## 2020-10-11 RX ADMIN — REMDESIVIR 100 MG: 100 INJECTION, POWDER, LYOPHILIZED, FOR SOLUTION INTRAVENOUS at 12:08

## 2020-10-11 RX ADMIN — ENOXAPARIN SODIUM 40 MG: 40 INJECTION SUBCUTANEOUS at 08:10

## 2020-10-11 RX ADMIN — DEXAMETHASONE 6 MG: 4 TABLET ORAL at 08:10

## 2020-10-11 RX ADMIN — ENOXAPARIN SODIUM 40 MG: 40 INJECTION SUBCUTANEOUS at 21:53

## 2020-10-11 RX ADMIN — AMLODIPINE BESYLATE 10 MG: 5 TABLET ORAL at 08:10

## 2020-10-11 ASSESSMENT — PAIN SCALES - GENERAL: PAINLEVEL_OUTOF10: 0

## 2020-10-11 NOTE — PROGRESS NOTES
VSS - afebrile. Pt is alert and oriented x 4 with no history of falls. Assessment completed as charted. Bed is in lowest position with 2/4 bed rails raised, wheels locked and call light within reach - patient wearing non-skid socks and verbalizes understanding to call out for assistance. No further requests at this time. Will continue to monitor.    Vitals:    10/10/20 2100   BP: (!) 141/92   Pulse: 87   Resp: 16   Temp: 98.1 °F (36.7 °C)   SpO2: 95%

## 2020-10-11 NOTE — PROGRESS NOTES
Shift assessment completed. Pt A&O x4, VSS. Pt reporting no increase in shortness of breath. Pt weaned down to 2.5 Liters of O2 via nasal cannula. Non skid socks on. Pt independent with ambulation. Denies any needs at this time. Bed locked and in lowest position. Call light and bedside table within reach. Will continue to monitor.

## 2020-10-11 NOTE — CARE COORDINATION
Chart reviewed day 5. COVID +. Current O2 2. 5LNC with sats of 95%. Patient from home and IPTA. CM will follow for needs. Jonny Gomez RN

## 2020-10-12 LAB
A/G RATIO: 1.1 (ref 1.1–2.2)
ALBUMIN SERPL-MCNC: 3.7 G/DL (ref 3.4–5)
ALP BLD-CCNC: 55 U/L (ref 40–129)
ALT SERPL-CCNC: 41 U/L (ref 10–40)
ANION GAP SERPL CALCULATED.3IONS-SCNC: 9 MMOL/L (ref 3–16)
AST SERPL-CCNC: 32 U/L (ref 15–37)
BASOPHILS ABSOLUTE: 0.1 K/UL (ref 0–0.2)
BASOPHILS RELATIVE PERCENT: 0.8 %
BILIRUB SERPL-MCNC: 0.6 MG/DL (ref 0–1)
BUN BLDV-MCNC: 22 MG/DL (ref 7–20)
C-REACTIVE PROTEIN: 13.7 MG/L (ref 0–5.1)
CALCIUM SERPL-MCNC: 8.5 MG/DL (ref 8.3–10.6)
CHLORIDE BLD-SCNC: 102 MMOL/L (ref 99–110)
CO2: 27 MMOL/L (ref 21–32)
CREAT SERPL-MCNC: 0.9 MG/DL (ref 0.9–1.3)
D DIMER: 212 NG/ML DDU (ref 0–229)
EOSINOPHILS ABSOLUTE: 0.1 K/UL (ref 0–0.6)
EOSINOPHILS RELATIVE PERCENT: 1.3 %
FERRITIN: 1135 NG/ML (ref 30–400)
GFR AFRICAN AMERICAN: >60
GFR NON-AFRICAN AMERICAN: >60
GLOBULIN: 3.3 G/DL
GLUCOSE BLD-MCNC: 97 MG/DL (ref 70–99)
HCT VFR BLD CALC: 43.1 % (ref 40.5–52.5)
HEMOGLOBIN: 14.5 G/DL (ref 13.5–17.5)
LYMPHOCYTES ABSOLUTE: 1.9 K/UL (ref 1–5.1)
LYMPHOCYTES RELATIVE PERCENT: 21.9 %
MCH RBC QN AUTO: 29.1 PG (ref 26–34)
MCHC RBC AUTO-ENTMCNC: 33.5 G/DL (ref 31–36)
MCV RBC AUTO: 86.7 FL (ref 80–100)
MONOCYTES ABSOLUTE: 0.9 K/UL (ref 0–1.3)
MONOCYTES RELATIVE PERCENT: 9.7 %
NEUTROPHILS ABSOLUTE: 5.9 K/UL (ref 1.7–7.7)
NEUTROPHILS RELATIVE PERCENT: 66.3 %
PDW BLD-RTO: 14.6 % (ref 12.4–15.4)
PLATELET # BLD: 359 K/UL (ref 135–450)
PMV BLD AUTO: 7.8 FL (ref 5–10.5)
POTASSIUM SERPL-SCNC: 3.7 MMOL/L (ref 3.5–5.1)
PROCALCITONIN: 0.14 NG/ML (ref 0–0.15)
RBC # BLD: 4.97 M/UL (ref 4.2–5.9)
SODIUM BLD-SCNC: 138 MMOL/L (ref 136–145)
TOTAL PROTEIN: 7 G/DL (ref 6.4–8.2)
WBC # BLD: 8.9 K/UL (ref 4–11)

## 2020-10-12 PROCEDURE — 86140 C-REACTIVE PROTEIN: CPT

## 2020-10-12 PROCEDURE — 84145 PROCALCITONIN (PCT): CPT

## 2020-10-12 PROCEDURE — 6360000002 HC RX W HCPCS: Performed by: INTERNAL MEDICINE

## 2020-10-12 PROCEDURE — 85025 COMPLETE CBC W/AUTO DIFF WBC: CPT

## 2020-10-12 PROCEDURE — 36415 COLL VENOUS BLD VENIPUNCTURE: CPT

## 2020-10-12 PROCEDURE — 80053 COMPREHEN METABOLIC PANEL: CPT

## 2020-10-12 PROCEDURE — 2580000003 HC RX 258: Performed by: INTERNAL MEDICINE

## 2020-10-12 PROCEDURE — 6370000000 HC RX 637 (ALT 250 FOR IP): Performed by: INTERNAL MEDICINE

## 2020-10-12 PROCEDURE — 99232 SBSQ HOSP IP/OBS MODERATE 35: CPT | Performed by: INTERNAL MEDICINE

## 2020-10-12 PROCEDURE — 2500000003 HC RX 250 WO HCPCS: Performed by: INTERNAL MEDICINE

## 2020-10-12 PROCEDURE — 2700000000 HC OXYGEN THERAPY PER DAY

## 2020-10-12 PROCEDURE — 82728 ASSAY OF FERRITIN: CPT

## 2020-10-12 PROCEDURE — 1200000000 HC SEMI PRIVATE

## 2020-10-12 PROCEDURE — 94761 N-INVAS EAR/PLS OXIMETRY MLT: CPT

## 2020-10-12 PROCEDURE — 85379 FIBRIN DEGRADATION QUANT: CPT

## 2020-10-12 RX ADMIN — DEXAMETHASONE 6 MG: 4 TABLET ORAL at 08:13

## 2020-10-12 RX ADMIN — AMLODIPINE BESYLATE 10 MG: 5 TABLET ORAL at 08:13

## 2020-10-12 RX ADMIN — ENOXAPARIN SODIUM 40 MG: 40 INJECTION SUBCUTANEOUS at 20:11

## 2020-10-12 RX ADMIN — SODIUM CHLORIDE, PRESERVATIVE FREE 10 ML: 5 INJECTION INTRAVENOUS at 08:15

## 2020-10-12 RX ADMIN — ENOXAPARIN SODIUM 40 MG: 40 INJECTION SUBCUTANEOUS at 08:14

## 2020-10-12 RX ADMIN — REMDESIVIR 100 MG: 100 INJECTION, POWDER, LYOPHILIZED, FOR SOLUTION INTRAVENOUS at 12:03

## 2020-10-12 NOTE — PROGRESS NOTES
Shift assessment completed and charted. VSS. Pt weaned to 2L NC, refused for RN to lower o2 to 1L NC, when he was stat 94%, this RN left pt on 2L NC overnight. Bed locked and in lowest position. Call light within reach. Pt denies any other needs at this time. Will continue to monitor.

## 2020-10-12 NOTE — PROGRESS NOTES
Infectious Disease Follow up Notes    CC :  COVID-19      Antibiotics:   None     Decadron 6mg tab daily, started 10/6/20     Admit Date:   10/5/2020  Hospital Day: 8    Subjective:   He remains afebrile  Currently on 3-3.5L NC with SpO2 ~96%. Weaned to 1L NC during this encounter with sustained oxygen saturations mid-90s. He says he feels tired but overall feeling better. Objective:     Patient Vitals for the past 8 hrs:   BP Temp Temp src Pulse Resp SpO2   10/12/20 0813 127/80 -- -- -- -- --   10/12/20 0812 -- 98 °F (36.7 °C) Oral 88 16 97 %   10/12/20 0803 -- -- -- -- -- 94 %       EXAM:  General:  Morbidly obese.   Alert, oriented, NAD    HEENT:  NCAT, PERRL, sclera anicteric  Non-labored breathing   Tachypneic     EXT:  No focal rash         LINE: IV site ok         Scheduled Meds:   remdesivir IVPB  100 mg Intravenous Q24H    enoxaparin  40 mg Subcutaneous BID    amLODIPine  10 mg Oral Daily    sodium chloride flush  10 mL Intravenous 2 times per day    dexamethasone  6 mg Oral Daily          Data Review:    Lab Results   Component Value Date    WBC 8.9 10/12/2020    HGB 14.5 10/12/2020    HCT 43.1 10/12/2020    MCV 86.7 10/12/2020     10/12/2020     Lab Results   Component Value Date    CREATININE 0.9 10/12/2020    BUN 22 (H) 10/12/2020     10/12/2020    K 3.7 10/12/2020     10/12/2020    CO2 27 10/12/2020       Hepatic Function Panel:   Lab Results   Component Value Date    ALKPHOS 55 10/12/2020    ALT 41 10/12/2020    AST 32 10/12/2020    PROT 7.0 10/12/2020    PROT 6.9 01/27/2012    BILITOT 0.6 10/12/2020    BILIDIR <0.2 06/04/2019    IBILI see below 06/04/2019    LABALBU 3.7 10/12/2020         10/5  ddimer  278  Ferritin  1040  CRP   71      Cultures:   10/3     COVID+  10/5     COVID+  10/6     Legionella ag neg   10/8     Sputum cx neg         Radiology Review:  All pertinent images / reports were reviewed as a part of this visit. CXR 10/5/20      Impression    No gross evidence of an acute pulmonary embolus on this motion degraded study.         Multifocal bilateral ground-glass opacities involving all lobes of both lungs    with basilar consolidation and mild volume loss.  Multifocal pneumonia with    basilar atelectasis suspected, possibly an atypical or viral pneumonia. Imaging features can be seen with COVID-19 pneumonia, though are nonspecific    and can occur with a variety of infectious and noninfectious processes. PneInd       Assessment:     Patient Active Problem List    Diagnosis Date Noted    Hypoxia 10/06/2020    PNA (pneumonia) 10/05/2020    Pure hypercholesterolemia 01/10/2020    Viral URI 01/03/2018    Corporo-venous occlusive erectile dysfunction 10/13/2017    Obstructive sleep apnea syndrome 10/13/2016    Slow transit constipation 07/05/2016    Chronic migraine without aura without status migrainosus, not intractable 04/29/2016    Morbid obesity due to excess calories ([facility] [address] 04/29/2016    Essential hypertension 04/29/2016     Overview Note:     No change in meds, no c/o with meds, no chest pain, SOB, palpatations, or syncope. Home bp was good.          SARS-CoV-2 infection with acute hypoxemic respiratory failure   Date of onset of symptoms 9/28/20  Date of diagnosis 10/3/20, confirmed 10/5/20  Admitted 10/5/20 with acute hypoxemic respiratory failure   At increased risk of severe disease and respiratory failure due to obesity and HTN   -clinically improving  -continue decadron as ordered  -continue remdesivir   -supportive care  -DVT ppx    Home soon if he continues to improve and declining oxygen requirement to complete 10d steroid  Isolation x20d from date of diagnosis which was 9/28/20    Discussed with patient/family, all questions answered        Citlalli Negrete MD  Phone: [phone]   Fax : [phone]

## 2020-10-12 NOTE — PROGRESS NOTES
Hospitalist Progress Note    Date of Admission: 10/5/2020    Chief Complaint: SOB    Hospital Course: History of present illness:  Admitted with acute hypoxic respiratory failure secondary to COVID-19 pneumonia, treated with remdesivir and dexamethasone on Droplet plus contact isolation. Oxygen requirement at 3 LPM.  Less cough. COVID-19 positive (10/3). Confirmed 10/5. Wife and patient tsted positive from outside lab and confirmed w/ onset of sxs on Monday, Sept 28. He is working from home. Several home projects for which he has been going to [employer]. Subjective: Weaned off O2. No SOB. No significant cough. Labs:   Recent Labs     10/10/20  0534 10/11/20  0535 10/12/20  0604   WBC 7.2 8.1 8.9   HGB 14.0 14.2 14.5   HCT 41.5 42.0 43.1    353 359     Recent Labs     10/10/20  0534 10/11/20  0535 10/12/20  0604    140 138   K 3.6 3.6 3.7    104 102   CO2 26 27 27   BUN 16 21* 22*   CREATININE 0.9 1.0 0.9   CALCIUM 8.7 8.6 8.5   PHOS 3.4  --   --      Recent Labs     10/10/20  0534 10/11/20  0535 10/12/20  0604   AST 15 17 32   ALT 24 25 41*   BILITOT 0.6 0.6 0.6   ALKPHOS 57 56 55     No results for input(s): INR in the last 72 hours. Physical Exam Performed:    BP (!) 142/83   Pulse 91   Temp 98.1 °F (36.7 °C) (Oral)   Resp 16   Ht 5' 11\" (1.803 m)   Wt (!) 324 lb 14.4 oz (147.4 kg)   SpO2 92%   BMI 45.31 kg/m²     General appearance: No apparent distress, appears stated age and cooperative. HEENT: Pupils equal, round, and reactive to light. Conjunctivae/corneas clear. Neck: Supple, no jugular venous distention. Trachea midline with full range of motion. Respiratory:  Normal respiratory effort. Clear to auscultation, bilaterally without Rales/Wheezes/Rhonchi. Cardiovascular: Regular rate and rhythm with normal S1/S2 without murmurs, rubs or gallops. Abdomen: Soft, non-tender, non-distended with normal bowel sounds.   Musculoskelatal: No clubbing, cyanosis or edema bilaterally. Full range of motion without deformity. Neurologic:  Neurovascularly intact without any focal sensory/motor deficits. Cranial nerves: II-XII intact, grossly non-focal.  Psychiatric: Alert and oriented, thought content appropriate, normal insight  Skin: Skin color, texture, turgor normal.  No rashes or lesions. Capillary Refill: Brisk,< 3 seconds   Peripheral Pulses: +2 palpable, equal bilaterally       Assessment/Plan:    Active Hospital Problems    Diagnosis    Hypoxia [R09.02]    PNA (pneumonia) [J18.9]    Obstructive sleep apnea syndrome [G47.33]    Morbid obesity due to excess calories ([facility] [address] [E66.01]     1. Acute hypoxic respiratory failure secondary to COVID-19 pneumonia:  Onset of symptoms Sept 28 with diagnosis (10/3) and confirmed SARS-CoV-2 (10/5). Continues to require supplemental oxygen. Treatment with dexamethasone and remdesivir. Droplet plus contact isolation for 20 days from onset of symptoms. Risk of severe disease increased by history of hypertension and class III obesity. Elevated LDH, mild lymphopenia, elevated fibrinogen 503 and d-dimer 278. 2. Hypertension: No prior history. Continues on amlodipine 5 mg daily. Will consider continuing at ID and follow-up with PCP. 3. Obstructive sleep apnea: Using CPAP from home. 4. Class III obesity (BMI 37.6) complicates medical management and significant risk factor for severe COVID-19 syndrome.        DVT prophylaxis with enoxaparin 40 mg sub-Q daily. Diet: DIET GENERAL;  Code Status: Full Code  PT/OT Eval Status: NA    Dispo - Tuesday, October 13 after completing five days of remdesivir  Droplet plus contact isolation until October 18.      Dwayne Akins MD

## 2020-10-12 NOTE — PLAN OF CARE
Problem: Falls - Risk of:  Goal: Will remain free from falls  Description: Will remain free from falls  Outcome: Ongoing  Note: Pt remains safe. Uses call light appropriately to call out for assistance. Bed locked in lowest position; side rails 2/; call light and bedside table within reach of pt.

## 2020-10-13 VITALS
RESPIRATION RATE: 16 BRPM | WEIGHT: 315 LBS | OXYGEN SATURATION: 93 % | TEMPERATURE: 98.2 F | HEART RATE: 85 BPM | SYSTOLIC BLOOD PRESSURE: 143 MMHG | HEIGHT: 71 IN | BODY MASS INDEX: 44.1 KG/M2 | DIASTOLIC BLOOD PRESSURE: 90 MMHG

## 2020-10-13 LAB
A/G RATIO: 1.1 (ref 1.1–2.2)
ALBUMIN SERPL-MCNC: 3.6 G/DL (ref 3.4–5)
ALP BLD-CCNC: 54 U/L (ref 40–129)
ALT SERPL-CCNC: 49 U/L (ref 10–40)
ANION GAP SERPL CALCULATED.3IONS-SCNC: 9 MMOL/L (ref 3–16)
AST SERPL-CCNC: 28 U/L (ref 15–37)
BILIRUB SERPL-MCNC: 0.6 MG/DL (ref 0–1)
BUN BLDV-MCNC: 21 MG/DL (ref 7–20)
CALCIUM SERPL-MCNC: 8.4 MG/DL (ref 8.3–10.6)
CHLORIDE BLD-SCNC: 104 MMOL/L (ref 99–110)
CO2: 27 MMOL/L (ref 21–32)
CREAT SERPL-MCNC: 0.9 MG/DL (ref 0.9–1.3)
GFR AFRICAN AMERICAN: >60
GFR NON-AFRICAN AMERICAN: >60
GLOBULIN: 3.2 G/DL
GLUCOSE BLD-MCNC: 103 MG/DL (ref 70–99)
POTASSIUM SERPL-SCNC: 3.8 MMOL/L (ref 3.5–5.1)
SODIUM BLD-SCNC: 140 MMOL/L (ref 136–145)
TOTAL PROTEIN: 6.8 G/DL (ref 6.4–8.2)

## 2020-10-13 PROCEDURE — 6360000002 HC RX W HCPCS: Performed by: INTERNAL MEDICINE

## 2020-10-13 PROCEDURE — 6370000000 HC RX 637 (ALT 250 FOR IP): Performed by: INTERNAL MEDICINE

## 2020-10-13 PROCEDURE — 2580000003 HC RX 258: Performed by: INTERNAL MEDICINE

## 2020-10-13 PROCEDURE — 2500000003 HC RX 250 WO HCPCS: Performed by: INTERNAL MEDICINE

## 2020-10-13 PROCEDURE — 80053 COMPREHEN METABOLIC PANEL: CPT

## 2020-10-13 RX ORDER — DEXAMETHASONE 6 MG/1
6 TABLET ORAL DAILY
Qty: 2 TABLET | Refills: 0 | Status: SHIPPED | OUTPATIENT
Start: 2020-10-14 | End: 2020-10-16

## 2020-10-13 RX ORDER — AMLODIPINE BESYLATE 10 MG/1
10 TABLET ORAL DAILY
Qty: 30 TABLET | Refills: 0 | Status: SHIPPED | OUTPATIENT
Start: 2020-10-14 | End: 2020-11-06 | Stop reason: SDUPTHER

## 2020-10-13 RX ADMIN — ENOXAPARIN SODIUM 40 MG: 40 INJECTION SUBCUTANEOUS at 08:48

## 2020-10-13 RX ADMIN — SODIUM CHLORIDE 30 ML: 9 INJECTION, SOLUTION INTRAVENOUS at 12:13

## 2020-10-13 RX ADMIN — SODIUM CHLORIDE, PRESERVATIVE FREE 10 ML: 5 INJECTION INTRAVENOUS at 08:48

## 2020-10-13 RX ADMIN — REMDESIVIR 100 MG: 100 INJECTION, POWDER, LYOPHILIZED, FOR SOLUTION INTRAVENOUS at 12:14

## 2020-10-13 RX ADMIN — DEXAMETHASONE 6 MG: 4 TABLET ORAL at 08:49

## 2020-10-13 RX ADMIN — AMLODIPINE BESYLATE 10 MG: 5 TABLET ORAL at 08:49

## 2020-10-13 NOTE — PROGRESS NOTES
Assessment completed and documented. VSS, RA. Denies SOB. A/ox4. Denies pain. Resting comfortably in bed. Ambulates independently, calls out appropriately. Denies further needs at this time. Bed locked and in lowest position. Bedside table and call light within reach. Will continue to monitor.

## 2020-10-13 NOTE — PLAN OF CARE
Problem: Falls - Risk of:  Goal: Will remain free from falls  Description: Will remain free from falls  10/12/2020 2053 by Santiago Fuentes RN  Outcome: Ongoing  10/12/2020 1539 by Juana Gomez RN  Outcome: Ongoing  Note:  Pt remained free of falls. Call light within reach. A/ox4, calls out appropriately. Ambulates independently. Non skid footwear in place. Bed locked and in lowest position. Will continue to monitor.

## 2020-10-13 NOTE — ADT AUTH CERT
Pneumonia - Care Day 3 (10/8/2020) by Mary Rae RN         Review Status  Review Entered    Completed  10/12/2020 10:04        Criteria Review       Care Day: 3 Care Date: 10/8/2020 Level of Care: Inpatient Floor    Guideline Day 3    Clinical Status    ( ) * Hemodynamic stability    10/12/2020 10:04 AM EDT by Kristi Peng      145/93  88  98.2   91%  3 liter nc    ( ) * Afebrile or temperature acceptable for next level of care    (X) * Tachypnea absent    10/12/2020 10:04 AM EDT by Kristi Peng      18    ( ) * Hypoxemia absent    10/12/2020 10:04 AM EDT by Kristi Peng      requiring nc O2    ( ) * Mental status at baseline    ( ) * Antibiotic regimen acceptable for next level of care    ( ) * Discharge plans and education understood    Activity    ( ) * Ambulatory or acceptable for next level of care    Routes    (X) * Oral hydration, medications, and diet    10/12/2020 10:04 AM EDT by Kristi Peng      apresoline 10mg iv given, norvasc 10mg qd began  lovenox 40mg sc bid  decadron 6mg qd    general diet    Interventions    ( ) * Oxygen absent or at baseline need    ( ) * Isolation not indicated, or is performable at next level of care    10/12/2020 10:04 AM EDT by Kristi Peng      droplet plus isol covid +    (X) WBC    10/12/2020 10:04 AM EDT by Kristi Peng      wbc 6.1    * Milestone    Additional Notes    10/8    Per ID consult:    SARS-CoV-2 infection    Date of onset of symptoms 9/28/20    Date of diagnosis 10/3/20, confirmed 10/5/20    Admitted 10/5/20 with acute hypoxemic respiratory failure    Clinical status stabilized with decline in fever and reduced oxygen requirement since admission with initiation of steroids    At increased risk of severe disease and respiratory failure due to obesity and HTN    -continue steroid as ordered    -given interval improvement, hold off on any other COVID-specific therapy including antiviral therapy    -supportive care    -isolation as ordered, minimum 10d from onset of symptoms    -trend ddimer, procal, CRP    -DVT ppx

## 2020-10-14 ENCOUNTER — CARE COORDINATION (OUTPATIENT)
Dept: CASE MANAGEMENT | Age: 50
End: 2020-10-14

## 2020-10-14 NOTE — ADT AUTH CERT
Pneumonia - Care Day 6 (10/11/2020) by Jacques Campuzano RN         Review Status  Review Entered    Completed  10/13/2020 13:43        Criteria Review       Care Day: 6 Care Date: 10/11/2020 Level of Care: Inpatient Floor    Guideline Day 3    Clinical Status    (X) * Hemodynamic stability    (X) * Afebrile or temperature acceptable for next level of care    (X) * Tachypnea absent    ( ) * Hypoxemia absent    10/13/2020 1:43 PM EDT by Liza Ruano      10/11/20 1410   98 (36.7)   16   92   143/85Abnormal      Semi fowlers     2   93   Nasal cannula     10/11/20 2159      90      1    94   Nasal cannula    (X) * Mental status at baseline    ( ) * Antibiotic regimen acceptable for next level of care    ( ) * Discharge plans and education understood    Activity    ( ) * Ambulatory or acceptable for next level of care    Routes    (X) * Oral hydration, medications, and diet    Interventions    ( ) * Oxygen absent or at baseline need    ( ) * Isolation not indicated, or is performable at next level of care    (X) WBC    * Milestone    Additional Notes    10/11/2020 05:35    Sodium: 140    Potassium: 3.6    Chloride: 104    CO2: 27    BUN: 21 (H)    Creatinine: 1.0    Anion Gap: 9    GFR Non-: >60    GFR African American: >60    Glucose: 102 (H)    Calcium: 8.6    Total Protein: 7.1    Albumin: 3.9    Globulin: 3.2    Albumin/Globulin Ratio: 1.2    Alk Phos: 56    ALT: 25    AST: 17    Bilirubin: 0.6    WBC: 8.1    RBC: 4.83    Hemoglobin Quant: 14.2    Hematocrit: 42.0    MCV: 86.9    MCH: 29.5    MCHC: 33.9    MPV: 7.7    RDW: 14.7    Platelet Count: 763    Neutrophils %: 63.4    Lymphocyte %: 23.0    Monocytes %: 12.2    Eosinophils %: 1.0    Basophils %: 0.4    Neutrophils Absolute: 5.1    Lymphocytes Absolute: 1.9    Monocytes Absolute: 1.0    Eosinophils Absolute: 0.1    Basophils Absolute: 0.0       Scheduled Meds:enoxaparin, 40 mg, Subcutaneous, BID    amLODIPine, 10 mg, Oral, Daily    sodium chloride flush, 10 mL, Intravenous, 2 times per day    dexamethasone, 6 mg, Oral, Daily    remdesivir 100 mg in sodium chloride 0.9 % 250 mL IVPB  q24       PRN Meds:.sodium chloride, sodium chloride, hydrALAZINE, sodium chloride flush, polyethylene glycol, promethazine **OR** ondansetron, acetaminophen **OR** acetaminophen       IM:    Interval History: Hospital Day: 7, decreasing oxygen requirement down to 2.5 LPM.  He has CPAP from home, which he started in March.           History of present illness:  Admitted with acute hypoxic respiratory failure secondary to COVID-19 pneumonia, treated with remdesivir and dexamethasone on Droplet plus contact isolation.  Oxygen requirement at 3 LPM.  Less cough.  COVID-19 positive (10/3).   Confirmed 10/5.    Wife and patient tsted positive from outside lab and confirmed w/ onset of sxs on Monday, Sept 28.  He is working from home. [employer] projects for which he has been going to [employer] [place] [employer]. Assessment and Plan:    1. Acute hypoxic respiratory failure secondary to COVID-19 pneumonia:  Onset of symptoms Sept 28 with diagnosis (10/3) and confirmed SARS-CoV-2 (10/5).  Continues to require supplemental oxygen.  Treatment with dexamethasone and remdesivir.  Droplet plus contact isolation for 20 days from onset of symptoms.   Risk of severe disease increased by history of hypertension and class III obesity.  Elevated LDH, mild lymphopenia, elevated fibrinogen 503 and d-dimer 278. 2. Hypertension:  Continues on amlodipine 5 mg daily. [place] has not been on anti-hypertensive prior to hospitalization including ACE inhibitors.      3. Obstructive sleep apnea: Using CPAP from home. 4. Class III obesity (BMI 62.8) complicates medical management and significant risk factor for severe COVID-19 syndrome.          Advance Directive: Full Code    DVT prophylaxis with enoxaparin 40 mg sub-Q daily.     Discharge planning:  Tuesday, October 13 after completing five days of remdesivir    Droplet plus contact isolation until October 18.  May be discharged to appropriate isolation.   He can continue supplemental oxygen if needed.                  Pneumonia - Care Day 4 (10/9/2020) by Kevin Desai RN         Review Status  Review Entered    Completed  10/13/2020 13:43        Criteria Review       Care Day: 4 Care Date: 10/9/2020 Level of Care: Inpatient Floor    Guideline Day 3    Clinical Status    (X) * Hemodynamic stability    10/13/2020 1:43 PM EDT by Yunier Zavala      BP (!) 145/93   Pulse 88   Temp 98.2 °F (36.8 °C) (Oral)   Resp 18   Ht 5' 11\" (1.803 m)   Wt (!) 324 lb 14.4 oz (147.4 kg)   SpO2 91%   BMI 45.31 kg/m²    (X) * Afebrile or temperature acceptable for next level of care    (X) * Tachypnea absent    ( ) * Hypoxemia absent    (X) * Mental status at baseline    ( ) * Antibiotic regimen acceptable for next level of care    ( ) * Discharge plans and education understood    Activity    ( ) * Ambulatory or acceptable for next level of care    Routes    (X) * Oral hydration, medications, and diet    Interventions    ( ) * Oxygen absent or at baseline need    ( ) * Isolation not indicated, or is performable at next level of care    (X) WBC    * Milestone    Additional Notes    IM:    Subjective: no new c/o. Clinically improving but still w/ O2 requiriment.         Assessment/Plan:         Active Hospital Problems      Diagnosis    · Hypoxia [R09.02]    · PNA (pneumonia) [J18.9]    · Obstructive sleep apnea syndrome [G47.33]    · Morbid obesity due to excess calories (HCC) [E66.01]                   PNA - Likely COVID-19.  NOT started on empiric ABX.  CT chest showing multifocal bilateral GGO.     Labs showing elevated LDH, mild lymphopenia, elevated fibrinogen 503 and d-dimer 278    - Procalcitonin and WBC count normal, hold off on IV antibiotics    - Started Decadron 6 mg daily p.o. since patient requiring oxygen    - COVID-19 positive.  Discussed w/ wife April extensively via telecon 7 October and both she and  tested positive from outside lab and confirmed w/ onset of sxs Monday 28 Sept. Infectious disease consulted and appreciated now on Steroids, Remdesivir started 9 Sept    - Other COVID-19 labs for prognostication pending    - Droplet plus isolation    - Sputum culture, Gram stain, urine antigens ordered and pending         Acute Respiratory Failure - w/ hypoxia, POArrival, 2nd to above.  Supplemental O2 and wean as tolerated.         Morbid Obesity -  With Body mass index is 45.31 kg/m². Complicating assessment and treatment. Placing patient at risk for multiple co-morbidities as well as early death and contributing to the patient's presentation. Counseled on weight loss.         RUBI - likely 2nd to obesity.  Controlled on home CPAP/BiPap - continued, w/ outpatient f/u as previously arranged.               DVT Prophylaxis: LMWH - titrated up Thurs 8 October. Diet: DIET GENERAL;    Code Status: Full Code              PT/OT Eval Status: NOT yet ordered.         Dispo - pending clinical course and subspecialty recs. Discussed via FaceTime w/ wife while at bedside 8 October.              ID:    Subjective:    He remains afebrile    Currently on 3-3.5L NC with SpO2 ~92%    He says he feels tired. [address] infrequently.  Without new concerns otherwise.            SARS-CoV-2 infection with acute hypoxemic respiratory failure    Date of onset of symptoms 9/28/20    Date of diagnosis 10/3/20, confirmed 10/5/20    Admitted 10/5/20 with acute hypoxemic respiratory failure    At increased risk of severe disease and respiratory failure due to obesity and HTN         No appreciable improvement, saturations remain in low 90s    -continue decadron as ordered    -add remdesivir, discussed with patient    -trend procal, ddimer, CRP, ferritin    -supportive care    -DVT ppx

## 2020-10-15 ENCOUNTER — CARE COORDINATION (OUTPATIENT)
Dept: CASE MANAGEMENT | Age: 50
End: 2020-10-15

## 2020-10-15 NOTE — CARE COORDINATION
COVID-19 Monitoring Call:    Second and final attempt made to reach patient for initial post hospital transition call. VM left stating purpose of call along with my contact information requesting a return call.         Nakita ALBERTN, RN, [facility]  Care Transition Nurse   [phone]

## 2020-10-15 NOTE — ADT AUTH CERT
Pneumonia - Care Day 7 (10/12/2020) by Saskia Sharma RN         Review Status  Review Entered    Completed  10/15/2020 08:29        Criteria Review       Care Day: 7 Care Date: 10/12/2020 Level of Care: Inpatient Floor    Guideline Day 3    Clinical Status    (X) * Hemodynamic stability    10/15/2020 8:29 AM EDT by Conchita Persaud      16  88  142/83  93%  1 liter nc    (X) * Afebrile or temperature acceptable for next level of care    10/15/2020 8:29 AM EDT by Conchita Persaud      98.2    (X) * Tachypnea absent    ( ) * Hypoxemia absent    10/15/2020 8:29 AM EDT by Dahlia Freedman weaned to 1 liter nc    (X) * Mental status at baseline    ( ) * Antibiotic regimen acceptable for next level of care    ( ) * Discharge plans and education understood    Activity    (X) * Ambulatory or acceptable for next level of care    Routes    (X) * Oral hydration, medications, and diet    10/15/2020 8:29 AM EDT by Conchita Persaud      decadron 6 mg qd    lovenox 40 mg sc bid  remdesivir 100mg iv qd  norvasc 5 mg qd    Interventions    ( ) * Oxygen absent or at baseline need    ( ) * Isolation not indicated, or is performable at next level of care    10/15/2020 8:29 AM EDT by Conchita Persaud      droplet plus isolation COVID -19 (9/28)    (X) WBC    10/15/2020 8:29 AM EDT by Conchita Persaud      8.9    * Milestone    Additional Notes    10/12    Per Hospitalist:    Weaned to 1L NC during this encounter with sustained oxygen saturations mid-90s. He says he feels tired but overall feeling better.

## 2020-10-19 ENCOUNTER — OFFICE VISIT (OUTPATIENT)
Dept: PRIMARY CARE CLINIC | Age: 50
End: 2020-10-19
Payer: COMMERCIAL

## 2020-10-19 PROCEDURE — 99211 OFF/OP EST MAY X REQ PHY/QHP: CPT | Performed by: NURSE PRACTITIONER

## 2020-10-19 NOTE — PATIENT INSTRUCTIONS

## 2020-10-20 LAB — SARS-COV-2, NAA: NOT DETECTED

## 2020-10-21 NOTE — RESULT ENCOUNTER NOTE

## 2020-11-04 NOTE — DISCHARGE SUMMARY
Hospital Medicine Discharge Summary    Patient: Shakila Ventura     Age: 53 y.o. Gender: male  : 1970   MRN: [id number]  Code Status: Full     Admit Date: 10/5/2020   Discharge Date: 10/13/2020    Disposition:  Home     Condition at Discharge: Stable    Primary Care Provider: Renee Hsu MD    Admitting Physician: Sid Felix MD  Discharge Physician: Melissa Lindsay MD       Discharge Diagnoses: Active Hospital Problems    Diagnosis    Hypoxia [R09.02]    PNA (pneumonia) [J18.9]    Obstructive sleep apnea syndrome [G47.33]    Morbid obesity due to excess calories [facility]) [E66.01]       Hospital Course:   History of present illness:  Admitted with acute hypoxic respiratory failure secondary to COVID-19 pneumonia, treated with remdesivir and dexamethasone on Droplet plus contact isolation. Oxygen requirement at 3 LPM.  Less cough. COVID-19 positive (10/3). Confirmed 10/5. Wife and patient tsted positive from outside lab and confirmed w/ onset of sxs on Monday, . He is working from home. Several home projects for which he has been going to [place] Assessment/Plan:    1. Acute hypoxic respiratory failure secondary to COVID-19 pneumonia:  Onset of symptoms  with diagnosis (10/3) and confirmed SARS-CoV-2 (10/5). Continues to require supplemental oxygen. Treatment with dexamethasone and remdesivir. Droplet plus contact isolation for 20 days from onset of symptoms. Risk of severe disease increased by history of hypertension and class III obesity. Elevated LDH, mild lymphopenia, elevated fibrinogen 503 and d-dimer 278. 2. Hypertension: No prior history. Continue on amlodipine 5 mg daily. 3. Obstructive sleep apnea: Using CPAP from home.    4. Class III obesity (BMI 44.5) complicates medical management and significant risk factor for severe COVID-19 syndrome.        Exam:   BP (!) 143/90   Pulse 85   Temp 98.2 °F (36.8 °C) (Oral)   Resp 16   Ht 5' 11\" (1.803 m)   Wt (!) 324 lb 14.4 oz (147.4 kg)   SpO2 93%   BMI 45.31 kg/m²   General appearance: No apparent distress, appears stated age and cooperative. HEENT: Pupils equal, round, and reactive to light. Conjunctivae/corneas clear. Neck: Supple, no jugular venous distention. Trachea midline with full range of motion. Respiratory:  Normal respiratory effort. Clear to auscultation, bilaterally without Rales/Wheezes/Rhonchi. Cardiovascular: Regular rate and rhythm with normal S1/S2 without murmurs, rubs or gallops. Abdomen: Soft, non-tender, non-distended with normal bowel sounds. Musculoskelatal: No clubbing, cyanosis or edema bilaterally. Full range of motion without deformity. Neurologic:  Neurovascularly intact without any focal sensory/motor deficits. Cranial nerves: II-XII intact, grossly non-focal.  Psychiatric: Alert and oriented, thought content appropriate, normal insight  Skin: Skin color, texture, turgor normal.  No rashes or lesions. Capillary Refill: Brisk,< 3 seconds   Peripheral Pulses: +2 palpable, equal bilaterally       Patient Discharge Instructions: Follow up:  1. Primary Care Provider Paula Campos MD in the next 1-2 weeks.     Discharge Medications:   Discharge Medication List as of 10/13/2020  2:18 PM      START taking these medications    Details   dexamethasone (DECADRON) 6 MG tablet Take 1 tablet by mouth daily for 2 days, Disp-2 tablet,R-0Normal      amLODIPine (NORVASC) 10 MG tablet Take 1 tablet by mouth daily, Disp-30 tablet,R-0Normal           Discharge Medication List as of 10/13/2020  2:18 PM        Discharge Medication List as of 10/13/2020  2:18 PM      CONTINUE these medications which have NOT CHANGED    Details   Loratadine-Pseudoephedrine (CLARITIN-D 12 HOUR PO) Take by mouthHistorical Med           Discharge Medication List as of 10/13/2020  2:18 PM            Significant Test Results    Xr Chest Portable    Result Date: 10/5/2020  EXAMINATION: acute pulmonary embolus on this motion degraded study. Multifocal bilateral ground-glass opacities involving all lobes of both lungs with basilar consolidation and mild volume loss. Multifocal pneumonia with basilar atelectasis suspected, possibly an atypical or viral pneumonia. Imaging features can be seen with COVID-19 pneumonia, though are nonspecific and can occur with a variety of infectious and noninfectious processes. PneInd         Consults:     IP CONSULT TO HOSPITALIST  IP CONSULT TO INFECTIOUS DISEASES  IP CONSULT TO PHARMACY    Labs: For convenience and continuity at follow-up the following most recent labs are provided:    Lab Results   Component Value Date    WBC 8.9 10/12/2020    HGB 14.5 10/12/2020    HCT 43.1 10/12/2020    MCV 86.7 10/12/2020     10/12/2020     10/13/2020    K 3.8 10/13/2020    K 3.6 10/06/2020     10/13/2020    CO2 27 10/13/2020    BUN 21 10/13/2020    CREATININE 0.9 10/13/2020    CALCIUM 8.4 10/13/2020    PHOS 3.4 10/10/2020    TROPONINI <0.01 10/05/2020    ALKPHOS 54 10/13/2020    ALT 49 10/13/2020    AST 28 10/13/2020    BILITOT 0.6 10/13/2020    BILIDIR <0.2 06/04/2019    LABALBU 3.6 10/13/2020    LDLCALC 121 06/04/2019    TRIG 71 06/04/2019     Lab Results   Component Value Date    INR 1.14 10/05/2020         The patient was seen and examined on day of discharge and this discharge summary is in conjunction with any daily progress note from day of discharge. Time spent on discharge is more than 30 minutes in the examination, evaluation, counseling and review of medications and discharge plan. Signed:    Toyin Nam MD   11/4/2020    Thank you Lacie Cox MD for the opportunity to be involved in this patient's care. If you have any questions or concerns please feel free to contact my office [phone].

## 2020-11-06 ENCOUNTER — OFFICE VISIT (OUTPATIENT)
Dept: FAMILY MEDICINE CLINIC | Age: 50
End: 2020-11-06
Payer: COMMERCIAL

## 2020-11-06 VITALS
SYSTOLIC BLOOD PRESSURE: 132 MMHG | DIASTOLIC BLOOD PRESSURE: 84 MMHG | OXYGEN SATURATION: 99 % | HEIGHT: 71 IN | HEART RATE: 94 BPM | RESPIRATION RATE: 18 BRPM | TEMPERATURE: 98.3 F | WEIGHT: 315 LBS | BODY MASS INDEX: 44.1 KG/M2

## 2020-11-06 PROBLEM — Z86.16 HISTORY OF 2019 NOVEL CORONAVIRUS DISEASE (COVID-19): Status: ACTIVE | Noted: 2020-11-06

## 2020-11-06 PROCEDURE — 99396 PREV VISIT EST AGE 40-64: CPT | Performed by: INTERNAL MEDICINE

## 2020-11-06 RX ORDER — AMLODIPINE BESYLATE 10 MG/1
10 TABLET ORAL DAILY
Qty: 90 TABLET | Refills: 3 | Status: SHIPPED | OUTPATIENT
Start: 2020-11-06 | End: 2022-05-26 | Stop reason: SDUPTHER

## 2020-11-06 ASSESSMENT — ENCOUNTER SYMPTOMS
COUGH: 1
ALLERGIC/IMMUNOLOGIC NEGATIVE: 1
GASTROINTESTINAL NEGATIVE: 1
EYES NEGATIVE: 1

## 2020-11-06 NOTE — PATIENT INSTRUCTIONS
SSESSMENT/PLAN:  1. Annual physical exam  Eye exam and Shingrix vaccine next year. 2. Essential hypertension  Continue present meds. Home BP checks. Call if>140/90. Improve diet. Avoid caffeine and salt. Call if new c/o w/ meds. 3. Obstructive sleep apnea syndrome  Continue C-pap. Call if new c/o.     4. Pure hypercholesterolemia  Will do labs and adjust if needed. 5. Chronic migraine without aura without status migrainosus, not intractable  No recent c/o. To call if new c/o.     6. Morbid obesity due to excess calories ([facility] [address]  Must improve diet and lose some weight. Call if needs further assistance. 7. History of 2019 novel coronavirus disease (COVID-19)  Doing great. 72 oz water daily. Daily walk. Call if new c/o. RTSM 3 months. An  electronic signature was used to authenticate this note. --Blayne Scott MD on 11/6/2020 at 3:55 PM      Prostate: na.   Colonoscopy: 1/2020  DEXA: na  Eye: 5/2019. Hearing: passed in office exam  Immunization: up to date.   MMSE: na  Get Up and Go: na  Tob: nonsmoker/ quit 2011/ 4 pk yr  ETOH: 1-2 Drinks/week  Caffeine: moderate am  Cardiac Risk Assessment: labs pending  Living will: yes  Medical power of : no

## 2020-11-06 NOTE — ASSESSMENT & PLAN NOTE
Prostate: na.   Colonoscopy: 1/2020  DEXA: na  Eye: 5/2019. Hearing: passed in office exam  Immunization: up to date.   MMSE: na  Get Up and Go: na  Tob: nonsmoker/ quit 2011/ 4 pk yr  ETOH: 1-2 Drinks/week  Caffeine: moderate am  Cardiac Risk Assessment: labs pending  Living will: yes  Medical power of : no

## 2020-11-06 NOTE — PROGRESS NOTES
2020    Tita Ragland (:  1970) is a 53 y.o. male, here for evaluation of the following medical concerns:    HPI  Annual physical exam  Prostate: na.   Colonoscopy: 2020  DEXA: na  Eye: 2019. Hearing: passed in office exam  Immunization: up to date. MMSE: na  Get Up and Go: na  Tob: nonsmoker/ quit  pk yr  ETOH: 1-2 Drinks/week  Caffeine: moderate am  Cardiac Risk Assessment: labs pending  Living will: yes  Medical power of : no    Essential hypertension  No meds at present, no chest pain, SOB, palpatations, or syncope. Home bp rare. 120s/80s. Obstructive sleep apnea syndrome  Doing great w/ C-pap. No new c/o. Pure hypercholesterolemia  Has had high LDL and Total. Doesn't meet critieria for tx in past. Diet and wt unchanged. Chronic migraine without aura without status migrainosus, not intractable  No migraines since last visit. Morbid obesity due to excess calories (HCC)  No real change in diet and wt stable. History of 2019 novel coronavirus disease (COVID-19)  Was admitted w/ Pneumonia for ~10 days. Doing well w/ no c/o other than lingering cough. Review of Systems   Constitutional: Positive for fatigue. HENT: Negative. Eyes: Negative. Respiratory: Positive for cough. Cardiovascular: Negative. Gastrointestinal: Negative. Endocrine: Negative. Genitourinary: Negative. Musculoskeletal: Negative. Skin: Negative. Allergic/Immunologic: Negative. Neurological: Negative. Hematological: Negative. Psychiatric/Behavioral: Negative. Prior to Visit Medications    Medication Sig Taking?  Authorizing Provider   amLODIPine (NORVASC) 10 MG tablet Take 1 tablet by mouth daily Yes Holley Nieto MD   Loratadine-Pseudoephedrine (CLARITIN-D 12 HOUR PO) Take by mouth Yes Historical Provider, MD        No Known Allergies    Past Medical History:   Diagnosis Date    PNA (pneumonia) 10/5/2020    Severe obstructive sleep apnea        Past Surgical History:   Procedure Laterality Date    APPENDECTOMY      COLONOSCOPY N/A 2020    COLONOSCOPY W/ ANESTHESIA performed by Renetta Yates MD at [address] ARTHROSCOPY Left        Social History     Socioeconomic History    Marital status:      Spouse name: Not on file    Number of children: Not on file    Years of education: Not on file    Highest education level: Not on file   Occupational History    Not on file   Social Needs    Financial resource strain: Not on file    Food insecurity     Worry: Not on file     Inability: Not on file    Transportation needs     Medical: Not on file     Non-medical: Not on file   Tobacco Use    Smoking status: Former Smoker     Packs/day: 0.50     Years: 7.00     Pack years: 3.50     Types: Cigars, Cigarettes     Last attempt to quit: 2001     Years since quittin.8    Smokeless tobacco: Never Used    Tobacco comment: light cigar smoker/ 1 every month or so,   Substance and Sexual Activity    Alcohol use:  Yes     Alcohol/week: 1.0 - 2.0 standard drinks     Types: 1 - 2 Shots of liquor per week     Comment: occasional    Drug use: No    Sexual activity: Not on file   Lifestyle    Physical activity     Days per week: Not on file     Minutes per session: Not on file    Stress: Not on file   Relationships    Social connections     Talks on phone: Not on file     Gets together: Not on file     Attends Restorationism service: Not on file     Active member of club or organization: Not on file     Attends meetings of clubs or organizations: Not on file     Relationship status: Not on file    Intimate partner violence     Fear of current or ex partner: Not on file     Emotionally abused: Not on file     Physically abused: Not on file     Forced sexual activity: Not on file   Other Topics Concern    Not on file   Social History Narrative    Not on file        Family History   Problem Relation Age of Onset    Heart Disease Father     High Blood Pressure Paternal Aunt     High Blood Pressure Paternal Uncle     Cancer Mother         myeloma    Cancer Maternal Grandmother        Vitals:    11/06/20 1505   BP: 132/84   Pulse: 94   Resp: 18   Temp: 98.3 °F (36.8 °C)   SpO2: 99%   Weight: (!) 322 lb (146.1 kg)   Height: 5' 11\" (1.803 m)     Estimated body mass index is 44.91 kg/m² as calculated from the following:    Height as of this encounter: 5' 11\" (1.803 m). Weight as of this encounter: 322 lb (146.1 kg). Physical Exam  Constitutional:       General: He is not in acute distress. Appearance: Normal appearance. He is well-developed. He is obese. He is not ill-appearing, toxic-appearing or diaphoretic. HENT:      Head: Normocephalic and atraumatic. Right Ear: Hearing, tympanic membrane, ear canal and external ear normal.      Left Ear: Hearing, tympanic membrane, ear canal and external ear normal.      Nose: Nose normal.      Right Sinus: No maxillary sinus tenderness or frontal sinus tenderness. Left Sinus: No maxillary sinus tenderness or frontal sinus tenderness. Mouth/Throat:      Pharynx: Uvula midline. No oropharyngeal exudate. Eyes:      General: Lids are normal. No scleral icterus. Right eye: No discharge. Left eye: No discharge. Conjunctiva/sclera: Conjunctivae normal.      Pupils: Pupils are equal, round, and reactive to light. Funduscopic exam:     Right eye: No hemorrhage, exudate, AV nicking, arteriolar narrowing or papilledema. Left eye: No hemorrhage, exudate, AV nicking, arteriolar narrowing or papilledema. Neck:      Musculoskeletal: Full passive range of motion without pain, normal range of motion and neck supple. No neck rigidity or muscular tenderness. Thyroid: No thyroid mass or thyromegaly. Vascular: Normal carotid pulses. No carotid bruit, hepatojugular reflux or JVD. Trachea: Trachea normal. No tracheal deviation. Cardiovascular:      Rate and Rhythm: Normal rate and regular rhythm. No extrasystoles are present. Chest Wall: PMI is not displaced. Pulses: Normal pulses. No decreased pulses. Carotid pulses are 2+ on the right side and 2+ on the left side. Radial pulses are 2+ on the right side and 2+ on the left side. Femoral pulses are 2+ on the right side and 2+ on the left side. Popliteal pulses are 2+ on the right side and 2+ on the left side. Dorsalis pedis pulses are 2+ on the right side and 2+ on the left side. Posterior tibial pulses are 2+ on the right side and 2+ on the left side. Heart sounds: Normal heart sounds. No murmur. No friction rub. No gallop. Pulmonary:      Effort: Pulmonary effort is normal. No tachypnea, bradypnea, accessory muscle usage or respiratory distress. Breath sounds: Normal breath sounds. No stridor. No decreased breath sounds, wheezing, rhonchi or rales. Chest:      Chest wall: No tenderness. Abdominal:      General: Bowel sounds are normal. There is no distension or abdominal bruit. Palpations: Abdomen is soft. There is no shifting dullness, fluid wave, mass or pulsatile mass. Tenderness: There is no abdominal tenderness. There is no guarding or rebound. Hernia: No hernia is present. There is no hernia in the ventral area or left inguinal area. Genitourinary:     Penis: No tenderness. Scrotum/Testes: Cremasteric reflex is present. Comments: NE recent colonoscopy  Musculoskeletal: Normal range of motion. General: No swelling, tenderness, deformity or signs of injury. Right lower leg: No edema. Left lower leg: No edema. Lymphadenopathy:      Head:      Right side of head: No submental, submandibular, tonsillar, preauricular, posterior auricular or occipital adenopathy.       Left side of head: No submental, submandibular, tonsillar, preauricular, posterior auricular or occipital adenopathy. Cervical: No cervical adenopathy. Upper Body:      Right upper body: No supraclavicular or epitrochlear adenopathy. Left upper body: No supraclavicular or epitrochlear adenopathy. Skin:     General: Skin is warm and dry. Capillary Refill: Capillary refill takes less than 2 seconds. Coloration: Skin is not jaundiced or pale. Findings: No abrasion, bruising, erythema, lesion or rash. Nails: There is no clubbing. Neurological:      General: No focal deficit present. Mental Status: He is alert and oriented to person, place, and time. Mental status is at baseline. He is not disoriented. Cranial Nerves: No cranial nerve deficit. Sensory: No sensory deficit. Motor: No weakness, tremor, atrophy, abnormal muscle tone or seizure activity. Coordination: Coordination normal.      Gait: Gait normal.      Deep Tendon Reflexes: Reflexes are normal and symmetric. Reflexes normal. Babinski sign absent on the right side. Babinski sign absent on the left side. Reflex Scores:       Tricep reflexes are 2+ on the right side and 2+ on the left side. Bicep reflexes are 2+ on the right side and 2+ on the left side. Brachioradialis reflexes are 2+ on the right side and 2+ on the left side. Patellar reflexes are 2+ on the right side and 2+ on the left side. Achilles reflexes are 2+ on the right side and 2+ on the left side. Psychiatric:         Mood and Affect: Mood normal.         Speech: Speech normal.         Behavior: Behavior normal.         Thought Content: Thought content normal.         Judgment: Judgment normal.         ASSESSMENT/PLAN:  1. Annual physical exam  Eye exam and Shingrix vaccine next year. 2. Essential hypertension  Continue present meds. Home BP checks. Call if>140/90. Improve diet. Avoid caffeine and salt. Call if new c/o w/ meds. 3. Obstructive sleep apnea syndrome  Continue C-pap.  Call if new c/o.     4. Pure hypercholesterolemia  Will do labs and adjust if needed. 5. Chronic migraine without aura without status migrainosus, not intractable  No recent c/o. To call if new c/o.     6. Morbid obesity due to excess calories ([address]  Must improve diet and lose some weight. Call if needs further assistance. 7. History of 2019 novel coronavirus disease (COVID-19)  Doing great. 72 oz water daily. Daily walk. Call if new c/o. RTSM 3 months. An  electronic signature was used to authenticate this note.     --Gabino Cardoso MD on 11/6/2020 at 3:55 PM

## 2020-11-10 ENCOUNTER — OFFICE VISIT (OUTPATIENT)
Dept: PRIMARY CARE CLINIC | Age: 50
End: 2020-11-10
Payer: COMMERCIAL

## 2020-11-10 PROCEDURE — 99211 OFF/OP EST MAY X REQ PHY/QHP: CPT | Performed by: NURSE PRACTITIONER

## 2020-11-10 NOTE — PATIENT INSTRUCTIONS
Advance Care Planning  People with COVID-19 may have no symptoms, mild symptoms, such as fever, cough, and shortness of breath or they may have more severe illness, developing severe and fatal pneumonia. As a result, Advance Care Planning with attention to naming a health care decision maker (someone you trust to make healthcare decisions for you if you could not speak for yourself) and sharing other health care preferences is important BEFORE a possible health crisis. Please contact your Primary Care Provider to discuss Advance Care Planning. Preventing the Spread of Coronavirus Disease 2019 in Homes and Residential Communities  For the most recent information go to [email or web address]    Prevention steps for People with confirmed or suspected COVID-19 (including persons under investigation) who do not need to be hospitalized  and   People with confirmed COVID-19 who were hospitalized and determined to be medically stable to go home    Your healthcare provider and public health staff will evaluate whether you can be cared for at home. If it is determined that you do not need to be hospitalized and can be isolated at home, you will be monitored by staff from your local or state health department. You should follow the prevention steps below until a healthcare provider or local or state health department says you can return to your normal activities. Stay home except to get medical care  People who are mildly ill with COVID-19 are able to isolate at home during their illness. You should restrict activities outside your home, except for getting medical care. Do not go to work, school, or public areas. Avoid using public transportation, ride-sharing, or taxis. Separate yourself from other people and animals in your home  People: As much as possible, you should stay in a specific room and away from other people in your home.  Also, you should use a separate bathroom, if available. Animals: You should restrict contact with pets and other animals while you are sick with COVID-19, just like you would around other people. Although there have not been reports of pets or other animals becoming sick with COVID-19, it is still recommended that people sick with COVID-19 limit contact with animals until more information is known about the virus. When possible, have another member of your household care for your animals while you are sick. If you are sick with COVID-19, avoid contact with your pet, including petting, snuggling, being kissed or licked, and sharing food. If you must care for your pet or be around animals while you are sick, wash your hands before and after you interact with pets and wear a facemask. Call ahead before visiting your doctor  If you have a medical appointment, call the healthcare provider and tell them that you have or may have COVID-19. This will help the healthcare providers office take steps to keep other people from getting infected or exposed. Wear a facemask  You should wear a facemask when you are around other people (e.g., sharing a room or vehicle) or pets and before you enter a healthcare providers office. If you are not able to wear a facemask (for example, because it causes trouble breathing), then people who live with you should not stay in the same room with you, or they should wear a facemask if they enter your room. Cover your coughs and sneezes  Cover your mouth and nose with a tissue when you cough or sneeze. Throw used tissues in a lined trash can. Immediately wash your hands with soap and water for at least 20 seconds or, if soap and water are not available, clean your hands with an alcohol-based hand  that contains at least 60% alcohol.   Clean your hands often  Wash your hands often with soap and water for at least 20 seconds, especially after blowing your nose, coughing, or sneezing; going to the bathroom; and have a medical emergency and need to call 911, notify the dispatch personnel that you have, or are being evaluated for COVID-19. If possible, put on a facemask before emergency medical services arrive. Discontinuing home isolation  Patients with confirmed COVID-19 should remain under home isolation precautions until the risk of secondary transmission to others is thought to be low. The decision to discontinue home isolation precautions should be made on a case-by-case basis, in consultation with healthcare providers and state and local health departments.

## 2020-11-10 NOTE — PROGRESS NOTES
Shelby Chiu received a viral test for COVID-19. They were educated on isolation and quarantine as appropriate. For any symptoms, they were directed to seek care from their PCP, given contact information to establish with a doctor, directed to an urgent care or the emergency room.

## 2020-11-11 ENCOUNTER — NURSE ONLY (OUTPATIENT)
Dept: FAMILY MEDICINE CLINIC | Age: 50
End: 2020-11-11
Payer: COMMERCIAL

## 2020-11-11 LAB
ALBUMIN SERPL-MCNC: 4.3 G/DL (ref 3.4–5)
ALP BLD-CCNC: 83 U/L (ref 40–129)
ALT SERPL-CCNC: 30 U/L (ref 10–40)
ANION GAP SERPL CALCULATED.3IONS-SCNC: 11 MMOL/L (ref 3–16)
AST SERPL-CCNC: 15 U/L (ref 15–37)
BILIRUB SERPL-MCNC: 0.6 MG/DL (ref 0–1)
BILIRUBIN DIRECT: <0.2 MG/DL (ref 0–0.3)
BILIRUBIN, INDIRECT: NORMAL MG/DL (ref 0–1)
BUN BLDV-MCNC: 13 MG/DL (ref 7–20)
CALCIUM SERPL-MCNC: 9.4 MG/DL (ref 8.3–10.6)
CHLORIDE BLD-SCNC: 106 MMOL/L (ref 99–110)
CHOLESTEROL, TOTAL: 205 MG/DL (ref 0–199)
CO2: 27 MMOL/L (ref 21–32)
CREAT SERPL-MCNC: 0.8 MG/DL (ref 0.9–1.3)
GFR AFRICAN AMERICAN: >60
GFR NON-AFRICAN AMERICAN: >60
GLUCOSE BLD-MCNC: 109 MG/DL (ref 70–99)
HCT VFR BLD CALC: 37.8 % (ref 40.5–52.5)
HDLC SERPL-MCNC: 43 MG/DL (ref 40–60)
HEMOGLOBIN: 12.8 G/DL (ref 13.5–17.5)
LDL CHOLESTEROL CALCULATED: 148 MG/DL
MCH RBC QN AUTO: 29.4 PG (ref 26–34)
MCHC RBC AUTO-ENTMCNC: 33.9 G/DL (ref 31–36)
MCV RBC AUTO: 86.7 FL (ref 80–100)
PDW BLD-RTO: 14.8 % (ref 12.4–15.4)
PLATELET # BLD: 296 K/UL (ref 135–450)
PMV BLD AUTO: 8.9 FL (ref 5–10.5)
POTASSIUM SERPL-SCNC: 4.2 MMOL/L (ref 3.5–5.1)
PROSTATE SPECIFIC ANTIGEN: 0.34 NG/ML (ref 0–4)
RBC # BLD: 4.35 M/UL (ref 4.2–5.9)
SODIUM BLD-SCNC: 144 MMOL/L (ref 136–145)
TOTAL PROTEIN: 6.9 G/DL (ref 6.4–8.2)
TRIGL SERPL-MCNC: 72 MG/DL (ref 0–150)
VLDLC SERPL CALC-MCNC: 14 MG/DL
WBC # BLD: 5.1 K/UL (ref 4–11)

## 2020-11-11 PROCEDURE — 36415 COLL VENOUS BLD VENIPUNCTURE: CPT | Performed by: INTERNAL MEDICINE

## 2020-11-13 ENCOUNTER — TELEPHONE (OUTPATIENT)
Dept: FAMILY MEDICINE CLINIC | Age: 50
End: 2020-11-13

## 2020-11-13 LAB — SARS-COV-2, NAA: NOT DETECTED

## 2020-11-13 NOTE — TELEPHONE ENCOUNTER
Patient returned call regarding lab results. I read the physician notes, patient understood, no questions. Scheduled for repeat CBC on 12/2, please place orders.

## 2020-12-02 ENCOUNTER — NURSE ONLY (OUTPATIENT)
Dept: FAMILY MEDICINE CLINIC | Age: 50
End: 2020-12-02
Payer: COMMERCIAL

## 2020-12-02 LAB
HCT VFR BLD CALC: 39.4 % (ref 40.5–52.5)
HEMOGLOBIN: 13 G/DL (ref 13.5–17.5)
MCH RBC QN AUTO: 29.1 PG (ref 26–34)
MCHC RBC AUTO-ENTMCNC: 32.8 G/DL (ref 31–36)
MCV RBC AUTO: 88.5 FL (ref 80–100)
PDW BLD-RTO: 15.5 % (ref 12.4–15.4)
PLATELET # BLD: 243 K/UL (ref 135–450)
PMV BLD AUTO: 9.2 FL (ref 5–10.5)
RBC # BLD: 4.46 M/UL (ref 4.2–5.9)
WBC # BLD: 4.7 K/UL (ref 4–11)

## 2020-12-02 PROCEDURE — 36415 COLL VENOUS BLD VENIPUNCTURE: CPT | Performed by: INTERNAL MEDICINE

## 2020-12-03 ENCOUNTER — TELEPHONE (OUTPATIENT)
Dept: FAMILY MEDICINE CLINIC | Age: 50
End: 2020-12-03

## 2020-12-03 NOTE — TELEPHONE ENCOUNTER
Patient returned call about test results. Shannon Thompson wants to know what Dr. Cyndy Mederos by \"blood loss\" from his test results dated 12/2/20.

## 2021-05-03 ENCOUNTER — TELEPHONE (OUTPATIENT)
Dept: PULMONOLOGY | Age: 51
End: 2021-05-03

## 2022-01-14 RX ORDER — AMLODIPINE BESYLATE 10 MG/1
10 TABLET ORAL DAILY
Qty: 90 TABLET | Refills: 3 | OUTPATIENT
Start: 2022-01-14 | End: 2022-02-13

## 2022-01-26 NOTE — TELEPHONE ENCOUNTER
Pt was advised that medication was refused and would not be able to be refilled due to not being seen in the office for over a year, gave him the find a provider line.

## 2022-01-31 ENCOUNTER — OFFICE VISIT (OUTPATIENT)
Dept: PRIMARY CARE CLINIC | Age: 52
End: 2022-01-31
Payer: COMMERCIAL

## 2022-01-31 VITALS
DIASTOLIC BLOOD PRESSURE: 86 MMHG | TEMPERATURE: 98.2 F | HEART RATE: 87 BPM | OXYGEN SATURATION: 98 % | BODY MASS INDEX: 46.65 KG/M2 | WEIGHT: 315 LBS | HEIGHT: 69 IN | SYSTOLIC BLOOD PRESSURE: 134 MMHG

## 2022-01-31 DIAGNOSIS — Z00.00 ANNUAL PHYSICAL EXAM: ICD-10-CM

## 2022-01-31 DIAGNOSIS — E66.01 CLASS 3 SEVERE OBESITY DUE TO EXCESS CALORIES WITHOUT SERIOUS COMORBIDITY WITH BODY MASS INDEX (BMI) OF 45.0 TO 49.9 IN ADULT (HCC): ICD-10-CM

## 2022-01-31 DIAGNOSIS — G47.33 OBSTRUCTIVE SLEEP APNEA SYNDROME: ICD-10-CM

## 2022-01-31 DIAGNOSIS — R39.9 LOWER URINARY TRACT SYMPTOMS (LUTS): Primary | ICD-10-CM

## 2022-01-31 DIAGNOSIS — Z87.891 FORMER CIGARETTE SMOKER: ICD-10-CM

## 2022-01-31 DIAGNOSIS — Z12.5 SCREENING FOR PROSTATE CANCER: ICD-10-CM

## 2022-01-31 DIAGNOSIS — I10 ESSENTIAL HYPERTENSION: ICD-10-CM

## 2022-01-31 DIAGNOSIS — Z82.49 FAMILY HISTORY OF HEART DISEASE: ICD-10-CM

## 2022-01-31 PROCEDURE — 99386 PREV VISIT NEW AGE 40-64: CPT | Performed by: FAMILY MEDICINE

## 2022-01-31 PROCEDURE — 99203 OFFICE O/P NEW LOW 30 MIN: CPT | Performed by: FAMILY MEDICINE

## 2022-01-31 RX ORDER — ASPIRIN 81 MG/1
81 TABLET ORAL DAILY
Qty: 30 TABLET | Refills: 0 | COMMUNITY
Start: 2022-01-31

## 2022-01-31 ASSESSMENT — ENCOUNTER SYMPTOMS
EYE PAIN: 0
WHEEZING: 0
COUGH: 0
VOMITING: 0
SORE THROAT: 0
ABDOMINAL PAIN: 0
CONSTIPATION: 0
SHORTNESS OF BREATH: 0
DIARRHEA: 0
EYE ITCHING: 0
NAUSEA: 0

## 2022-01-31 NOTE — PROGRESS NOTES
[address] [facility] [address] PRIMARY CARE  [address] [place] [id number]  Dept: [phone]  Dept Fax: [phone]     2022      Leonela Huizar   1970     Chief Complaint   Patient presents with    New Patient     requests Physical       HPI  Pt comes in today as a NP to establish care. Chart review and discussion had today. He reportedly had a severe COVID illness in  - required high flow oxygen and hospitalization for 7+ days. He has remained compliant in taking his daily BP medication. He is wanting to complete wellness, but in addition to that has concerns regarding:    URINE: Having poor urine stream and urgency. Feels like not emptying bladder all. Noticing in last 1 year. Not improved, seems worsening. No formal prostate eval except a normal PSA level last time checked with previous PCP. BPH score of 28 (severe symptoms) today, see scanned. PHQ Scores 2022 1/10/2020 2019 2017   PHQ2 Score 0 0 0 0   PHQ9 Score 0 0 0 0     Interpretation of Total Score Depression Severity: 1-4 = Minimal depression, 5-9 = Mild depression, 10-14 = Moderate depression, 15-19 = Moderately severe depression, 20-27 = Severe depression     Prior to Visit Medications    Medication Sig Taking?  Authorizing Provider   Cholecalciferol (VITAMIN D3) 125 MCG (5000 UT) TABS Take by mouth Yes Historical Provider, MD   zinc 50 MG CAPS Take by mouth Yes Historical Provider, MD   amLODIPine (NORVASC) 10 MG tablet Take 1 tablet by mouth daily Yes Justice Vasquez MD       Past Medical History:   Diagnosis Date    Essential hypertension     History of moderate to severe 2019 novel coronavirus disease (COVID-19) - 2020    Severe obstructive sleep apnea         Social History     Tobacco Use    Smoking status: Former Smoker     Packs/day: 0.50     Years: 7.00     Pack years: 3.50     Types: Cigars, Cigarettes     Quit date: 2001     Years since quittin.0    Smokeless tobacco: Never Used    Tobacco comment: light cigar smoker/ 1 every month or so,   Vaping Use    Vaping Use: Never used   Substance Use Topics    Alcohol use: Yes     Alcohol/week: 1.0 - 2.0 standard drink     Types: 1 - 2 Shots of liquor per week     Comment: occasional    Drug use: No        Past Surgical History:   Procedure Laterality Date    APPENDECTOMY      COLONOSCOPY N/A 1/20/2020    COLONOSCOPY W/ ANESTHESIA performed by Silverio Pepper MD at [address] ARTHROSCOPY Left         No Known Allergies     Family History   Problem Relation Age of Onset    Heart Disease Father     Heart Attack Father 54    High Blood Pressure Father     Cancer Mother         Multiple Myeloma    Cancer Maternal Grandmother         Lung    Other Brother         RUBI    Other Brother         RUBI        Patient's past medical history, surgical history, family history, medications, and allergies  were all reviewed and updated as appropriate today. Review of Systems   Constitutional: Negative for fatigue, fever and unexpected weight change. HENT: Negative for congestion, ear pain and sore throat. Eyes: Negative for pain, itching and visual disturbance. Respiratory: Negative for cough, shortness of breath and wheezing. Cardiovascular: Negative for chest pain, palpitations and leg swelling. Gastrointestinal: Negative for abdominal pain, constipation, diarrhea, nausea and vomiting. Endocrine: Negative for cold intolerance, heat intolerance, polydipsia and polyuria. Genitourinary: Positive for difficulty urinating and frequency. Negative for dysuria and hematuria. Urgency, trouble emptying bladder fully   Musculoskeletal: Negative for arthralgias and joint swelling. Skin: Negative for rash. Neurological: Negative for dizziness and headaches.        /86   Pulse 87   Temp 98.2 °F (36.8 °C)   Ht 5' 9\" (1.753 m)   Wt (!) 327 lb (148.3 kg)   SpO2 98%   BMI 48.29 kg/m²      Physical Exam  Vitals reviewed. Constitutional:       General: He is not in acute distress. Appearance: Normal appearance. He is well-developed. He is obese. HENT:      Head: Normocephalic and atraumatic. Right Ear: Tympanic membrane and ear canal normal. No drainage. No middle ear effusion. Tympanic membrane is not erythematous. Left Ear: Tympanic membrane and ear canal normal. No drainage. No middle ear effusion. Tympanic membrane is not erythematous. Nose: Nose normal. No rhinorrhea. Mouth/Throat:      Mouth: Mucous membranes are moist.      Pharynx: No oropharyngeal exudate or posterior oropharyngeal erythema. Eyes:      Extraocular Movements: Extraocular movements intact. Pupils: Pupils are equal, round, and reactive to light. Neck:      Thyroid: No thyromegaly. Cardiovascular:      Rate and Rhythm: Normal rate and regular rhythm. Heart sounds: No murmur heard. Pulmonary:      Effort: Pulmonary effort is normal.      Breath sounds: Normal breath sounds. No wheezing. Abdominal:      General: Bowel sounds are normal.      Palpations: Abdomen is soft. There is no mass. Tenderness: There is no abdominal tenderness. Genitourinary:     Comments: deferred  Musculoskeletal:         General: No swelling. Normal range of motion. Cervical back: Neck supple. Lymphadenopathy:      Cervical: No cervical adenopathy. Skin:     General: Skin is warm and dry. Findings: No rash. Neurological:      General: No focal deficit present. Mental Status: He is alert and oriented to person, place, and time. Cranial Nerves: No cranial nerve deficit. Psychiatric:         Mood and Affect: Mood normal.         Assessment:  Encounter Diagnoses   Name Primary?     Lower urinary tract symptoms (LUTS) - suspected BPH, first discussed 1/2022 Yes    Essential hypertension     Obstructive sleep apnea syndrome with CPAP     Annual physical exam     Class 3 severe obesity due to excess calories without serious comorbidity with body mass index (BMI) of 45.0 to 49.9 in adult [facility])     Screening for prostate cancer     Former cigarette smoker     Family history of heart disease        Plan:  1. Lower urinary tract symptoms (LUTS) - suspected BPH, first discussed 1/2022  New pt to practice with lengthy discussion today what I suspect is BPH with LUTS by clinical hx. AUA BPH scoring scale completed, see scanned documents. Discussed in detail with pt what this means and common course with age. Discussed treatment options and risks. At this time offered monitoring VS medication management with Flomax. If medication started counseled on side effects. Ensured PSA UTD. Given precautions and answered all questions. Check UA now to R/O UTI.  - Urinalysis with Microscopic; Future    2. Essential hypertension  Chronic, borderline to mild level of control. Encourage lifestyle changes. 3. Obstructive sleep apnea syndrome with CPAP  Chronic, continue CPAP. 4. Annual physical exam  General wellness exam. Reviewed chart for past hx and updated today. Counseled on age appropriate health guidance and discussed screening recommendations. Vaccinations reviewed and discussed. All questions answered  - Hemoglobin A1C; Future  - CBC Auto Differential; Future  - Comprehensive Metabolic Panel, Fasting; Future  - Lipid, Fasting; Future    5. Class 3 severe obesity due to excess calories without serious comorbidity with body mass index (BMI) of 45.0 to 49.9 in adult [facility])  Patient was asked about current diet and exercise habits, and personalized advice was provided regarding recommended lifestyle changes. Patient's comorbid health conditions associated with elevated BMI were discussed, as well as the likely benefits weight loss.   Based upon patient's motivation to change behavior, the following plan was agreed upon to work toward a weight loss goal - increasing CV activity, reducing carbs/calories and healthy eating habits. Educational materials for weight loss were provided. Patient will follow-up with myself at designated time in future. - Hemoglobin A1C; Future    6. Screening for prostate cancer  Discussed prostate cancer screening with male of appropriate age and risk factors. I have provided evidence behind PSA and answered all questions regarding the sensitivity of this test.  At this time, through shared decision making, patient would like to move forward with collection of annual PSA.  - PSA screening; Future    7. Former cigarette smoker  - Urinalysis with Microscopic; Future    8. Family history of heart disease  - Hemoglobin A1C; Future      Return if symptoms worsen or fail to improve. Barron Pagan, DO     Please note that this chart was generated using dragon dictation software. Although every effort was made to ensure the accuracy of this automated transcription, some errors in transcription may have occurred.

## 2022-01-31 NOTE — PATIENT INSTRUCTIONS
liver damage and stomach bleeding. · If you are pregnant, are breastfeeding, or plan to become pregnant, talk to your doctor about what medicines are safe. · Talk with your doctor before you take a pain medicine. Many pain medicines have aspirin. Too much aspirin can be harmful. · Wear medical alert jewelry. This lets others know that you take an antiplatelet. You can buy it at most drugstores. · Try to avoid injuries that might make you bleed. For example, be careful when you exercise and when you play sports. Make your home safe to reduce your risk of falling. When should you call for help? Call 911  anytime you think you may need emergency care. For example, call if:    · You have a sudden, severe headache that is different from past headaches. Call your doctor now or seek immediate medical care if:    · You have any abnormal bleeding, such as:  ? A nosebleed that you can't easily stop. ? Bloody or black stools, or rectal bleeding. ? Bloody or pink urine.     · You feel dizzy or lightheaded or feel like you may faint. Watch closely for changes in your health, and be sure to contact your doctor if you have any problems. Where can you learn more? Go to https://OnDeckpeAvanzit.Parkmobile. org and sign in to your MailTime account. Enter Z491 in the GenePeeks box to learn more about \"Taking Aspirin and Other Antiplatelets Safely: Care Instructions. \"     If you do not have an account, please click on the \"Sign Up Now\" link. Current as of: April 29, 2021               Content Version: 13.1  © 2006-2021 Healthwise, Incorporated. Care instructions adapted under license by [facility] ([facility]). If you have questions about a medical condition or this instruction, always ask your healthcare professional. [place] [address] any warranty or liability for your use of this information.          Patient Education        Benign Prostatic Hyperplasia: Care Instructions  Your Care Instructions     Benign prostatic hyperplasia, or BPH, is an enlarged prostate gland. The prostate is a small gland that makes some of the fluid in semen. Prostate enlargement happens to almost all men as they age. It is usually not serious. BPH does not cause prostate cancer. As the prostate gets bigger, it may partly block the flow of urine. You may have a hard time getting a urine stream started or completely stopped. You may have a weak urine stream, or you may have to urinate more often than you used to, especially at night. Most men find these problems easy to manage. You do not need treatment unless your symptoms bother you a lot or you have other problems, such as bladder infections or stones. In these cases, medicines may help. Surgery is not needed unless the urine flow is blocked or the symptoms do not get better with medicine. Follow-up care is a key part of your treatment and safety. Be sure to make and go to all appointments, and call your doctor if you are having problems. It's also a good idea to know your test results and keep a list of the medicines you take. How can you care for yourself at home? · Urinate as much as you can, relax for a few moments, and then try to urinate again. · Sit on the toilet to urinate. · Avoid caffeine and alcohol. These drinks will increase how often you need to urinate. · Many over-the-counter cold and allergy medicines can make the symptoms of BPH worse. Avoid antihistamines, decongestants, and allergy pills, if you can. Read the warnings on the package. · If you take any prescription medicines such as muscle relaxants, pain medicines, or medicines for depression or anxiety, ask your doctor or pharmacist if they can cause urination problems. When should you call for help? Call your doctor now or seek immediate medical care if:    · You cannot urinate at all.     · You have symptoms of a urinary infection. For example:  ?  You have blood or pus in your drugs. Tamsulosin can affect your pupils. If you have cataract surgery, tell your surgeon ahead of time that you use this medicine. Tamsulosin is not for use in women, and the effects of this medicine during pregnancy or in breastfeeding women are unknown. How should I take tamsulosin? Your doctor may test your prostate specific antigen (PSA) to check for prostate cancer before you take tamsulosin. Follow all directions on your prescription label and read all medication guides or instruction sheets. Your doctor may occasionally change your dose. Use the medicine exactly as directed. Tamsulosin is usually taken once a day, approximately 30 minutes after the same meal each day. Swallow the capsule whole and do not crush, chew, break, or open it. Your blood pressure will need to be checked often. Some things can cause your blood pressure to get too low. This includes vomiting, diarrhea, or heavy sweating. Call your doctor if you are sick with vomiting or diarrhea. Store at room temperature away from moisture and heat. If you stop taking tamsulosin for any reason, call your doctor before you start taking it again. You may need a dose adjustment. What happens if I miss a dose? Take the medicine as soon as you can, but skip the missed dose if it is almost time for your next dose. Do not take two doses at one time. If you miss your doses for several days in a row, talk with your doctor before restarting the medication. What happens if I overdose? Seek emergency medical attention or call the Poison Help line at 1-601.184.9261. What should I avoid while taking tamsulosin? Avoid driving or hazardous activity until you know how this medicine will affect you. Your reactions could be impaired. Avoid getting up too fast from a sitting or lying position, or you may feel dizzy. What are the possible side effects of tamsulosin?   Get emergency medical help if you have signs of an allergic reaction (hives, difficult breathing, swelling in your face or throat) or a severe skin reaction (fever, sore throat, burning eyes, skin pain, red or purple skin rash with blistering and peeling). Stop using tamsulosin and call your doctor at once if you have:  · a light-headed feeling, like you might pass out; or  · penis erection that is painful or lasts 4 hours or longer. Tamsulosin lowers blood pressure and may cause dizziness or fainting, especially when you first start taking it. You may feel very dizzy when you first wake up. Avoid getting up too fast from a sitting or lying position, or you may feel dizzy. Common side effects may include:  · abnormal ejaculation, decreased amount of semen;  · dizziness, drowsiness, weakness;  · runny nose, cough;  · back pain, chest pain;  · nausea, diarrhea;  · tooth problems;  · blurred vision;  · sleep problems (insomnia); or  · decreased interest in sex. This is not a complete list of side effects and others may occur. Call your doctor for medical advice about side effects. You may report side effects to FDA at 8-892-FDA-4036. What other drugs will affect tamsulosin? Tell your doctor about all your current medicines. Many drugs can increase your risk of very low blood pressure while taking tamsulosin, especially:  · medicines similar to tamsulosin (alfuzosin, doxazosin, prazosin, silodosin, or terazosin);  · heart or blood pressure medication; or  · sildenafil (Viagra) and other erectile dysfunction medicines. This list is not complete and many other drugs may affect tamsulosin. This includes prescription and over-the-counter medicines, vitamins, and herbal products. Not all possible drug interactions are listed here. Where can I get more information? Your pharmacist can provide more information about tamsulosin. Remember, keep this and all other medicines out of the reach of children, never share your medicines with others, and use this medication only for the indication prescribed. Every effort has been made to ensure that the information provided by [address] is accurate, up-to-date, and complete, but no guarantee is made to that effect. Drug information contained herein may be time sensitive. [facility] information has been compiled for use by healthcare practitioners and consumers in the [place] and therefore [facility] does not warrant that uses outside of the [place] are appropriate, unless specifically indicated otherwise. [facility]'s drug information does not endorse drugs, diagnose patients or recommend therapy. [facility]'s drug information is an informational resource designed to assist licensed healthcare practitioners in caring for their patients and/or to serve consumers viewing this service as a supplement to, and not a substitute for, the expertise, skill, knowledge and judgment of healthcare practitioners. The absence of a warning for a given drug or drug combination in no way should be construed to indicate that the drug or drug combination is safe, effective or appropriate for any given patient. [facility] does not assume any responsibility for any aspect of healthcare administered with the aid of information [facility] provides. The information contained herein is not intended to cover all possible uses, directions, precautions, warnings, drug interactions, allergic reactions, or adverse effects. If you have questions about the drugs you are taking, check with your doctor, nurse or pharmacist.  Copyright 1932-1991 [address]: 9.02. Revision date: 5/16/2019. Care instructions adapted under license by [facility] ([facility]). If you have questions about a medical condition or this instruction, always ask your healthcare professional. [place] [address] any warranty or liability for your use of this information.

## 2022-02-18 NOTE — PROGRESS NOTES
Hospitalist Progress Note  10/11/2020 11:14 AM  Subjective:   Admit Date: 10/5/2020  PCP: Warden Tapan MD Status: Inpatient [101]  Interval History: Hospital Day: 7, decreasing oxygen requirement down to 2.5 LPM.  He has CPAP from home, which he started in March. History of present illness:  Admitted with acute hypoxic respiratory failure secondary to COVID-19 pneumonia, treated with remdesivir and dexamethasone on Droplet plus contact isolation. Oxygen requirement at 3 LPM.  Less cough. COVID-19 positive (10/3). Confirmed 10/5. Wife and patient tsted positive from outside lab and confirmed w/ onset of sxs on Monday, Sept 28. He is working from home. Several home projects for which he has been going to [employer]. DIET GENERAL; Left antecubital peripheral IV (10/5, day #6)  Medications:     remdesivir   100 mg Intravenous Q24H (10/9, day #3 of 5)   enoxaparin  40 mg Subcutaneous BID   amLODIPine  10 mg Oral Daily   dexamethasone  6 mg Oral Daily (10/5, day #7)     Recent Labs     10/09/20  0531 10/10/20  0534 10/11/20  0535   WBC 6.1 7.2 8.1   HGB 14.0 14.0 14.2    363 353   MCV 86.0 86.5 86.9     Recent Labs     10/09/20  0531 10/10/20  0534 10/11/20  0535    140 140   K 4.0 3.6 3.6    102 104   CO2 27 26 27   BUN 13 16 21*   CREATININE 0.8* 0.9 1.0   GLUCOSE 106* 97 102*     Recent Labs     10/10/20  0534 10/11/20  0535   AST 15 17   ALT 24 25   BILITOT 0.6 0.6   ALKPHOS 57 56     Procalcitonin (10/10) 0.13 ng/mL  D-dimer (10/10) < 200 ng/mL  Legionella and Strep Pneumoniae antigen (10/6) negative  CRP (10/10) 32.9 mg/L  Ferritin (10/10) 1,077 ng/mL     SARS-CoV-2, GUERLINE (10/5) Detected    CTA Chest (10/5) No gross evidence of an acute pulmonary embolus on this motion degraded study.   Multifocal bilateral ground-glass opacities involving all lobes of both lungs with basilar consolidation and mild volume loss.  Multifocal pneumonia with  basilar atelectasis suspected, possibly an atypical or viral pneumonia. Objective:   Vitals:  /88   Pulse 81   Temp 98.1 °F (oral)   Resp 16   Ht 5' 11\"  Wt 147.4 kg  SpO2 95% on 3.5 LPM  BMI 45.31 kg/m²   General appearance: alert and cooperative with exam  Lungs: diffuse rhonchi, reasonable overall air movement  Heart: regular rate and rhythm, S1, S2 normal, no murmur, click, rub or gallop  Abdomen: soft, non-tender; bowel sounds normal; no masses,  no organomegaly  Extremities: extremities normal, atraumatic, no cyanosis or edema  Neurologic: No obvious focal neurologic deficits. Assessment and Plan:   1. Acute hypoxic respiratory failure secondary to COVID-19 pneumonia:  Onset of symptoms Sept 28 with diagnosis (10/3) and confirmed SARS-CoV-2 (10/5). Continues to require supplemental oxygen. Treatment with dexamethasone and remdesivir. Droplet plus contact isolation for 20 days from onset of symptoms. Risk of severe disease increased by history of hypertension and class III obesity. Elevated LDH, mild lymphopenia, elevated fibrinogen 503 and d-dimer 278. 2. Hypertension:  Continues on amlodipine 5 mg daily. He has not been on anti-hypertensive prior to hospitalization including ACE inhibitors. 3. Obstructive sleep apnea: Using CPAP from home. 4. Class III obesity (BMI 50.0) complicates medical management and significant risk factor for severe COVID-19 syndrome.      Advance Directive: Full Code  DVT prophylaxis with enoxaparin 40 mg sub-Q daily. Discharge planning:  Tuesday, October 13 after completing five days of remdesivir  Droplet plus contact isolation until October 18. May be discharged to appropriate isolation. He can continue supplemental oxygen if needed.         Richard Delacruz MD  [facility]

## 2022-02-21 DIAGNOSIS — Z00.00 ANNUAL PHYSICAL EXAM: ICD-10-CM

## 2022-02-21 DIAGNOSIS — Z12.5 SCREENING FOR PROSTATE CANCER: ICD-10-CM

## 2022-02-21 DIAGNOSIS — Z82.49 FAMILY HISTORY OF HEART DISEASE: ICD-10-CM

## 2022-02-21 DIAGNOSIS — R39.9 LOWER URINARY TRACT SYMPTOMS (LUTS): ICD-10-CM

## 2022-02-21 DIAGNOSIS — Z87.891 FORMER CIGARETTE SMOKER: ICD-10-CM

## 2022-02-21 DIAGNOSIS — E66.01 CLASS 3 SEVERE OBESITY DUE TO EXCESS CALORIES WITHOUT SERIOUS COMORBIDITY WITH BODY MASS INDEX (BMI) OF 45.0 TO 49.9 IN ADULT (HCC): ICD-10-CM

## 2022-02-21 LAB
A/G RATIO: 1.6 (ref 1.1–2.2)
ALBUMIN SERPL-MCNC: 4.3 G/DL (ref 3.4–5)
ALP BLD-CCNC: 86 U/L (ref 40–129)
ALT SERPL-CCNC: 13 U/L (ref 10–40)
ANION GAP SERPL CALCULATED.3IONS-SCNC: 12 MMOL/L (ref 3–16)
AST SERPL-CCNC: 11 U/L (ref 15–37)
BASOPHILS ABSOLUTE: 0.1 K/UL (ref 0–0.2)
BASOPHILS RELATIVE PERCENT: 0.9 %
BILIRUB SERPL-MCNC: 0.5 MG/DL (ref 0–1)
BILIRUBIN URINE: NEGATIVE
BLOOD, URINE: NEGATIVE
BUN BLDV-MCNC: 13 MG/DL (ref 7–20)
CALCIUM SERPL-MCNC: 9.2 MG/DL (ref 8.3–10.6)
CHLORIDE BLD-SCNC: 105 MMOL/L (ref 99–110)
CHOLESTEROL, FASTING: 181 MG/DL (ref 0–199)
CLARITY: CLEAR
CO2: 26 MMOL/L (ref 21–32)
COLOR: YELLOW
CREAT SERPL-MCNC: 0.9 MG/DL (ref 0.9–1.3)
EOSINOPHILS ABSOLUTE: 0.1 K/UL (ref 0–0.6)
EOSINOPHILS RELATIVE PERCENT: 2.5 %
EPITHELIAL CELLS, UA: 1 /HPF (ref 0–5)
GFR AFRICAN AMERICAN: >60
GFR NON-AFRICAN AMERICAN: >60
GLUCOSE FASTING: 100 MG/DL (ref 70–99)
GLUCOSE URINE: NEGATIVE MG/DL
HCT VFR BLD CALC: 41.9 % (ref 40.5–52.5)
HDLC SERPL-MCNC: 45 MG/DL (ref 40–60)
HEMOGLOBIN: 14 G/DL (ref 13.5–17.5)
HYALINE CASTS: 1 /LPF (ref 0–8)
KETONES, URINE: NEGATIVE MG/DL
LDL CHOLESTEROL CALCULATED: 125 MG/DL
LEUKOCYTE ESTERASE, URINE: NEGATIVE
LYMPHOCYTES ABSOLUTE: 1.5 K/UL (ref 1–5.1)
LYMPHOCYTES RELATIVE PERCENT: 28.2 %
MCH RBC QN AUTO: 29.1 PG (ref 26–34)
MCHC RBC AUTO-ENTMCNC: 33.5 G/DL (ref 31–36)
MCV RBC AUTO: 87 FL (ref 80–100)
MICROSCOPIC EXAMINATION: YES
MONOCYTES ABSOLUTE: 0.5 K/UL (ref 0–1.3)
MONOCYTES RELATIVE PERCENT: 8.9 %
NEUTROPHILS ABSOLUTE: 3.2 K/UL (ref 1.7–7.7)
NEUTROPHILS RELATIVE PERCENT: 59.5 %
NITRITE, URINE: NEGATIVE
PDW BLD-RTO: 14.4 % (ref 12.4–15.4)
PH UA: 7 (ref 5–8)
PLATELET # BLD: 280 K/UL (ref 135–450)
PMV BLD AUTO: 8.4 FL (ref 5–10.5)
POTASSIUM SERPL-SCNC: 4.1 MMOL/L (ref 3.5–5.1)
PROSTATE SPECIFIC ANTIGEN: 0.46 NG/ML (ref 0–4)
PROTEIN UA: ABNORMAL MG/DL
RBC # BLD: 4.81 M/UL (ref 4.2–5.9)
RBC UA: 1 /HPF (ref 0–4)
SODIUM BLD-SCNC: 143 MMOL/L (ref 136–145)
SPECIFIC GRAVITY UA: 1.02 (ref 1–1.03)
TOTAL PROTEIN: 7 G/DL (ref 6.4–8.2)
TRIGLYCERIDE, FASTING: 53 MG/DL (ref 0–150)
URINE TYPE: ABNORMAL
UROBILINOGEN, URINE: 0.2 E.U./DL
VLDLC SERPL CALC-MCNC: 11 MG/DL
WBC # BLD: 5.4 K/UL (ref 4–11)
WBC UA: 1 /HPF (ref 0–5)

## 2022-02-22 LAB
ESTIMATED AVERAGE GLUCOSE: 119.8 MG/DL
HBA1C MFR BLD: 5.8 %

## 2022-05-23 ENCOUNTER — PATIENT MESSAGE (OUTPATIENT)
Dept: PRIMARY CARE CLINIC | Age: 52
End: 2022-05-23

## 2022-05-23 DIAGNOSIS — R39.9 LOWER URINARY TRACT SYMPTOMS (LUTS): ICD-10-CM

## 2022-05-23 DIAGNOSIS — I10 ESSENTIAL HYPERTENSION: Primary | ICD-10-CM

## 2022-05-23 NOTE — TELEPHONE ENCOUNTER
From: Radha Jefferson  To: Dr. Cj Roberts  Sent: 5/23/2022 1:27 PM EDT  Subject: Prescription refill    I need my prescription refilled for my blood pressure medicine and I need a prescription for the urinating issue

## 2022-05-24 RX ORDER — TAMSULOSIN HYDROCHLORIDE 0.4 MG/1
0.4 CAPSULE ORAL DAILY
Qty: 30 CAPSULE | Refills: 5 | Status: SHIPPED | OUTPATIENT
Start: 2022-05-24

## 2022-05-24 RX ORDER — BLOOD PRESSURE TEST KIT
1 KIT MISCELLANEOUS DAILY
Qty: 1 KIT | Refills: 0 | Status: SHIPPED | OUTPATIENT
Start: 2022-05-24 | End: 2022-06-22 | Stop reason: ALTCHOICE

## 2022-05-26 RX ORDER — AMLODIPINE BESYLATE 10 MG/1
10 TABLET ORAL DAILY
Qty: 90 TABLET | Refills: 3 | Status: SHIPPED | OUTPATIENT
Start: 2022-05-26 | End: 2022-10-17

## 2022-06-22 ENCOUNTER — OFFICE VISIT (OUTPATIENT)
Dept: PRIMARY CARE CLINIC | Age: 52
End: 2022-06-22
Payer: COMMERCIAL

## 2022-06-22 VITALS
HEIGHT: 69 IN | WEIGHT: 315 LBS | HEART RATE: 80 BPM | DIASTOLIC BLOOD PRESSURE: 86 MMHG | SYSTOLIC BLOOD PRESSURE: 135 MMHG | OXYGEN SATURATION: 98 % | BODY MASS INDEX: 46.65 KG/M2 | TEMPERATURE: 99.9 F

## 2022-06-22 DIAGNOSIS — E66.01 CLASS 3 SEVERE OBESITY DUE TO EXCESS CALORIES WITHOUT SERIOUS COMORBIDITY WITH BODY MASS INDEX (BMI) OF 45.0 TO 49.9 IN ADULT (HCC): ICD-10-CM

## 2022-06-22 DIAGNOSIS — I10 ESSENTIAL HYPERTENSION: Primary | ICD-10-CM

## 2022-06-22 DIAGNOSIS — Z12.5 SCREENING FOR PROSTATE CANCER: ICD-10-CM

## 2022-06-22 DIAGNOSIS — R39.9 LOWER URINARY TRACT SYMPTOMS (LUTS): ICD-10-CM

## 2022-06-22 PROCEDURE — 99213 OFFICE O/P EST LOW 20 MIN: CPT | Performed by: FAMILY MEDICINE

## 2022-06-22 SDOH — ECONOMIC STABILITY: FOOD INSECURITY: WITHIN THE PAST 12 MONTHS, YOU WORRIED THAT YOUR FOOD WOULD RUN OUT BEFORE YOU GOT MONEY TO BUY MORE.: NEVER TRUE

## 2022-06-22 SDOH — ECONOMIC STABILITY: FOOD INSECURITY: WITHIN THE PAST 12 MONTHS, THE FOOD YOU BOUGHT JUST DIDN'T LAST AND YOU DIDN'T HAVE MONEY TO GET MORE.: NEVER TRUE

## 2022-06-22 ASSESSMENT — ENCOUNTER SYMPTOMS
NAUSEA: 0
SHORTNESS OF BREATH: 0
COUGH: 0
ABDOMINAL PAIN: 0
SORE THROAT: 0

## 2022-06-22 ASSESSMENT — SOCIAL DETERMINANTS OF HEALTH (SDOH): HOW HARD IS IT FOR YOU TO PAY FOR THE VERY BASICS LIKE FOOD, HOUSING, MEDICAL CARE, AND HEATING?: NOT HARD AT ALL

## 2022-06-22 NOTE — PROGRESS NOTES
[address] [facility] [address] PRIMARY CARE  [address] [facility]   Dept: [phone]  Dept Fax: [phone]     2022      Pretty Johansen   1970     Chief Complaint   Patient presents with    Medication Check       HPI  Pt comes in today for follow up. Started on Flomax only a few weeks ago. Urgency has improved. He seems to be emptying bladder better. PHQ Scores 2022 1/10/2020 2019 2017   PHQ2 Score 0 0 0 0   PHQ9 Score 0 0 0 0     Interpretation of Total Score Depression Severity: 1-4 = Minimal depression, 5-9 = Mild depression, 10-14 = Moderate depression, 15-19 = Moderately severe depression, 20-27 = Severe depression     Prior to Visit Medications    Medication Sig Taking? Authorizing Provider   amLODIPine (NORVASC) 10 MG tablet Take 1 tablet by mouth daily Yes Sarah Roy DO   tamsulosin (FLOMAX) 0.4 MG capsule Take 1 capsule by mouth daily Yes Donn Sheehan DO   Cholecalciferol (VITAMIN D3) 125 MCG (5000 UT) TABS Take by mouth Yes Historical Provider, MD   zinc 50 MG CAPS Take by mouth Yes Historical Provider, MD   aspirin EC 81 MG EC tablet Take 1 tablet by mouth daily Yes Donn Sheehan DO       Past Medical History:   Diagnosis Date    Essential hypertension     History of moderate to severe 2019 novel coronavirus disease (COVID-19) - 2020    Severe obstructive sleep apnea         Social History     Tobacco Use    Smoking status: Former Smoker     Packs/day: 0.50     Years: 7.00     Pack years: 3.50     Types: Cigars, Cigarettes     Quit date: 2001     Years since quittin.4    Smokeless tobacco: Never Used    Tobacco comment: light cigar smoker/ 1 every month or so,   Vaping Use    Vaping Use: Never used   Substance Use Topics    Alcohol use: Yes     Alcohol/week: 1.0 - 2.0 standard drink     Types: 1 - 2 Shots of liquor per week     Comment: occasional    Drug use:  No Past Surgical History:   Procedure Laterality Date    APPENDECTOMY      COLONOSCOPY N/A 1/20/2020    COLONOSCOPY W/ ANESTHESIA performed by Chiki Johns MD at [address] ARTHROSCOPY Left         Allergies   Allergen Reactions    Other      Ragweed/pollen/dog hair        Family History   Problem Relation Age of Onset    Heart Disease Father     Heart Attack Father 54    High Blood Pressure Father     Cancer Mother         Multiple Myeloma    Cancer Maternal Grandmother         Lung    Other Brother         RUBI    Other Brother         RUBI        Patient's past medical history, surgical history, family history, medications, and allergies  were all reviewed and updated as appropriate today. Review of Systems   Constitutional: Negative for fever. HENT: Negative for ear pain and sore throat. Respiratory: Negative for cough and shortness of breath. Cardiovascular: Negative for chest pain. Gastrointestinal: Negative for abdominal pain and nausea. Genitourinary: Positive for urgency (improved). Negative for difficulty urinating. Skin: Negative for rash. Neurological: Negative for dizziness and headaches. Hematological: Negative for adenopathy. /86   Pulse 80   Temp 99.9 °F (37.7 °C)   Ht 5' 9\" (1.753 m)   Wt (!) 330 lb (149.7 kg)   SpO2 98%   BMI 48.73 kg/m²      Physical Exam  Vitals reviewed. Constitutional:       General: He is not in acute distress. Appearance: He is obese. HENT:      Head: Normocephalic. Nose: Nose normal.      Mouth/Throat:      Mouth: Mucous membranes are moist.   Eyes:      Extraocular Movements: Extraocular movements intact. Pupils: Pupils are equal, round, and reactive to light. Cardiovascular:      Rate and Rhythm: Normal rate and regular rhythm. Heart sounds: No murmur heard. Pulmonary:      Effort: Pulmonary effort is normal.      Breath sounds: Normal breath sounds. No wheezing. Abdominal:      General: Bowel sounds are normal.      Palpations: Abdomen is soft. Tenderness: There is no abdominal tenderness. Genitourinary:     Comments: deferred  Musculoskeletal:         General: No swelling or deformity. Cervical back: Neck supple. Lymphadenopathy:      Cervical: No cervical adenopathy. Skin:     General: Skin is warm and dry. Findings: No rash. Neurological:      Mental Status: He is alert and oriented to person, place, and time. Cranial Nerves: No cranial nerve deficit. Psychiatric:         Mood and Affect: Mood normal.         Behavior: Behavior is cooperative. Assessment:  Encounter Diagnoses   Name Primary?  Essential hypertension Yes    Lower urinary tract symptoms (LUTS) - suspected BPH, first discussed 1/2022     Screening for prostate cancer     Class 3 severe obesity due to excess calories without serious comorbidity with body mass index (BMI) of 45.0 to 49.9 in [facility])        Plan:  1. Essential hypertension  Chronic, controlled. - CBC with Auto Differential; Future  - Comprehensive Metabolic Panel, Fasting; Future    2. Lower urinary tract symptoms (LUTS) - suspected BPH, first discussed 1/2022  Just recently started Flomax and happy with early improvements. No changes now. 3. Screening for prostate cancer  Discussed prostate cancer screening with male of appropriate age and risk factors. I have provided evidence behind PSA and answered all questions regarding the sensitivity of this test.  At this time, through shared decision making, patient would like to move forward with collection of annual PSA.  - PSA Screening; Future    4. Class 3 severe obesity due to excess calories without serious comorbidity with body mass index (BMI) of 45.0 to 49.9 in [facility])  Patient was asked about current diet and exercise habits, and personalized advice was provided regarding recommended lifestyle changes.   Patient's comorbid health conditions associated with elevated BMI were discussed, as well as the likely benefits weight loss. Based upon patient's motivation to change behavior, the following plan was agreed upon to work toward a weight loss goal - increasing CV activity, reducing carbs/calories and healthy eating habits. Educational materials for weight loss were provided. Patient will follow-up with myself at designated time in future. - Hemoglobin A1C; Future  - Lipid, Fasting; Future      Return in about 7 months (around 1/10/2023) for Physical.               Lori Waller, DO     Please note that this chart was generated using dragon dictation software. Although every effort was made to ensure the accuracy of this automated transcription, some errors in transcription may have occurred.

## 2022-07-01 DIAGNOSIS — R09.81 NASAL CONGESTION: Primary | ICD-10-CM

## 2022-07-01 RX ORDER — AZELASTINE 1 MG/ML
1 SPRAY, METERED NASAL 2 TIMES DAILY
Qty: 30 ML | Refills: 0 | Status: SHIPPED | OUTPATIENT
Start: 2022-07-01

## 2022-10-04 ENCOUNTER — OFFICE VISIT (OUTPATIENT)
Dept: ENT CLINIC | Age: 52
End: 2022-10-04
Payer: COMMERCIAL

## 2022-10-04 VITALS
DIASTOLIC BLOOD PRESSURE: 101 MMHG | HEIGHT: 69 IN | HEART RATE: 85 BPM | BODY MASS INDEX: 46.65 KG/M2 | WEIGHT: 315 LBS | SYSTOLIC BLOOD PRESSURE: 137 MMHG

## 2022-10-04 DIAGNOSIS — J34.3 NASAL TURBINATE HYPERTROPHY: Primary | ICD-10-CM

## 2022-10-04 DIAGNOSIS — J34.2 DEVIATED NASAL SEPTUM: ICD-10-CM

## 2022-10-04 DIAGNOSIS — T48.5X5A RHINITIS MEDICAMENTOSA: ICD-10-CM

## 2022-10-04 DIAGNOSIS — J31.0 RHINITIS MEDICAMENTOSA: ICD-10-CM

## 2022-10-04 DIAGNOSIS — J34.89 NASAL OBSTRUCTION: ICD-10-CM

## 2022-10-04 PROCEDURE — 99204 OFFICE O/P NEW MOD 45 MIN: CPT | Performed by: OTOLARYNGOLOGY

## 2022-10-04 ASSESSMENT — ENCOUNTER SYMPTOMS
CHOKING: 0
VOMITING: 0
SINUS PAIN: 0
CONSTIPATION: 0
SHORTNESS OF BREATH: 0
SINUS PRESSURE: 0
TROUBLE SWALLOWING: 0
FACIAL SWELLING: 0
BLOOD IN STOOL: 0
EYE DISCHARGE: 0
COUGH: 0
CHEST TIGHTNESS: 0
COLOR CHANGE: 0
SORE THROAT: 0
RHINORRHEA: 0
EYE PAIN: 0
STRIDOR: 0
DIARRHEA: 0
BACK PAIN: 0
WHEEZING: 0
NAUSEA: 0
VOICE CHANGE: 0
APNEA: 0

## 2022-10-04 NOTE — LETTER
[facility]  [address]  Phone: [phone]  Fax: [phone] Josue Pérez MD    October 4, 2022     Mile Beasley, [address] [place] [address]    Patient: Sandie Rodriguez   MR Number: [id number]   YOB: 1970   Date of Visit: 10/4/2022       Dear Mile Beasley: Thank you for referring Erasto Cullen to me for evaluation/treatment. Below are the relevant portions of my assessment and plan of care. Diagnosis Orders   1. Nasal turbinate hypertrophy        2. Nasal obstruction        3. Rhinitis medicamentosa        4. Deviated nasal septum              OR for septoplasty and turbinate reduction. Failed medical therapy. The patient has a diagnosis of nasal obstruction which has not been responsive or cannot be treated with maximal medical therapy. The patient has been advised of options including further medical therapy, surgery, or nothing. The risks and benefits of surgery were described not limited to injury to the skull base, brain, stroke, hemorrhage, brain fluid leak, double vision, visual loss, epistaxis, and need for further surgical management of disease. Patient expectations during and after surgery including post-operative sinonasal care and pain control were described. Other health co-morbidities that would increase the risks of bodily harm, complications and risk of death including none were not discussed . Questions were answered and the patient agreed to proceed with surgery which will be scheduled in an appropriate time frame in line with the severity of disease. If you have questions, please do not hesitate to call me. I look forward to following Yue Dutton along with you.     Sincerely,      Joe Frazier MD

## 2022-10-04 NOTE — PROGRESS NOTES
Subjective:      Patient ID: Roseanna Peraza is a 47 y.o. male. HPI  Chief Complaint   Patient presents with    Nasal obstruction  History of Present Illness:Charlene is a(n) 47 y.o. male who presents with a 20 year history of nasal obstruction. Right worse than left and never fully free. Uses afrin 5 times a day. Alternates. Breathes great with afrin. Has tried flonase and astelin without relief.    Nasal Discharge: none  Nasal Obstruction: Yes bilateral; constant  Post Nasal Drainage: No  Nasal Bleeding: No  Sense of Smell: decreased  Eye Symptoms: none  Previous Treatments: As above     Patient Active Problem List   Diagnosis    Class 3 severe obesity due to excess calories without serious comorbidity with body mass index (BMI) of 45.0 to 49.9 in [facility])    Essential hypertension    Obstructive sleep apnea syndrome with CPAP    History of moderate to severe 2019 novel coronavirus disease (COVID-19) - 11/2020    Former cigarette smoker    Family history of heart disease    Lower urinary tract symptoms (LUTS) - suspected BPH, first discussed 1/2022     Past Surgical History:   Procedure Laterality Date    APPENDECTOMY      COLONOSCOPY N/A 1/20/2020    COLONOSCOPY W/ ANESTHESIA performed by Jean-Paul Eldridge MD at [address] [place] [address] ARTHROSCOPY Left      Family History   Problem Relation Age of Onset    Heart Disease Father     Heart Attack Father 54    High Blood Pressure Father     Cancer Mother         Multiple Myeloma    Cancer Maternal Grandmother         Lung    Other Brother         RUBI    Other Brother         RUBI     Social History     Socioeconomic History    Marital status:      Spouse name: Not on file    Number of children: 2    Years of education: Not on file    Highest education level: Not on file   Occupational History    Occupation:  - [employer]   Tobacco Use    Smoking status: Some Days     Types: Cigars    Smokeless tobacco: Never    Tobacco comments: light cigar smoker/ 1 every month or so,   Vaping Use    Vaping Use: Never used   Substance and Sexual Activity    Alcohol use: Yes     Alcohol/week: 1.0 - 2.0 standard drink     Types: 1 - 2 Shots of liquor per week     Comment: occasional    Drug use: No    Sexual activity: Yes     Partners: Female   Other Topics Concern    Not on file   Social History Narrative    Not on file     Social Determinants of Health     Financial Resource Strain: Low Risk     Difficulty of Paying Living Expenses: Not hard at all   Food Insecurity: No Food Insecurity    Worried About Running Out of Food in the Last Year: Never true    Ran Out of Food in the Last Year: Never true   Transportation Needs: Not on file   Physical Activity: Not on file   Stress: Not on file   Social Connections: Not on file   Intimate Partner Violence: Not on file   Housing Stability: Not on file       DRUG/FOOD ALLERGIES: Other    [address]  Prior to Admission medications    Medication Sig Start Date End Date Taking?  Authorizing Provider   azelastine (ASTELIN) 0.1 % nasal spray 1 spray by Nasal route 2 times daily Use in each nostril as directed 7/1/22  Yes BERTA Boateng - CNP   [facility]) 0.4 MG capsule Take 1 capsule by mouth daily 5/24/22  Yes Violeta Lucio DO   Cholecalciferol (VITAMIN D3) 125 MCG (5000 UT) TABS Take by mouth   Yes Historical Provider, MD   zinc 50 MG CAPS Take by mouth   Yes Historical Provider, MD   aspirin EC 81 MG EC tablet Take 1 tablet by mouth daily 1/31/22  Yes Violeta Lucio DO   amLODIPine (NORVASC) 10 MG tablet Take 1 tablet by mouth daily 5/26/22 6/25/22  Violeta Lucio DO         Lab Studies:  Lab Results   Component Value Date    WBC 5.4 02/21/2022    HGB 14.0 02/21/2022    HCT 41.9 02/21/2022    MCV 87.0 02/21/2022     02/21/2022     Lab Results   Component Value Date    GLUCOSE 109 (H) 11/11/2020    BUN 13 02/21/2022    CREATININE 0.9 02/21/2022    K 4.1 02/21/2022     02/21/2022     02/21/2022    CALCIUM 9.2 02/21/2022     No results found for: MG  Lab Results   Component Value Date    PHOS 3.4 10/10/2020     Lab Results   Component Value Date    ALKPHOS 86 02/21/2022    ALT 13 02/21/2022    AST 11 (L) 02/21/2022    BILITOT 0.5 02/21/2022    PROT 7.0 02/21/2022      Review of Systems   Constitutional:  Negative for activity change, appetite change, chills, fatigue and fever. HENT:  Positive for congestion. Negative for dental problem, drooling, ear discharge, ear pain, facial swelling, hearing loss, mouth sores, nosebleeds, postnasal drip, rhinorrhea, sinus pressure, sinus pain, sneezing, sore throat, tinnitus, trouble swallowing and voice change. Eyes:  Negative for pain, discharge and visual disturbance. Respiratory:  Negative for apnea, cough, choking, chest tightness, shortness of breath, wheezing and stridor. Cardiovascular:  Negative for palpitations. Gastrointestinal:  Negative for blood in stool, constipation, diarrhea, nausea and vomiting. Endocrine: Negative for cold intolerance, heat intolerance, polydipsia, polyphagia and polyuria. Musculoskeletal:  Negative for back pain, gait problem, neck pain and neck stiffness. Skin:  Negative for color change, pallor, rash and wound. Allergic/Immunologic: Negative for environmental allergies, food allergies and immunocompromised state. Neurological:  Negative for dizziness, facial asymmetry, speech difficulty, light-headedness, numbness and headaches. Hematological:  Negative for adenopathy. Does not bruise/bleed easily. Psychiatric/Behavioral:  Negative for agitation, confusion, self-injury and sleep disturbance. The patient is not nervous/anxious. Objective:   Physical Exam  Constitutional:       Appearance: He is well-developed. He is not ill-appearing. HENT:      Head: Normocephalic and atraumatic. Not macrocephalic and not microcephalic.  No raccoon eyes, Grimaldo's sign, abrasion, contusion, right periorbital erythema, left periorbital erythema or laceration. Hair is normal.      Jaw: No trismus. Salivary Glands: Right salivary gland is not diffusely enlarged. Left salivary gland is not diffusely enlarged. Right Ear: Hearing, tympanic membrane and external ear normal. No decreased hearing noted. No drainage, swelling or tenderness. No middle ear effusion. No mastoid tenderness. Tympanic membrane is not perforated, retracted or bulging. Tympanic membrane has normal mobility. Left Ear: Hearing, tympanic membrane and external ear normal. No decreased hearing noted. No drainage, swelling or tenderness. No middle ear effusion. No mastoid tenderness. Tympanic membrane is not perforated, retracted or bulging. Tympanic membrane has normal mobility. Ears:      Quinteros exam findings: Does not lateralize. Right Rinne: AC > BC. Left Rinne: AC > BC. Nose: Septal deviation present. No nasal deformity, laceration, mucosal edema or rhinorrhea. Right Nostril: No epistaxis. Left Nostril: No epistaxis. Right Turbinates: Swollen. Not enlarged. Left Turbinates: Swollen. Not enlarged. Right Sinus: No maxillary sinus tenderness or frontal sinus tenderness. Left Sinus: No maxillary sinus tenderness or frontal sinus tenderness. Mouth/Throat:      Lips: No lesions. Mouth: Mucous membranes are not pale, not dry and not cyanotic. No lacerations or oral lesions. Dentition: Normal dentition. No dental caries or dental abscesses. Tongue: No lesions. Palate: No mass. Pharynx: Uvula midline. No oropharyngeal exudate, posterior oropharyngeal erythema or uvula swelling. Tonsils: No tonsillar abscesses. Eyes:      General: Lids are normal. Lids are everted, no foreign bodies appreciated. Right eye: No discharge. Left eye: No discharge.       Extraocular Movements:      Right eye: Normal extraocular motion and no nystagmus. Left eye: Normal extraocular motion and no nystagmus. Conjunctiva/sclera:      Right eye: No chemosis or exudate. Left eye: No chemosis or exudate. Neck:      Thyroid: No thyroid mass or thyromegaly. Vascular: Normal carotid pulses. Trachea: Trachea normal. No tracheal deviation. Cardiovascular:      Rate and Rhythm: Normal rate and regular rhythm. Pulmonary:      Effort: No tachypnea, bradypnea or respiratory distress. Breath sounds: No stridor. Musculoskeletal:      Right shoulder: Normal range of motion. Left shoulder: Normal range of motion. Cervical back: Neck supple. Lymphadenopathy:      Head:      Right side of head: No submental, submandibular, tonsillar, preauricular, posterior auricular or occipital adenopathy. Left side of head: No submental, submandibular, tonsillar, preauricular, posterior auricular or occipital adenopathy. Cervical:      Right cervical: No superficial, deep or posterior cervical adenopathy. Left cervical: No superficial, deep or posterior cervical adenopathy. Skin:     Findings: No bruising, erythema, laceration or lesion. Neurological:      Mental Status: He is alert and oriented to person, place, and time. Psychiatric:         Speech: Speech normal.         Behavior: Behavior normal.       Assessment:       Diagnosis Orders   1. Nasal turbinate hypertrophy        2. Nasal obstruction        3. Rhinitis medicamentosa        4. Deviated nasal septum                Plan:      OR for septoplasty and turbinate reduction. Failed medical therapy. The patient has a diagnosis of nasal obstruction which has not been responsive or cannot be treated with maximal medical therapy. The patient has been advised of options including further medical therapy, surgery, or nothing.   The risks and benefits of surgery were described not limited to injury to the skull base, brain, stroke, hemorrhage, brain

## 2022-10-04 NOTE — LETTER
[facility]    Surgery Schedule Request Form: 10/04/22  [address] [place]      DATE OF SURGERY: 11-    TIME OF SURGERY:  7:30AM            CONF #: ____________________       Patient Information:    Patient name: Estee Khanna    YOB: 1970 Age/Sex:52 y.o./male    SS #:[id number]    Wt Readings from Last 1 Encounters:   10/04/22 (!) 336 lb (152.4 kg)       Telephone Information:   Mobile [phone]     Home [phone]     Surgeon & Procedure Information:     Lead surgeon: Sheri Nguyen Co-Surgeon: rosie  Phone: [phone] Fax: [phone]  PCP: Sena Jacob DO    Diagnosis: deviated septum-J34.2, turbinate hypertrophy-J34.3, nasal obstruction-J34.89    Procedure name/CPT: Bilateral Inferior Turbinate Reduction (87812-25) and Septoplasty (16790)    Procedure length: 90 minutes Anesthesia: General    Special Equipment: no    Patient Status: SDS (OP)    Primary Payor Plan: Gill [address] 150  Member ID: [id number]   [facility] [address] name: Tulio Villanueva     [] Implement attached clinical orders for patient.       Electronically signed by Karla Fuentes MD on 10/4/2022 at 10:55 AM

## 2022-10-07 ENCOUNTER — TELEPHONE (OUTPATIENT)
Dept: ENT CLINIC | Age: 52
End: 2022-10-07

## 2022-10-17 ENCOUNTER — OFFICE VISIT (OUTPATIENT)
Dept: PRIMARY CARE CLINIC | Age: 52
End: 2022-10-17
Payer: COMMERCIAL

## 2022-10-17 VITALS
TEMPERATURE: 98.1 F | DIASTOLIC BLOOD PRESSURE: 82 MMHG | BODY MASS INDEX: 46.65 KG/M2 | HEART RATE: 80 BPM | OXYGEN SATURATION: 99 % | HEIGHT: 69 IN | SYSTOLIC BLOOD PRESSURE: 133 MMHG | WEIGHT: 315 LBS

## 2022-10-17 DIAGNOSIS — I10 ESSENTIAL HYPERTENSION: ICD-10-CM

## 2022-10-17 DIAGNOSIS — M23.206: Primary | ICD-10-CM

## 2022-10-17 DIAGNOSIS — J34.3 NASAL TURBINATE HYPERTROPHY: ICD-10-CM

## 2022-10-17 DIAGNOSIS — Z01.818 PREOPERATIVE EXAMINATION: ICD-10-CM

## 2022-10-17 DIAGNOSIS — E66.01 CLASS 3 SEVERE OBESITY DUE TO EXCESS CALORIES WITHOUT SERIOUS COMORBIDITY WITH BODY MASS INDEX (BMI) OF 45.0 TO 49.9 IN ADULT (HCC): ICD-10-CM

## 2022-10-17 DIAGNOSIS — J34.2 DEVIATED SEPTUM: ICD-10-CM

## 2022-10-17 PROCEDURE — 99213 OFFICE O/P EST LOW 20 MIN: CPT | Performed by: FAMILY MEDICINE

## 2022-10-17 RX ORDER — LORATADINE 10 MG/1
10 TABLET ORAL DAILY
COMMUNITY

## 2022-10-17 RX ORDER — DICLOFENAC SODIUM 75 MG/1
TABLET, DELAYED RELEASE ORAL
COMMUNITY
Start: 2022-10-17

## 2022-10-17 ASSESSMENT — ENCOUNTER SYMPTOMS
VOMITING: 0
DIARRHEA: 0
SORE THROAT: 0
NAUSEA: 0
SHORTNESS OF BREATH: 0
COUGH: 0
WHEEZING: 0
ABDOMINAL PAIN: 0
EYE PAIN: 0
EYE ITCHING: 0
CONSTIPATION: 0

## 2022-10-17 NOTE — Clinical Note
Ketan New, just wanted to let you know that Elena Haque was actually here for a preoperative evaluation with me for a right knee scope tomorrow. I see that he is scheduled for surgery with you on November 18. I told him that I will send this to you and I am happy for this to be sufficient for his preoperative evaluation for that. Thanks.  Shantanu Wilhelm

## 2022-10-17 NOTE — PATIENT INSTRUCTIONS
[facility] Locations - No appointment necessary. @ indicates the location is open Saturdays in addition to Monday through Friday. Call your preferred location for test preparation, business hours and other information you need. [employer] accepts [employer]. [facility]    @ [place] [facility] [address] [facility] [address] [place] [address]. [place], [address]   Ph: [phone] [facility] [address]   Ph: [phone]  @ [facility] S[facility] [address] [facility]   Ph: [phone]    [facility] S[facility] [address]   Ph: [phone] @ [place] [facility] [address]  Ph: [phone] @ [facility] [address] [place] [address]. [address]   Ph: [phone]     [facility] Becerra Svcs. [place] [address]  Ph: [phone]    [place]   @ [place] [facility] [address] [place], [place] [address]   Ph: [phone] [facility] Med. Office Bldg. [address] [facility], [address]  Ph: [phone] [facility]   [address] Lab Svcs[address] 54 [facility]   Ph: [phone] [facility]. Lab Svcs.   [address] [facility], [address]   Ph: [phone]

## 2022-10-17 NOTE — PROGRESS NOTES
[address] [facility] [address] [facility]  [address] [facility] [address]  Dept: [phone]  Dept Fax: [phone]     10/17/2022      Portia Jasmine   1970     Chief Complaint   Patient presents with    Pre-op Exam     Right knee meniscus, Tomeka Ashraf, [place] [facility] ([facility]) 10/18/22       HPI  Pt comes in today for preoperative evaluation. He is scheduled for a right knee arthroscopy tomorrow with Dr. Tomeka Ashraf. He is also scheduled for a turbinate reduction and septoplasty with Dr. Calvin Pavon on November 18. Patient has had numerous surgeries before in the past, has tolerated anesthesia and operative procedures well without complications. He is not due for routine labs until early next year, last labs that were done were in February 2022. He is doing well overall and has no acute concerns or questions today. He declines his flu vaccine.     Marissa Dowling [phone]    Lab Results   Component Value Date    WBC 5.4 02/21/2022    HGB 14.0 02/21/2022    HCT 41.9 02/21/2022    MCV 87.0 02/21/2022     02/21/2022     Lab Results   Component Value Date     02/21/2022    K 4.1 02/21/2022     02/21/2022    CO2 26 02/21/2022    BUN 13 02/21/2022    CREATININE 0.9 02/21/2022    GLUCOSE 109 (H) 11/11/2020    CALCIUM 9.2 02/21/2022    PROT 7.0 02/21/2022    LABALBU 4.3 02/21/2022    BILITOT 0.5 02/21/2022    ALKPHOS 86 02/21/2022    AST 11 (L) 02/21/2022    ALT 13 02/21/2022    LABGLOM >60 02/21/2022    GFRAA >60 02/21/2022    AGRATIO 1.6 02/21/2022    GLOB 3.2 10/13/2020     Lab Results   Component Value Date/Time    LABA1C 5.8 02/21/2022 10:24 AM       Lab Results   Component Value Date    CHOL 205 (H) 11/11/2020    CHOL 178 06/04/2019    CHOL 190 12/04/2017     Lab Results   Component Value Date    TRIG 72 11/11/2020    TRIG 71 06/04/2019    TRIG 61 12/04/2017     Lab Results   Component Value Date    HDL 45 02/21/2022    HDL 43 11/11/2020    HDL 43 06/04/2019 Lab Results   Component Value Date    LDLCALC 125 (H) 02/21/2022    LDLCALC 148 (H) 11/11/2020    LDLCALC 121 (H) 06/04/2019     Lab Results   Component Value Date    LABVLDL 11 02/21/2022    LABVLDL 14 11/11/2020    LABVLDL 14 06/04/2019       The 10-year ASCVD risk score (Onel VICKERS, et al., 2019) is: 16.5%    Values used to calculate the score:      Age: 47 years      Sex: Male      Is Non- : Yes      Diabetic: No      Tobacco smoker: Yes      Systolic Blood Pressure: 929 mmHg      Is BP treated: Yes      HDL Cholesterol: 45 mg/dL      Total Cholesterol: 181 mg/dL       PHQ Scores 1/31/2022 1/10/2020 6/4/2019 9/1/2017   PHQ2 Score 0 0 0 0   PHQ9 Score 0 0 0 0     Interpretation of Total Score Depression Severity: 1-4 = Minimal depression, 5-9 = Mild depression, 10-14 = Moderate depression, 15-19 = Moderately severe depression, 20-27 = Severe depression     Prior to Visit Medications    Medication Sig Taking?  Authorizing Provider   loratadine (CLARITIN) 10 MG tablet Take 10 mg by mouth daily Yes Historical Provider, MD   amLODIPine (NORVASC) 10 MG tablet Take 1 tablet by mouth daily Yes Aniceto Roy DO   diclofenac (VOLTAREN) 75 MG EC tablet TAKE 1 TABLET(75 MG) BY MOUTH TWICE DAILY  Historical Provider, MD   azelastine (ASTELIN) 0.1 % nasal spray 1 spray by Nasal route 2 times daily Use in each nostril as directed  Patient not taking: Reported on 10/17/2022  BERTA Canchola - CNP   tamsulosin [facility]) 0.4 MG capsule Take 1 capsule by mouth daily  Patient not taking: Reported on 10/17/2022  Melly Wilson DO   Cholecalciferol (VITAMIN D3) 125 MCG (5000 UT) TABS Take by mouth  Patient not taking: Reported on 10/17/2022  Historical Provider, MD   zinc 50 MG CAPS Take by mouth  Patient not taking: Reported on 10/17/2022  Historical Provider, MD   aspirin EC 81 MG EC tablet Take 1 tablet by mouth daily  Patient not taking: Reported on 10/17/2022  Emelia Shukla Rubin Montague DO       Past Medical History:   Diagnosis Date    Essential hypertension     History of moderate to severe 2019 novel coronavirus disease (COVID-19) - 11/2020 11/6/2020    Severe obstructive sleep apnea         Social History     Tobacco Use    Smoking status: Some Days     Types: Cigars    Smokeless tobacco: Never    Tobacco comments:     light cigar smoker/ 1 every month or so,   Vaping Use    Vaping Use: Never used   Substance Use Topics    Alcohol use: Yes     Alcohol/week: 1.0 - 2.0 standard drink     Types: 1 - 2 Shots of liquor per week     Comment: occasional    Drug use: No        Past Surgical History:   Procedure Laterality Date    APPENDECTOMY      COLONOSCOPY N/A 1/20/2020    COLONOSCOPY W/ ANESTHESIA performed by Ubaldo Wells MD at [address] [place], [place] [address] ARTHROSCOPY Left         Allergies   Allergen Reactions    Other      Ragweed/pollen/dog hair        Family History   Problem Relation Age of Onset    Heart Disease Father     Heart Attack Father 54    High Blood Pressure Father     Cancer Mother         Multiple Myeloma    Cancer Maternal Grandmother         Lung    Other Brother         RUBI    Other Brother         RUBI        Patient's past medical history, surgical history, family history, medications, and allergies  were all reviewed and updated as appropriate today. Review of Systems   Constitutional:  Negative for fatigue, fever and unexpected weight change. HENT:  Positive for congestion. Negative for ear pain and sore throat. Eyes:  Negative for pain, itching and visual disturbance. Respiratory:  Negative for cough, shortness of breath and wheezing. Cardiovascular:  Negative for chest pain, palpitations and leg swelling. Gastrointestinal:  Negative for abdominal pain, constipation, diarrhea, nausea and vomiting. Endocrine: Negative for cold intolerance, heat intolerance, polydipsia and polyuria.    Genitourinary:  Negative for dysuria, frequency and hematuria. Musculoskeletal:  Positive for arthralgias (R knee). Negative for joint swelling. Skin:  Negative for rash. Neurological:  Negative for dizziness and headaches. Hematological:  Negative for adenopathy. /82 (Cuff Size: Large Adult)   Pulse 80   Temp 98.1 °F (36.7 °C) (Oral)   Ht 5' 9\" (1.753 m)   Wt (!) 332 lb 6.4 oz (150.8 kg)   SpO2 99% Comment: room air  BMI 49.09 kg/m²      Physical Exam  Vitals reviewed. Constitutional:       General: He is not in acute distress. Appearance: Normal appearance. He is well-developed and normal weight. HENT:      Head: Normocephalic and atraumatic. Right Ear: Tympanic membrane and ear canal normal. No drainage. No middle ear effusion. Tympanic membrane is not erythematous. Left Ear: Tympanic membrane and ear canal normal. No drainage. No middle ear effusion. Tympanic membrane is not erythematous. Nose: Septal deviation and congestion present. Mouth/Throat:      Mouth: Mucous membranes are moist.      Pharynx: No oropharyngeal exudate or posterior oropharyngeal erythema. Eyes:      Extraocular Movements: Extraocular movements intact. Pupils: Pupils are equal, round, and reactive to light. Neck:      Thyroid: No thyromegaly. Cardiovascular:      Rate and Rhythm: Normal rate and regular rhythm. Heart sounds: No murmur heard. Pulmonary:      Effort: Pulmonary effort is normal.      Breath sounds: Normal breath sounds. No wheezing. Abdominal:      General: Bowel sounds are normal.      Palpations: Abdomen is soft. There is no mass. Tenderness: There is no abdominal tenderness. Musculoskeletal:         General: No swelling or deformity. Normal range of motion. Cervical back: Neck supple. Lymphadenopathy:      Cervical: No cervical adenopathy. Skin:     General: Skin is warm and dry. Findings: No rash. Neurological:      General: No focal deficit present.       Mental Status: He is alert and oriented to person, place, and time. Cranial Nerves: No cranial nerve deficit. Psychiatric:         Mood and Affect: Mood normal.         Behavior: Behavior is cooperative. Assessment:  Encounter Diagnoses   Name Primary? Derangement of meniscus of right knee due to old injury Yes    Nasal turbinate hypertrophy     Deviated septum     Preoperative examination     Essential hypertension     Class 3 severe obesity due to excess calories without serious comorbidity with body mass index (BMI) of 45.0 to 49.9 in [facility])        Plan:  1. Derangement of meniscus of right knee due to old injury  Newly diagnosed meniscal injury. Here for preoperative examination, scheduled for arthroscopic procedure of the right knee tomorrow. 2. Nasal turbinate hypertrophy  Recently seen by Dr. Nimco Franco (ENT) and is scheduled for November 18 for surgical correction. Here for preoperative evaluation. 3. Deviated septum  Same as above. 4. Preoperative examination  Upcoming surgical intervention discussed with patient and reviewed paperwork that was provided today. Chart review done including past surgical history, family history and any complications perioperative/postoperative in the past.  This history is benign. Patient has not had any issues with anesthesia. At this time we have discussed current medication, and recommendations for upcoming surgery based on specialist recommendations. We have reviewed the current status of any chronic medical conditions that we are currently managing. At this time I feel like patient is average risk for the described procedure. We will fax this note, along with any labs and EKG/imaging that has been done for preoperative evaluation. 5. Essential hypertension  Controlled.      6. Class 3 severe obesity due to excess calories without serious comorbidity with body mass index (BMI) of 45.0 to 49.9 in [facility])  Patient was asked about current diet and exercise habits, and personalized advice was provided regarding recommended lifestyle changes. Patient's comorbid health conditions associated with elevated BMI were discussed, as well as the likely benefits weight loss. Based upon patient's motivation to change behavior, the following plan was agreed upon to work toward a weight loss goal - increasing CV activity, reducing carbs/calories and healthy eating habits. Educational materials for weight loss were provided. Patient will follow-up with myself at designated time in future. Return in about 4 months (around 2/1/2023) for Physical.               Lencho Lowry, DO     Please note that this chart was generated using dragon dictation software. Although every effort was made to ensure the accuracy of this automated transcription, some errors in transcription may have occurred. No

## 2022-11-16 NOTE — PROGRESS NOTES
Place patient label inside box (if no patient label, complete below)  Name:  :  MR#:   Dana Garcia / PROCEDURE  I (we)Christi (Patient Name) authorize DR. AREN Taylor (Provider / Khari Linares) and/or such assistants as may be selected by him/her, to perform the following operation/procedure(s): BILATERAL INFERIOR TURBINATE REDUCTION AND SEPTOPLASTY       Note: If unable to obtain consent prior to an emergent procedure, document the emergent reason in the medical record. This procedure has been explained to my (our) satisfaction and included in the explanation was: The intended benefit, nature, and extent of the procedure to be performed; The significant risks involved and the probability of success; Alternative procedures and methods of treatment; The dangers and probable consequences of such alternatives (including no procedure or treatment); The expected consequences of the procedure on my future health; Whether other qualified individuals would be performing important surgical tasks and/or whether  would be present to advise or support the procedure. I (we) understand that there are other risks of infection and other serious complications in the pre-operative/procedural and postoperative/procedural stages of my (our) care. I (we) have asked all of the questions which I (we) thought were important in deciding whether or not to undergo treatment or diagnosis. These questions have been answered to my (our) satisfaction. I (we) understand that no assurance can be given that the procedure will be a success, and no guarantee or warranty of success has been given to me (us). It has been explained to me (us) that during the course of the operation/procedure, unforeseen conditions may be revealed that necessitate extension of the original procedure(s) or different procedure(s) than those set forth in Paragraph 1.  I (we) SLEEP HISTORY QUESTIONNAIRE    Please describe the main reason for your sleep appointment? Stop breathing sometimes, snoring, dry mouth, tired all the time    How long has this been a problem? 10years    Have you been diagnosed with a sleep problem in the past? YES    If so, what? Question not answered    What treatment was recommended? Mask, sleep mask    Have you had a sleep study in the past? YES    If yes, where and when? [facility]    Sleep Habits:   Do you read in bed? Yes  Do you eat in bed? No  Do you watch TV in bed? Yes  Do you work in bed? No  Do you use a phone or computer in bed? Yes    Is you sleep disturbed by:   Bed partner: No  Children: No  Noise: Yes   Pets: No  Other:       On two or more nights per week, do you drink alcohol to help you fall asleep?NO    On two or more nights per week, do you take melatonin to help you fall asleep? YES    On two or more nights per week, do you take over the counter medicine to fall asleep?  YES    Do you take drinks with caffeine (coffee, tea, soda, energy drinks)? NO    Do you have 3 or more caffeine drinks in a day? YES    Do you have caffeine drinks within 6 hours of bedtime? NO    Do you smoke or use tobacco? YES    Do you exercise? NO    Sleep Routine:   Using a 24 Hour Clock    What time do you usually get into bed on workdays? 10pm    Weekend/non work days?     What time do you get out of bed on workdays? 9am      Weekend/non work days?    Do you work the evening or night shift or do your shifts rotate? NO    How long does it usually take to fall to sleep? Hours    How many times do you wake during the night?     How much time do you feel that you are awake during the entire night? 6    How long does it take for you to fall back to sleep after you wake up? hours    Why do you think you wake up? Don't know     What do you do when you wake up? Lay there    How much sleep do you think you get on work nights? Don't work    How much sleep do you  authorize and request that the above-named physician, his/her assistants or his/her designees, perform procedures as necessary and desirable if deemed to be in my (our) best interest.     Revised 8/2/2021                                                                          Page 1 of 2       I acknowledge that health care personnel may be observing this procedure for the purpose of medical education or other specified purposes as may be necessary as requested and/or approved by my (our) physician. I (we) consent to the disposal by the hospital Pathologist of the removed tissue, parts or organs in accordance with hospital policy. I do ____ do not ____ consent to the use of a local infiltration pain blocking agent that will be used by my provider/surgical provider to help alleviate pain during my procedure. I do ____ do not ____ consent to an emergent blood transfusion in the case of a life-threatening situation that requires blood components to be administered. This consent is valid for 24 hours from the beginning of the procedure. This patient does ____ or does not ____ currently have a DNR status/order. If DNR order is in place, obtain Addendum to the Surgical Consent for ALL Patients with a DNR Order to address poonam-operative status for limited intervention or DNR suspension.      I have read and fully understand the above Consent for Operation/Procedure and that all blanks were completed before I signed the consent.   _____________________________       _____________________      ____/____am/pm  Signature of Patient or legal representative      Printed Name / Relationship            Date / Time   ____________________________       _____________________      ____/____am/pm  Witness to Signature                                    Printed Name                    Date / Time    If patient is unable to sign or is a minor, complete the following)  Patient is a minor, ____ years of age, or unable to think you get on weekends/non work days?     How much sleep do you think you need to feel your best? 8    How many days during a week do you take a nap on average? 0    What is the average length of your naps?     Do you feel better after taking a nap? DOES NOT APPLY    If you could chose the best sleep schedule for you, what time would you go to bed? 10pm  What time would you get up? depends    Do you read in bed? YES    Do you eat in bed? NO    Do you watch TV in bed? YES    Do you do work in bed? NO    Do you use a computer or phone in bed? YES    Sleep Disruptions?   Leg movements:  Do you ever have restless, crawling, aching or other unusual feelings in your legs? YES    Do you ever wake yourself by kicking your legs during the night? NO    Are the sheets and blankets messed up or tossed about when you get up? YES    Night-time behaviors:   Do you have nightmares or night terrors? Don't remember   How often?     Have you had times when you were sleep walking?     Have you been seen doing anything unusual while you sleep at nights? YES  What? Standing up or walking  How often? Few times    Have you ever hurt yourself or someone else while you were sleeping? NO  Please describe:     Do you clench or grind your teeth during the night? YES    Sleep Apnea (pauses in breathing during sleep):  Do you wake with a headache in the morning? YES  How often? sometimes    Does your bed partner, family or friends ever say that you snore? YES  How many nights per week do you snore?   Can snoring be heard outside the bedroom? Loud    Do you ever wake yourself up from snoring, gasping or choking? YES    Have you ever been told that you stop breathing or have pauses in your breathing? YES    Do you wake in the morning with a dry throat or mouth? YES    Do you have trouble breathing through your nose? YES    Do you have problems with heartburn, reflux or a hiatal hernia? YES    Which positions do you usually sleep in? (stomach,  back, sides, all) sides    Do you use oxygen or any other medical equipment when you sleep? NO    Do members of your family (related by blood) snore? Don't know    Have any members of your family been diagnosed with with sleep apnea? NO    Do other members of your family have restless leg? NO    Do other members of your family have sleep walking? NO    Have you ever had an accident, or near accident due to sleepiness while driving? NO    Does your sleepiness affect your work on the job or at school? YES    Do you ever fall asleep by accident while doing a task? NO    Have you had sudden muscle weakness when laughing, angry or surprised? YES    Have you ever been unable to move your body when falling asleep or waking up? NO    Do you ever have trouble  your dreams from real life events? NO  Please describe:     Physical Health: (including illness and injury): During the past 30 days, on how many days was your physical health not good? ?/30 days     Mental Health: (including stress, depression, and problems with emotions): During the last 30 days, how may days was your mental health not good? ?/30 days.     During the past 30 days, on how many days did poor physical or mental health keep you from doing your usual activities? This might be self-care, work, or play? ?/30 days.     Social History:   Marital status: Single     Who lives in your home with you? Myself    Mother (alive or dead)?  If has , from what?   Father (alive or dead)?  If has , from what?     Siblings:   Have any ?   If so, from what?     Currently working? NO  If yes, work:   Former jobs:      Sleepiness Scale:   Sitting and reading 0   Watching TV 0   Sitting in a public place 0   Riding in a car 0   Lying down to rest in the afternoon 0   Sitting and talking to someone 0   Sitting quietly after a lunch without alcohol 0   In a car, stopping for a few minutes in traffic 0       Surgical History:   Past Surgical History:  sign because:   ______________________________________________________________________________________________    If a phone consent is obtained, consent will be documented by using two health care professionals, each affirming that the consenting party has no questions and gives consent for the procedure discussed with the physician/provider.   _____________________          ____________________       _____/_____am/pm   2nd witness to phone consent        Printed name           Date / Time    Informed Consent:  I have provided the explanation described above in section 1 to the patient and/or legal representative.  I have provided the patient and/or legal representative with an opportunity to ask any questions about the proposed operation/procedure.   ___________________________          ____________________         ____/____am/pm  Provider / Proceduralist                            Printed name            Date / Time  Revised 8/2/2021                                                                      Page 2 of 2   Procedure Laterality Date     CHOLECYSTECTOMY       COLONOSCOPY  06/29/2016    done at [facility] in [place]-6 polyps, mix of tubular adenoma and hyperplastic     COLONOSCOPY  04/02/2019    [facility]-hyperplastic polyp x 1--repeat 5 years     HYSTERECTOMY TOTAL ABDOMINAL, BILATERAL SALPINGO-OOPHORECTOMY, COMBINED         Medical Conditions:   Past Medical History:   Diagnosis Date     Anxiety      Depression      DM (diabetes mellitus) (H)      High cholesterol 09/17/2019     Hypertension      Sleep apnea      Stroke (H)      Type 2 diabetes mellitus (H) 10/23/2008       Medications:   Current Outpatient Medications   Medication Sig     aspirin 81 MG EC tablet Take 81 mg by mouth     atorvastatin (LIPITOR) 80 MG tablet Take 80 mg by mouth     Blood Glucose Monitoring Suppl (FIFTY50 GLUCOSE METER 2.0) w/Device KIT Dispense meter, test strips, lancets covered by pt ins. E11.9 IDDM type II - Test 3 times/day.     clindamycin (CLINDAMAX) 1 % external gel      clopidogrel (PLAVIX) 75 MG tablet Take 75 mg by mouth     escitalopram (LEXAPRO) 10 MG tablet Take 10 mg by mouth     fluocinonide (LIDEX) 0.05 % external solution APPLY TOPICALLY AS NEEDED     hydrochlorothiazide (HYDRODIURIL) 25 MG tablet Take 25 mg by mouth     hydrOXYzine (ATARAX) 25 MG tablet TAKE 1 TABLET(25 MG) BY MOUTH EVERY NIGHT AS NEEDED FOR ANXIETY     ibuprofen (ADVIL/MOTRIN) 200 MG tablet Take 200 mg by mouth     insulin aspart (NOVOLOG FLEXPEN) 100 UNIT/ML pen INJECT 6 UNITS UNDER THE SKIN WITH BREAKFAST, 4 UNITS WITH LUNCH AND 3 UNITS WITH DINNER     insulin aspart (NOVOLOG VIAL) 100 UNITS/ML vial      insulin glargine (LANTUS SOLOSTAR) 100 UNIT/ML pen Inject 32 Units Subcutaneous     insulin glargine (LANTUS VIAL) 100 UNIT/ML vial      losartan (COZAAR) 100 MG tablet TAKE 1 TABLET BY MOUTH DAILY     metoprolol succinate ER (TOPROL-XL) 25 MG 24 hr tablet 25 mg     nicotine (NICOTROL) 10 MG inhaler      pantoprazole (PROTONIX) 40 MG EC tablet  Take 1 tablet (40 mg) by mouth daily     zolpidem (AMBIEN) 5 MG tablet Take tablet by mouth 15 minutes prior to sleep, for Sleep Study     No current facility-administered medications for this visit.        Are you currently having any of the following symptoms?   General:   Obvious weight gain or loss YES  Fever, chills or sweats NO  Drug allergies: PCN    Eyes:   Changes in vision   Blind spots   Double vision   Other     Ear, Nose and Throat:   Ear pain YES  Sore throat NO  Sinus pain NO  Post-nasal drip NO  Runny nose NO  Bloody nose NO    Heart:   Rapid or irregular heart beat YES  Chest pain or pressure NO  Out of breath when lying down YES  Swelling in feet or legs YES  High blood pressure YES  Heart disease YES    Nervous system   Headaches YES  Weakness in arms or legs YES  Numbness in arms of legs YES  Other:     Skin  Rashes NO  New moles or skin changes NO  Other     Lungs  Shortness of breath at rest YES  Shortness of breath with activity NO  Dry cough YES  Coughing up mucous or phlegm YES  Coughing up blood   Wheezing when breathing YES    Lymph System  Swollen lymph nodes YES  New lumps or bumps YES  Changes in breasts or discharge     Digestive System   Nausea or vomiting NO  Loose or watery stools NO  Hard, dry stools (constipation) NO  Fat or grease in stools NO  Blood in stools NO  Stools are black or bloody NO  Abdominal (belly) pain NO    Urinary Tract   Pain when you urinate (pee) NO  Blood in your urine NO  Urinate (pee) more than normal NO  Irregular periods NO    Muscles and bones   Muscle pain YES  Joint or bone pain   Swollen joints NO  Other     Glands  Increased thirst or urination YES  Diabetes YES  Morning glucose:   Afternoon glucose:     Mental Health  Depression NO  Anxiety NO  Other mental health issues:

## 2022-11-16 NOTE — PROGRESS NOTES
Unable to reach pt, left message regarding H&P outdated 2 days. Called Dr. Valentin Wallace office, no answer and unable to  leave message.  Bibi Smith

## 2022-11-17 ENCOUNTER — TELEPHONE (OUTPATIENT)
Dept: ENT CLINIC | Age: 52
End: 2022-11-17

## 2022-11-17 ENCOUNTER — ANESTHESIA EVENT (OUTPATIENT)
Dept: OPERATING ROOM | Age: 52
End: 2022-11-17
Payer: COMMERCIAL

## 2022-11-18 ENCOUNTER — ANESTHESIA (OUTPATIENT)
Dept: OPERATING ROOM | Age: 52
End: 2022-11-18
Payer: COMMERCIAL

## 2022-11-18 ENCOUNTER — HOSPITAL ENCOUNTER (OUTPATIENT)
Age: 52
Setting detail: OUTPATIENT SURGERY
Discharge: HOME | End: 2022-11-18
Attending: OTOLARYNGOLOGY | Admitting: OTOLARYNGOLOGY
Payer: COMMERCIAL

## 2022-11-18 VITALS
RESPIRATION RATE: 18 BRPM | OXYGEN SATURATION: 96 % | WEIGHT: 315 LBS | BODY MASS INDEX: 45.1 KG/M2 | HEIGHT: 70 IN | TEMPERATURE: 98 F | DIASTOLIC BLOOD PRESSURE: 84 MMHG | SYSTOLIC BLOOD PRESSURE: 147 MMHG | HEART RATE: 89 BPM

## 2022-11-18 DIAGNOSIS — J34.3 NASAL TURBINATE HYPERTROPHY: ICD-10-CM

## 2022-11-18 DIAGNOSIS — J34.89 NASAL OBSTRUCTION: Primary | ICD-10-CM

## 2022-11-18 DIAGNOSIS — J34.2 DEVIATED SEPTUM: ICD-10-CM

## 2022-11-18 PROCEDURE — 2709999900 HC NON-CHARGEABLE SUPPLY: Performed by: OTOLARYNGOLOGY

## 2022-11-18 PROCEDURE — 2500000003 HC RX 250 WO HCPCS: Performed by: OTOLARYNGOLOGY

## 2022-11-18 PROCEDURE — 2580000003 HC RX 258: Performed by: OTOLARYNGOLOGY

## 2022-11-18 PROCEDURE — 7100000001 HC PACU RECOVERY - ADDTL 15 MIN: Performed by: OTOLARYNGOLOGY

## 2022-11-18 PROCEDURE — 3600000004 HC SURGERY LEVEL 4 BASE: Performed by: OTOLARYNGOLOGY

## 2022-11-18 PROCEDURE — 2580000003 HC RX 258: Performed by: NURSE ANESTHETIST, CERTIFIED REGISTERED

## 2022-11-18 PROCEDURE — 2580000003 HC RX 258: Performed by: ANESTHESIOLOGY

## 2022-11-18 PROCEDURE — 30140 RESECT INFERIOR TURBINATE: CPT | Performed by: OTOLARYNGOLOGY

## 2022-11-18 PROCEDURE — 7100000000 HC PACU RECOVERY - FIRST 15 MIN: Performed by: OTOLARYNGOLOGY

## 2022-11-18 PROCEDURE — 3700000001 HC ADD 15 MINUTES (ANESTHESIA): Performed by: OTOLARYNGOLOGY

## 2022-11-18 PROCEDURE — A4217 STERILE WATER/SALINE, 500 ML: HCPCS | Performed by: OTOLARYNGOLOGY

## 2022-11-18 PROCEDURE — 6360000002 HC RX W HCPCS: Performed by: ANESTHESIOLOGY

## 2022-11-18 PROCEDURE — 2500000003 HC RX 250 WO HCPCS

## 2022-11-18 PROCEDURE — 30520 REPAIR OF NASAL SEPTUM: CPT | Performed by: OTOLARYNGOLOGY

## 2022-11-18 PROCEDURE — 6360000002 HC RX W HCPCS

## 2022-11-18 PROCEDURE — 6370000000 HC RX 637 (ALT 250 FOR IP): Performed by: OTOLARYNGOLOGY

## 2022-11-18 PROCEDURE — 3700000000 HC ANESTHESIA ATTENDED CARE: Performed by: OTOLARYNGOLOGY

## 2022-11-18 PROCEDURE — 7100000010 HC PHASE II RECOVERY - FIRST 15 MIN: Performed by: OTOLARYNGOLOGY

## 2022-11-18 PROCEDURE — 3600000014 HC SURGERY LEVEL 4 ADDTL 15MIN: Performed by: OTOLARYNGOLOGY

## 2022-11-18 RX ORDER — LIDOCAINE HYDROCHLORIDE AND EPINEPHRINE 10; 10 MG/ML; UG/ML
INJECTION, SOLUTION INFILTRATION; PERINEURAL PRN
Status: DISCONTINUED | OUTPATIENT
Start: 2022-11-18 | End: 2022-11-18 | Stop reason: HOSPADM

## 2022-11-18 RX ORDER — SODIUM CHLORIDE 0.9 % (FLUSH) 0.9 %
5-40 SYRINGE (ML) INJECTION PRN
Status: DISCONTINUED | OUTPATIENT
Start: 2022-11-18 | End: 2022-11-18 | Stop reason: HOSPADM

## 2022-11-18 RX ORDER — HYDRALAZINE HYDROCHLORIDE 20 MG/ML
10 INJECTION INTRAMUSCULAR; INTRAVENOUS
Status: DISCONTINUED | OUTPATIENT
Start: 2022-11-18 | End: 2022-11-18 | Stop reason: HOSPADM

## 2022-11-18 RX ORDER — DIPHENHYDRAMINE HYDROCHLORIDE 50 MG/ML
12.5 INJECTION INTRAMUSCULAR; INTRAVENOUS
Status: DISCONTINUED | OUTPATIENT
Start: 2022-11-18 | End: 2022-11-18 | Stop reason: HOSPADM

## 2022-11-18 RX ORDER — DEXMEDETOMIDINE HYDROCHLORIDE 100 UG/ML
INJECTION, SOLUTION INTRAVENOUS PRN
Status: DISCONTINUED | OUTPATIENT
Start: 2022-11-18 | End: 2022-11-18 | Stop reason: SDUPTHER

## 2022-11-18 RX ORDER — MAGNESIUM HYDROXIDE 1200 MG/15ML
LIQUID ORAL CONTINUOUS PRN
Status: DISCONTINUED | OUTPATIENT
Start: 2022-11-18 | End: 2022-11-18 | Stop reason: HOSPADM

## 2022-11-18 RX ORDER — ROCURONIUM BROMIDE 10 MG/ML
INJECTION, SOLUTION INTRAVENOUS PRN
Status: DISCONTINUED | OUTPATIENT
Start: 2022-11-18 | End: 2022-11-18 | Stop reason: SDUPTHER

## 2022-11-18 RX ORDER — ONDANSETRON 2 MG/ML
INJECTION INTRAMUSCULAR; INTRAVENOUS PRN
Status: DISCONTINUED | OUTPATIENT
Start: 2022-11-18 | End: 2022-11-18 | Stop reason: SDUPTHER

## 2022-11-18 RX ORDER — SUCCINYLCHOLINE/SOD CL,ISO/PF 200MG/10ML
SYRINGE (ML) INTRAVENOUS PRN
Status: DISCONTINUED | OUTPATIENT
Start: 2022-11-18 | End: 2022-11-18 | Stop reason: SDUPTHER

## 2022-11-18 RX ORDER — DEXAMETHASONE SODIUM PHOSPHATE 4 MG/ML
INJECTION, SOLUTION INTRA-ARTICULAR; INTRALESIONAL; INTRAMUSCULAR; INTRAVENOUS; SOFT TISSUE PRN
Status: DISCONTINUED | OUTPATIENT
Start: 2022-11-18 | End: 2022-11-18 | Stop reason: SDUPTHER

## 2022-11-18 RX ORDER — SODIUM CHLORIDE 0.9 % (FLUSH) 0.9 %
5-40 SYRINGE (ML) INJECTION EVERY 12 HOURS SCHEDULED
Status: DISCONTINUED | OUTPATIENT
Start: 2022-11-18 | End: 2022-11-18 | Stop reason: HOSPADM

## 2022-11-18 RX ORDER — FENTANYL CITRATE 50 UG/ML
INJECTION, SOLUTION INTRAMUSCULAR; INTRAVENOUS PRN
Status: DISCONTINUED | OUTPATIENT
Start: 2022-11-18 | End: 2022-11-18 | Stop reason: SDUPTHER

## 2022-11-18 RX ORDER — MEPERIDINE HYDROCHLORIDE 25 MG/ML
12.5 INJECTION INTRAMUSCULAR; INTRAVENOUS; SUBCUTANEOUS AS NEEDED
Status: DISCONTINUED | OUTPATIENT
Start: 2022-11-18 | End: 2022-11-18 | Stop reason: HOSPADM

## 2022-11-18 RX ORDER — ONDANSETRON 2 MG/ML
4 INJECTION INTRAMUSCULAR; INTRAVENOUS
Status: DISCONTINUED | OUTPATIENT
Start: 2022-11-18 | End: 2022-11-18 | Stop reason: HOSPADM

## 2022-11-18 RX ORDER — KETOROLAC TROMETHAMINE 30 MG/ML
30 INJECTION, SOLUTION INTRAMUSCULAR; INTRAVENOUS ONCE
Status: COMPLETED | OUTPATIENT
Start: 2022-11-18 | End: 2022-11-18

## 2022-11-18 RX ORDER — OXYCODONE HYDROCHLORIDE 5 MG/1
5 TABLET ORAL EVERY 6 HOURS PRN
Qty: 10 TABLET | Refills: 0 | Status: SHIPPED | OUTPATIENT
Start: 2022-11-18 | End: 2022-11-28

## 2022-11-18 RX ORDER — SODIUM CHLORIDE 9 MG/ML
INJECTION, SOLUTION INTRAVENOUS PRN
Status: DISCONTINUED | OUTPATIENT
Start: 2022-11-18 | End: 2022-11-18 | Stop reason: HOSPADM

## 2022-11-18 RX ORDER — LIDOCAINE HYDROCHLORIDE 20 MG/ML
INJECTION, SOLUTION INTRAVENOUS PRN
Status: DISCONTINUED | OUTPATIENT
Start: 2022-11-18 | End: 2022-11-18 | Stop reason: SDUPTHER

## 2022-11-18 RX ORDER — PROPOFOL 10 MG/ML
INJECTION, EMULSION INTRAVENOUS PRN
Status: DISCONTINUED | OUTPATIENT
Start: 2022-11-18 | End: 2022-11-18 | Stop reason: SDUPTHER

## 2022-11-18 RX ORDER — SODIUM CHLORIDE, SODIUM LACTATE, POTASSIUM CHLORIDE, CALCIUM CHLORIDE 600; 310; 30; 20 MG/100ML; MG/100ML; MG/100ML; MG/100ML
INJECTION, SOLUTION INTRAVENOUS CONTINUOUS
Status: DISCONTINUED | OUTPATIENT
Start: 2022-11-18 | End: 2022-11-18 | Stop reason: HOSPADM

## 2022-11-18 RX ORDER — MIDAZOLAM HYDROCHLORIDE 1 MG/ML
INJECTION INTRAMUSCULAR; INTRAVENOUS PRN
Status: DISCONTINUED | OUTPATIENT
Start: 2022-11-18 | End: 2022-11-18 | Stop reason: SDUPTHER

## 2022-11-18 RX ORDER — SODIUM CHLORIDE, SODIUM LACTATE, POTASSIUM CHLORIDE, CALCIUM CHLORIDE 600; 310; 30; 20 MG/100ML; MG/100ML; MG/100ML; MG/100ML
INJECTION, SOLUTION INTRAVENOUS CONTINUOUS PRN
Status: DISCONTINUED | OUTPATIENT
Start: 2022-11-18 | End: 2022-11-18 | Stop reason: SDUPTHER

## 2022-11-18 RX ORDER — PROCHLORPERAZINE EDISYLATE 5 MG/ML
5 INJECTION INTRAMUSCULAR; INTRAVENOUS
Status: DISCONTINUED | OUTPATIENT
Start: 2022-11-18 | End: 2022-11-18 | Stop reason: HOSPADM

## 2022-11-18 RX ADMIN — LIDOCAINE HYDROCHLORIDE 100 MG: 20 INJECTION, SOLUTION INTRAVENOUS at 07:33

## 2022-11-18 RX ADMIN — SODIUM CHLORIDE, POTASSIUM CHLORIDE, SODIUM LACTATE AND CALCIUM CHLORIDE: 600; 310; 30; 20 INJECTION, SOLUTION INTRAVENOUS at 06:10

## 2022-11-18 RX ADMIN — PROPOFOL 200 MG: 10 INJECTION, EMULSION INTRAVENOUS at 07:33

## 2022-11-18 RX ADMIN — ONDANSETRON 4 MG: 2 INJECTION INTRAMUSCULAR; INTRAVENOUS at 07:40

## 2022-11-18 RX ADMIN — DEXMEDETOMIDINE HYDROCHLORIDE 6 MCG: 100 INJECTION, SOLUTION INTRAVENOUS at 08:43

## 2022-11-18 RX ADMIN — SODIUM CHLORIDE, SODIUM LACTATE, POTASSIUM CHLORIDE, AND CALCIUM CHLORIDE: .6; .31; .03; .02 INJECTION, SOLUTION INTRAVENOUS at 07:11

## 2022-11-18 RX ADMIN — PROPOFOL 50 MG: 10 INJECTION, EMULSION INTRAVENOUS at 08:06

## 2022-11-18 RX ADMIN — HYDRALAZINE HYDROCHLORIDE 10 MG: 20 INJECTION INTRAMUSCULAR; INTRAVENOUS at 09:34

## 2022-11-18 RX ADMIN — FENTANYL CITRATE 50 MCG: 50 INJECTION, SOLUTION INTRAMUSCULAR; INTRAVENOUS at 08:03

## 2022-11-18 RX ADMIN — ROCURONIUM BROMIDE 45 MG: 10 INJECTION INTRAVENOUS at 07:43

## 2022-11-18 RX ADMIN — KETOROLAC TROMETHAMINE 30 MG: 30 INJECTION, SOLUTION INTRAMUSCULAR at 09:55

## 2022-11-18 RX ADMIN — DEXAMETHASONE SODIUM PHOSPHATE 4 MG: 4 INJECTION, SOLUTION INTRAMUSCULAR; INTRAVENOUS at 07:44

## 2022-11-18 RX ADMIN — SUGAMMADEX 200 MG: 100 INJECTION, SOLUTION INTRAVENOUS at 08:49

## 2022-11-18 RX ADMIN — DEXMEDETOMIDINE HYDROCHLORIDE 6 MCG: 100 INJECTION, SOLUTION INTRAVENOUS at 08:02

## 2022-11-18 RX ADMIN — ROCURONIUM BROMIDE 5 MG: 10 INJECTION INTRAVENOUS at 07:33

## 2022-11-18 RX ADMIN — FENTANYL CITRATE 50 MCG: 50 INJECTION, SOLUTION INTRAMUSCULAR; INTRAVENOUS at 08:52

## 2022-11-18 RX ADMIN — FENTANYL CITRATE 100 MCG: 50 INJECTION, SOLUTION INTRAMUSCULAR; INTRAVENOUS at 07:30

## 2022-11-18 RX ADMIN — MIDAZOLAM HYDROCHLORIDE 2 MG: 2 INJECTION, SOLUTION INTRAMUSCULAR; INTRAVENOUS at 07:25

## 2022-11-18 RX ADMIN — Medication 180 MG: at 07:33

## 2022-11-18 ASSESSMENT — PAIN DESCRIPTION - ORIENTATION: ORIENTATION: RIGHT;LEFT

## 2022-11-18 ASSESSMENT — LIFESTYLE VARIABLES: SMOKING_STATUS: 0

## 2022-11-18 ASSESSMENT — PAIN - FUNCTIONAL ASSESSMENT
PAIN_FUNCTIONAL_ASSESSMENT: 0-10
PAIN_FUNCTIONAL_ASSESSMENT: ACTIVITIES ARE NOT PREVENTED
PAIN_FUNCTIONAL_ASSESSMENT: ACTIVITIES ARE NOT PREVENTED

## 2022-11-18 ASSESSMENT — PAIN DESCRIPTION - DESCRIPTORS
DESCRIPTORS: ACHING;PRESSURE
DESCRIPTORS: BURNING

## 2022-11-18 ASSESSMENT — PAIN DESCRIPTION - LOCATION: LOCATION: NOSE

## 2022-11-18 ASSESSMENT — PAIN SCALES - GENERAL: PAINLEVEL_OUTOF10: 3

## 2022-11-18 NOTE — PROGRESS NOTES
Procedure(s):  BILATERAL INFERIOR TURBINATE REDUCTION AND SEPTOPLASTY    Current Allergies: Other    No results for input(s): POCGLU in the last 72 hours. Admitted to PACU bed 9 from OR. Arrived on a stretcher . Attached to PACU monitoring system. Alarms and parameters set. Report received from anesthesia personnel. OR staff did not report skin issues that were observed while in OR  Pt arrived with an oral airway. Athrombic wraps in place.

## 2022-11-18 NOTE — PROGRESS NOTES
PACU Discharge Note    Current Allergies: Other    Pt meets criteria for discharge to home per Kareem Score and ASPAN standards. Discussed with patient and responsible individual receiving instructions how to measure pain per numerical scale and when to contact doctor if prescribed medications are not helping with post operative pain    Discharge instructions reviewed with patient and family. Both verbalized understanding of instructions. Gave patient and family opportunity to ask questions. All questions reviewed and answered. Documents signed and copy of discharge instructions given. Vitals:    11/18/22 0945   BP: (!) 140/89   Pulse: 88   Resp: 18   Temp:    SpO2: 95%      BP within 20% of pt's admitting BP per KAREEM SCORE      Intake/Output Summary (Last 24 hours) at 11/18/2022 1033  Last data filed at 11/18/2022 0950  Gross per 24 hour   Intake 1000 ml   Output 250 ml   Net 750 ml         Pain assessment:  none  Pain Level: 3    Offered patient opportunity to use restroom prior to discharge.  Patient utilized urinal 2x    Patient discharged to home/self care via wheel chair by transporter/RN with a responsible individual.      11/18/2022 10:33 AM

## 2022-11-18 NOTE — OP NOTE
Operative Note      [facility]  ENT SEPTOPLASTY  OPERATIVE REPORT    Patient Name: Pura Still  YOB: 1970  Medical Record Number:  [id number]  Billing Number:  [id number]  Date of Procedure: [unfilled]  Time: 0730    Pre Operative Diagnoses:   Problem List Items Addressed This Visit    None    Post Operative Diagnoses:    Problem List Items Addressed This Visit    None               Indications: The patient is a pleasant 47year old male with a history of nasal obstruction. Examination in the office revealed a septal deviation to the right and bilateral turbinate hypertrophy. Medical intervention has been unsuccessful. Pre-operative Dx:  1. Deviated nasal septum     2. Turbinate hypertrophy     3. Nasal obstruction  Post-operative Dx:  1. Deviated septum     2. Turbinate hypertrophy     3. Nasal obstruction  Procedure:  1. Septoplasty (77066)     2. Submucosal resection inferior turbinates, bilateral (99987-44)  Surgeon:  1. Mansi Pires  Assistant:  2. none  Anesthesia:  GETA  EBL:   25ml  Specimen:  Septal contents and turbinate consents  Findings:  Deviation to right with bilateral turbinate hypertrophy  Complications  none  Antibiotics  none    Procedure Note: Patient came into the operating room and was placed in a supine position on the operating room table. IV anesthesia was given until a deep plane of anesthesia was obtained. An endotracheal tube was placed without difficulty. The table was turned and 10 cc of 1% lidocaine with 1:100,000 epinephrine was placed into the  inferior turbinates. Afrin soaked pledgets were place. The patient was then prepped and draped in routine fashion. A ny-transfixion incision was made in the left nasal cavity. A sub-perichondrial and sub-periosteal plane was elevated with a caudal elevator to the vomer. Elevation was performed from maxillary crest to 1.5 cm from the dorsal septum.  A vertical incision was made 1.5 cm posterior to the caudal septum with a caudal and a contralateral sub-perichondrial and sub-periosteal plane was elevated with a caudal elevator to the vomer. Deviated cartilage and bone was removed preserving an anterior and superior strut for nasal support. The mucosa was re-approximated with a 4-0 plane gut suture on a sang needle. The hem-itransfixion incision was closed with a 5-0 chromic interrupted stitch. Afrin soaked pledgets were placed for hemostasis. An incision was made in the tip of the inferior turbinate on the left with a protected needle tip Bovie. A sub-periosteal plane was developed along the full length of the medial surface of the turbinate bone. A submucosal reduction was performed with a 2.9 mm turbinate micro-debrider. The turbinate bone was then in-fractured and out-fractured with a caudal elevator. An afrin soaked pledget was placed. An incision was made in the tip of the inferior turbinate on the right with a protected needle tip Bovie. A sub-periosteal plane was developed along the full length of the medial surface of the turbinate bone. A submucosal reduction was performed with a 2.9 mm turbinate micro-debrider. The turbinate bone was then in-fractured and out-fractured with a caudal elevator. An afrin soaked pledget was placed. Pledgets were removed and Roberto splints were placed between the septum and turbinates in each nostril. The splints were secured through the septum with a 3-0 prolene stitch. The patient was then awoken from general anesthesia, extubated, and sent to the post-op care unit in stable condition. I attest that I was present for and performed the key points of the operation myself.      Electronically signed by Skyla Matos MD on 11/18/2022 at 8:48 AM

## 2022-11-18 NOTE — FLOWSHEET NOTE
BP responded well to Hydralazine.  Pt urinated 250ml via urinal. Overhead lights turned on to help arouse pt       11/18/22 0945   Vital Signs   BP (!) 140/89   MAP (Calculated) 106   MAP (mmHg) 104

## 2022-11-18 NOTE — BRIEF OP NOTE
Brief Postoperative Note      Patient: Michael Larose  YOB: 1970  MRN: [id number]    Date of Procedure: 11/18/2022    Pre-Op Diagnosis: Deviated septum [J34.2]  Nasal turbinate hypertrophy [J34.3]  Nasal obstruction [J34.89]    Post-Op Diagnosis: Same       Procedure(s):  BILATERAL INFERIOR TURBINATE REDUCTION AND SEPTOPLASTY    Surgeon(s):  Lelia Desir MD    Assistant:  * No surgical staff found *    Anesthesia: General    Estimated Blood Loss (mL): 25    Complications: None    Specimens:   * No specimens in log *    Implants:  * No implants in log *      Drains: * No LDAs found *    Findings: Deviation right with inferior turbinate hypertrophy    Electronically signed by John Alonso MD on 11/18/2022 at 8:48 AM

## 2022-11-18 NOTE — H&P
Melba Hammer    [id number]      Department of General Surgery    Surgical Services     Pre-operative History and Physical      INDICATION:   Deviated septum [J34.2]  Nasal turbinate hypertrophy [J34.3]  Nasal obstruction [J34.89]    Procedure(s):  BILATERAL INFERIOR TURBINATE REDUCTION AND SEPTOPLASTY    History obtained from: Patient interview and EHR     HISTORY:   The patient is a 47 y.o. male with a past medical and surgical history as delineated below. Patient with c/o chronic nasal obstruction in the setting of deviated septum and nasal turbinate hypertrophy. His symptoms have been unrelieved with medical therapy and the patient presents today for the above procedure. Weight loss/gain:  No  Recent Hx Steroid use: No  Known anesthesia problems:  No  Recent history of abnormal bleeding: No  Remote history of abnormal bleeding:No  Illness Screening: Patient denies fever, chills, worsening cough, or close contact with sick individuals. Past Medical History:        Diagnosis Date    Essential hypertension     History of moderate to severe 2019 novel coronavirus disease (COVID-19) - 11/2020 11/6/2020    Severe obstructive sleep apnea      Past Surgical History:        Procedure Laterality Date    APPENDECTOMY      COLONOSCOPY N/A 01/20/2020    COLONOSCOPY W/ ANESTHESIA performed by Coco Smith MD at [address] [place] [address] ARTHROSCOPY Bilateral        Medications Prior to Admission:   Prior to Admission medications    Medication Sig Start Date End Date Taking?  Authorizing Provider   loratadine (CLARITIN) 10 MG tablet Take 10 mg by mouth daily    Historical Provider, MD   amLODIPine (NORVASC) 10 MG tablet Take 1 tablet by mouth daily 5/26/22 11/18/22  Trinity Roy DO   tamsulosin (FLOMAX) 0.4 MG capsule Take 1 capsule by mouth daily 5/24/22   Trinity Roy DO   Cholecalciferol (VITAMIN D3) 125 MCG (5000 UT) TABS Take by mouth    Historical Provider, MD   aspirin EC 81 MG EC tablet Take 1 tablet by mouth daily 1/31/22   Jennifer Joel,        Allergies: Other    REVIEW OF SYSTEMS:    Constitutional: Negative for chills, fatigue and fever   HENT: Negative for loss of hearing   Eyes: Negative for visual disturbance  Respiratory: Negative for shortness of breath and wheezing    Cardiovascular: Negative for chest pain, palpitations and exertional chest pressure  Gastrointestinal: Negative for constipation, diarrhea, nausea and vomiting   Musculoskeletal: Negative for gait problem, and joint swelling    Skin: Negative for rash and nonhealing wounds   Neurological: Negative for dizziness, syncope, light-headedness    Hematological: Does not bruise/bleed easily   Psychiatric/Behavioral: Negative for agitation    PHYSICAL EXAM:      BP (!) 148/97   Pulse 89   Temp 98.4 °F (36.9 °C) (Temporal)   Resp 16   Ht 5' 10\" (1.778 m)   Wt (!) 339 lb 12.8 oz (154.1 kg)   SpO2 97%   BMI 48.76 kg/m²  I      Eyes:  pupils equal, round and reactive to light and conjunctiva normal    Head/ENT:  Normocephalic, without obvious abnormality, atramatic,     Neck:  Supple, symmetrical, trachea midline, no adenopathy, no tenderness, skin warm and dry. Heart: Regular rate and rhythm    Lungs:  No increased work of breathing, good air exchange, clear to auscultation bilaterally, no crackles or wheezing    Abdomen:  Soft, non-distended, non-tender, no masses palpated    Extremities:  No clubbing, cyanosis, or edema      ASSESSMENT AND PLAN:    1. Patient is a 47 y.o. male with above specified procedure planned. 2.  Access to ancillary services are available per request of the provider.     BERTA Rodriguez - ENEDINA   11/18/2022

## 2022-11-18 NOTE — ANESTHESIA PRE PROCEDURE
Department of Anesthesiology  Preprocedure Note       Name:  Cee De Leon   Age:  47 y.o.  :  1970                                          MRN:  [id number]         Date:  2022      Surgeon: Estuardo Lee):  María Burns MD    Procedure: Procedure(s):  BILATERAL INFERIOR TURBINATE REDUCTION AND SEPTOPLASTY    Medications prior to admission:   Prior to Admission medications    Medication Sig Start Date End Date Taking? Authorizing Provider   loratadine (CLARITIN) 10 MG tablet Take 10 mg by mouth daily    Historical Provider, MD   amLODIPine (NORVASC) 10 MG tablet Take 1 tablet by mouth daily 22  Catherine Roy DO   tamsulosin (FLOMAX) 0.4 MG capsule Take 1 capsule by mouth daily 22   Catherine Roy DO   Cholecalciferol (VITAMIN D3) 125 MCG (5000 UT) TABS Take by mouth    Historical Provider, MD   aspirin EC 81 MG EC tablet Take 1 tablet by mouth daily 22   Nelly Hopper DO       Current medications:    Current Facility-Administered Medications   Medication Dose Route Frequency Provider Last Rate Last Admin    lactated ringers infusion   IntraVENous Continuous Vida Busch  mL/hr at 22 0610 New Bag at 22 0610    sodium chloride flush 0.9 % injection 5-40 mL  5-40 mL IntraVENous 2 times per day Vida Busch MD        sodium chloride flush 0.9 % injection 5-40 mL  5-40 mL IntraVENous PRN Vida Busch MD        0.9 % sodium chloride infusion   IntraVENous PRN Vida Busch MD           Allergies:     Allergies   Allergen Reactions    Other      Ragweed/pollen/dog hair       Problem List:    Patient Active Problem List   Diagnosis Code    Class 3 severe obesity due to excess calories without serious comorbidity with body mass index (BMI) of 45.0 to 49.9 in adult ([facility] [address] E66.01, Z68.42    Essential hypertension I10    Obstructive sleep apnea syndrome with CPAP G47.33    History of moderate to severe 2019 novel coronavirus disease (COVID-19) - 11/2020 Z86.16    Former cigarette smoker Z87.891    Family history of heart disease Z82.49    Lower urinary tract symptoms (LUTS) - suspected BPH, first discussed 1/2022 R39.9       Past Medical History:        Diagnosis Date    Essential hypertension     History of moderate to severe 2019 novel coronavirus disease (COVID-19) - 11/2020 11/6/2020    Severe obstructive sleep apnea        Past Surgical History:        Procedure Laterality Date    APPENDECTOMY      COLONOSCOPY N/A 01/20/2020    COLONOSCOPY W/ ANESTHESIA performed by Ranjeet Rizvi MD at [address] ARTHROSCOPY Bilateral        Social History:    Social History     Tobacco Use    Smoking status: Some Days     Types: Cigars    Smokeless tobacco: Never    Tobacco comments:     light cigar smoker/ 1 every month or so,   Substance Use Topics    Alcohol use:  Yes     Alcohol/week: 1.0 - 2.0 standard drink     Types: 1 - 2 Shots of liquor per week     Comment: occasional                                Ready to quit: Not Answered  Counseling given: Not Answered  Tobacco comments: light cigar smoker/ 1 every month or so,      Vital Signs (Current):   Vitals:    11/16/22 1541 11/18/22 0552 11/18/22 0632   BP:  (!) 166/105 (!) 148/97   Pulse:  89    Resp:  16    Temp:  98.4 °F (36.9 °C)    TempSrc:  Temporal    SpO2:  97%    Weight: (!) 320 lb (145.2 kg) (!) 339 lb 12.8 oz (154.1 kg)    Height: 5' 10\" (1.778 m) 5' 10\" (1.778 m)                                               BP Readings from Last 3 Encounters:   11/18/22 (!) 148/97   10/17/22 133/82   10/04/22 (!) 137/101       NPO Status: Time of last liquid consumption: 1830                        Time of last solid consumption: 1830                        Date of last liquid consumption: 11/17/22                        Date of last solid food consumption: 11/17/22    BMI:   Wt Readings from Last 3 Encounters:   11/18/22 (!) 339 lb 12.8 oz (154.1 kg)   10/17/22 (!) 332 lb 6.4 oz (150.8 kg)   10/04/22 (!) 336 lb (152.4 kg)     Body mass index is 48.76 kg/m². CBC:   Lab Results   Component Value Date/Time    WBC 5.4 02/21/2022 10:24 AM    RBC 4.81 02/21/2022 10:24 AM    HGB 14.0 02/21/2022 10:24 AM    HCT 41.9 02/21/2022 10:24 AM    MCV 87.0 02/21/2022 10:24 AM    RDW 14.4 02/21/2022 10:24 AM     02/21/2022 10:24 AM       CMP:   Lab Results   Component Value Date/Time     02/21/2022 10:24 AM    K 4.1 02/21/2022 10:24 AM    K 3.6 10/06/2020 04:11 AM     02/21/2022 10:24 AM    CO2 26 02/21/2022 10:24 AM    BUN 13 02/21/2022 10:24 AM    CREATININE 0.9 02/21/2022 10:24 AM    GFRAA >60 02/21/2022 10:24 AM    GFRAA >60 01/27/2012 11:40 AM    AGRATIO 1.6 02/21/2022 10:24 AM    LABGLOM >60 02/21/2022 10:24 AM    GLUCOSE 109 11/11/2020 08:34 AM    PROT 7.0 02/21/2022 10:24 AM    PROT 6.9 01/27/2012 11:40 AM    CALCIUM 9.2 02/21/2022 10:24 AM    BILITOT 0.5 02/21/2022 10:24 AM    ALKPHOS 86 02/21/2022 10:24 AM    AST 11 02/21/2022 10:24 AM    ALT 13 02/21/2022 10:24 AM       POC Tests: No results for input(s): POCGLU, POCNA, POCK, POCCL, POCBUN, POCHEMO, POCHCT in the last 72 hours.     Coags:   Lab Results   Component Value Date/Time    PROTIME 13.2 10/05/2020 07:49 PM    INR 1.14 10/05/2020 07:49 PM    APTT 29.5 10/05/2020 07:49 PM       HCG (If Applicable): No results found for: PREGTESTUR, PREGSERUM, HCG, HCGQUANT     ABGs: No results found for: PHART, PO2ART, ZXN2DCC, PTK8RIH, BEART, T8LFMGUF     Type & Screen (If Applicable):  No results found for: LABABO, LABRH    Drug/Infectious Status (If Applicable):  No results found for: HIV, HEPCAB    COVID-19 Screening (If Applicable):   Lab Results   Component Value Date/Time    COVID19 NOT DETECTED 11/10/2020 01:35 PM           Anesthesia Evaluation  Patient summary reviewed and Nursing notes reviewed no history of anesthetic complications:   Airway: Mallampati: II  TM distance: <3 FB Neck ROM: full  Mouth opening: > = 3 FB   Dental: normal exam         Pulmonary: breath sounds clear to auscultation  (+) sleep apnea: on CPAP,      (-) not a current smoker (never)                           Cardiovascular:  Exercise tolerance: good (>4 METS),   (+) hypertension: moderate,     (-) past MI    NYHA Classification: II    Rhythm: regular  Rate: normal           Beta Blocker:  Not on Beta Blocker         Neuro/Psych:   Negative Neuro/Psych ROS              GI/Hepatic/Renal:   (+) morbid obesity (bmi 48  )     (-) GERD       Endo/Other:                     Abdominal:   (+) obese,           Vascular: negative vascular ROS. Other Findings:           Anesthesia Plan      general     ASA 3       Induction: intravenous. MIPS: Prophylactic antiemetics administered. Anesthetic plan and risks discussed with patient. Plan discussed with CRNA.     Attending anesthesiologist reviewed and agrees with Preprocedure content                Marisa Olvera DO   11/18/2022

## 2022-11-18 NOTE — PROGRESS NOTES
Dr Tristian Matias to bedside. Pt with minimal drainage - guaze dressing placed underneath nostrils. MD states pt expected to take longer to arouse d/t h/o RUBI - wife informed. Pt reports pain is \"low\" he is still drifting in and out of sleep. On RA. BP remains elevated but hydralazine was administered 5 minutes ago.  Will monitor closely

## 2022-11-18 NOTE — ANESTHESIA POSTPROCEDURE EVALUATION
Department of Anesthesiology  Postprocedure Note    Patient: Estee Khanna  MRN: [id number]  YOB: 1970  Date of evaluation: 2022      Procedure Summary     Date: 22 Room / Location: [address] [place] [address] [address] [place] [address] / [facility]    Anesthesia Start: 07 Anesthesia Stop: 9846    Procedure: BILATERAL INFERIOR TURBINATE REDUCTION AND SEPTOPLASTY (Bilateral) Diagnosis:       Deviated septum      Nasal turbinate hypertrophy      Nasal obstruction      (Deviated septum [J34.2])      (Nasal turbinate hypertrophy [J34.3])      (Nasal obstruction [J34.89])    Surgeons: Britt Mccormick MD Responsible Provider: Festus Fuchs DO    Anesthesia Type: general ASA Status: 3          Anesthesia Type: No value filed.     Sukumar Phase I: Sukumar Score: 3    Sukumar Phase II:        Anesthesia Post Evaluation    Patient location during evaluation: PACU  Patient participation: complete - patient participated  Level of consciousness: awake and alert  Pain score: 3  Airway patency: patent  Nausea & Vomiting: no nausea and no vomiting  Complications: no  Cardiovascular status: hemodynamically stable  Respiratory status: acceptable  Hydration status: stable

## 2022-11-18 NOTE — DISCHARGE INSTRUCTIONS
Discharge Instructions for Nasal Airway Surgery     Steps to Take  Home Care   While your sinuses are healing:   Do not blow your nose until your doctor says it is okay to do so. This is usually after 48 hours. You may have bloody discharge from your nose. Create a drip pad using rolls of gauze. Hold the roll under your nose to soak up any discharge. Avoid air pollutants like dust and smoke. Physical Activity   During your recovery:   Avoid swimming in oceans and lakes for 2 weeks. Get plenty of rest for at least a 2-3 days. If your job involves heavy lifting, you may need to stay out of work up to 1 week. Ask your doctor when you will be able to return to work. Do not drive unless your doctor has given you permission to do so. Medications   Your doctor may advise:   Over-the-counter pain medication. Saline spray to moisturize the nose and clear nasal crusting. Two sprays every two hours while awake. Afrin nasal spray. Two sprays each nostril three times a day for three days then stop. Hold aspirin for 5 days    Follow these general medication guidelines:   Take your medication as directed. Do not change the amount or schedule. Tylenol 1000 mg (two 500 mg tablets) by mouth every 6 hours for pain. Ibuprofen (Advil or Motrin) 600 mg (three 200 mg tablets) by mouth every 6 hours for pain. Alternate these two medications every three hours. Oxycodone, 5 mg tablets. Take one tablet every 6 hours as needed for pain. Ask what side effects could occur. Report them to your doctor. Talk to your doctor before you stop taking the medication. Do not share your medication with anyone. Medications can be dangerous when mixed. Talk to your doctor if you are taking more than one medication, including over-the-counter products and supplements. If you had to stop medications before surgery, ask your doctor when you can start again. Follow-up  You will need to be seen by your doctor 1week after surgery.  If you had splints put in your nose during surgery, they will be taken out at your follow-up visit. It is important to go to any recommended appointments. Call Your Doctor If Any of the Following Occur ([phone])  Contact your doctor if your recovery is not progressing as expected or you develop complications, such as:  Signs of infection, including fever and chills  Pain that you cannot control with the medications that you have been given  Redness, swelling, increasing pain, excessive bleeding, or discharge from the nose  Lightheadedness  Nausea and vomiting  Vomiting blood or material that looks like coffee grounds  Bruising around the eye(s)  Swelling of the eye(s)  Changes in vision  A headache that lasts longer than 2 days after surgery    If you think you have an emergency, call for medical help right away.

## 2022-11-22 ENCOUNTER — OFFICE VISIT (OUTPATIENT)
Dept: ENT CLINIC | Age: 52
End: 2022-11-22

## 2022-11-22 DIAGNOSIS — Z48.89 POSTOPERATIVE VISIT: Primary | ICD-10-CM

## 2022-11-22 PROCEDURE — 99024 POSTOP FOLLOW-UP VISIT: CPT | Performed by: OTOLARYNGOLOGY

## 2022-11-22 NOTE — PROGRESS NOTES
Status post septoplasty for nasal obstruction. Healing well with excellent airway. No evidence of hematoma or perforation. Continue saline rinses. Follow up in 4 weeks.

## 2022-12-22 ENCOUNTER — OFFICE VISIT (OUTPATIENT)
Dept: ENT CLINIC | Age: 52
End: 2022-12-22

## 2022-12-22 VITALS
HEART RATE: 80 BPM | DIASTOLIC BLOOD PRESSURE: 98 MMHG | WEIGHT: 315 LBS | HEIGHT: 70 IN | TEMPERATURE: 97.2 F | SYSTOLIC BLOOD PRESSURE: 138 MMHG | BODY MASS INDEX: 45.1 KG/M2

## 2022-12-22 DIAGNOSIS — Z48.89 POSTOPERATIVE VISIT: Primary | ICD-10-CM

## 2022-12-22 DIAGNOSIS — R09.81 NASAL CONGESTION: ICD-10-CM

## 2022-12-22 PROCEDURE — 99024 POSTOP FOLLOW-UP VISIT: CPT | Performed by: OTOLARYNGOLOGY

## 2022-12-22 RX ORDER — AZELASTINE 1 MG/ML
2 SPRAY, METERED NASAL 2 TIMES DAILY
Qty: 30 ML | Refills: 2 | Status: SHIPPED | OUTPATIENT
Start: 2022-12-22 | End: 2023-12-22

## 2022-12-22 NOTE — PROGRESS NOTES
Status post septoplasty for nasal obstruction. Healing well with excellent airway. No evidence of hematoma or perforation. Continue saline rinses. Follow up as needed.

## 2023-04-23 ENCOUNTER — PATIENT MESSAGE (OUTPATIENT)
Dept: PRIMARY CARE CLINIC | Age: 53
End: 2023-04-23

## 2023-04-23 DIAGNOSIS — R09.81 NASAL CONGESTION: ICD-10-CM

## 2023-04-24 RX ORDER — LORATADINE 10 MG/1
10 TABLET ORAL DAILY
Qty: 30 TABLET | Refills: 11 | Status: SHIPPED | OUTPATIENT
Start: 2023-04-24

## 2023-04-24 NOTE — TELEPHONE ENCOUNTER
Patient mentioned Rx for Claritin D,  on med list there is plain Claritin.   LAST SEEN       NEXT APPOINTMENT           10.17.22  none

## 2023-04-24 NOTE — TELEPHONE ENCOUNTER
From: Pura Still  To: Dr. Eva Dave  Sent: 4/23/2023 7:51 PM EDT  Subject: Claritin D     Is it possible to get a monthly prescription written for Claritin D? The pharmacist suggested going the route of getting a prescription so I could use my insurance HSA card.

## 2023-05-17 NOTE — TELEPHONE ENCOUNTER
Called and left Vm and call back number for patient to call and get scheduled for physical and medication can be sent by provider

## 2023-05-18 NOTE — TELEPHONE ENCOUNTER
Patient has scheduled his physical, Would also like a order placed for his labs, so he can complete them prior to appointment. Please call patient once placed.

## 2023-05-18 NOTE — TELEPHONE ENCOUNTER
Left message on patient's voice mail to call to schedule a physical.  Then the refill can go back to Dr Adria Bardales.

## 2023-05-20 RX ORDER — AMLODIPINE BESYLATE 10 MG/1
10 TABLET ORAL DAILY
Qty: 90 TABLET | Refills: 3 | Status: SHIPPED | OUTPATIENT
Start: 2023-05-20 | End: 2023-06-19

## 2023-07-06 ASSESSMENT — PATIENT HEALTH QUESTIONNAIRE - PHQ9
2. FEELING DOWN, DEPRESSED OR HOPELESS: 0
SUM OF ALL RESPONSES TO PHQ QUESTIONS 1-9: 0
SUM OF ALL RESPONSES TO PHQ9 QUESTIONS 1 & 2: 0
2. FEELING DOWN, DEPRESSED OR HOPELESS: NOT AT ALL
1. LITTLE INTEREST OR PLEASURE IN DOING THINGS: NOT AT ALL
SUM OF ALL RESPONSES TO PHQ QUESTIONS 1-9: 0
SUM OF ALL RESPONSES TO PHQ9 QUESTIONS 1 & 2: 0
SUM OF ALL RESPONSES TO PHQ QUESTIONS 1-9: 0
1. LITTLE INTEREST OR PLEASURE IN DOING THINGS: 0
SUM OF ALL RESPONSES TO PHQ QUESTIONS 1-9: 0

## 2023-07-07 ENCOUNTER — OFFICE VISIT (OUTPATIENT)
Dept: PRIMARY CARE CLINIC | Age: 53
End: 2023-07-07
Payer: COMMERCIAL

## 2023-07-07 ENCOUNTER — TELEPHONE (OUTPATIENT)
Dept: PRIMARY CARE CLINIC | Age: 53
End: 2023-07-07

## 2023-07-07 VITALS
OXYGEN SATURATION: 97 % | DIASTOLIC BLOOD PRESSURE: 86 MMHG | WEIGHT: 315 LBS | SYSTOLIC BLOOD PRESSURE: 134 MMHG | HEART RATE: 69 BPM | BODY MASS INDEX: 45.1 KG/M2 | TEMPERATURE: 98.2 F | HEIGHT: 70 IN

## 2023-07-07 DIAGNOSIS — Z00.00 ANNUAL PHYSICAL EXAM: Primary | ICD-10-CM

## 2023-07-07 DIAGNOSIS — Z00.00 ANNUAL PHYSICAL EXAM: ICD-10-CM

## 2023-07-07 DIAGNOSIS — I10 ESSENTIAL HYPERTENSION: ICD-10-CM

## 2023-07-07 DIAGNOSIS — J30.89 SEASONAL ALLERGIC RHINITIS DUE TO OTHER ALLERGIC TRIGGER: ICD-10-CM

## 2023-07-07 DIAGNOSIS — Z12.5 SCREENING FOR PROSTATE CANCER: ICD-10-CM

## 2023-07-07 DIAGNOSIS — G47.33 OBSTRUCTIVE SLEEP APNEA SYNDROME: ICD-10-CM

## 2023-07-07 DIAGNOSIS — R39.9 LOWER URINARY TRACT SYMPTOMS (LUTS): ICD-10-CM

## 2023-07-07 DIAGNOSIS — Z87.891 FORMER CIGARETTE SMOKER: ICD-10-CM

## 2023-07-07 PROBLEM — J30.9 ALLERGIC RHINITIS DUE TO ALLERGEN: Status: ACTIVE | Noted: 2023-07-07

## 2023-07-07 LAB
BASOPHILS # BLD: 0.1 K/UL (ref 0–0.2)
BASOPHILS NFR BLD: 1.2 %
DEPRECATED RDW RBC AUTO: 14.7 % (ref 12.4–15.4)
EOSINOPHIL # BLD: 0.2 K/UL (ref 0–0.6)
EOSINOPHIL NFR BLD: 4.3 %
HCT VFR BLD AUTO: 39.9 % (ref 40.5–52.5)
HGB BLD-MCNC: 13.8 G/DL (ref 13.5–17.5)
LYMPHOCYTES # BLD: 1.5 K/UL (ref 1–5.1)
LYMPHOCYTES NFR BLD: 32.9 %
MCH RBC QN AUTO: 30.3 PG (ref 26–34)
MCHC RBC AUTO-ENTMCNC: 34.5 G/DL (ref 31–36)
MCV RBC AUTO: 87.8 FL (ref 80–100)
MONOCYTES # BLD: 0.4 K/UL (ref 0–1.3)
MONOCYTES NFR BLD: 9.5 %
NEUTROPHILS # BLD: 2.4 K/UL (ref 1.7–7.7)
NEUTROPHILS NFR BLD: 52.1 %
PLATELET # BLD AUTO: 248 K/UL (ref 135–450)
PMV BLD AUTO: 9.2 FL (ref 5–10.5)
RBC # BLD AUTO: 4.55 M/UL (ref 4.2–5.9)
WBC # BLD AUTO: 4.5 K/UL (ref 4–11)

## 2023-07-07 PROCEDURE — 3075F SYST BP GE 130 - 139MM HG: CPT | Performed by: FAMILY MEDICINE

## 2023-07-07 PROCEDURE — 3079F DIAST BP 80-89 MM HG: CPT | Performed by: FAMILY MEDICINE

## 2023-07-07 PROCEDURE — 99396 PREV VISIT EST AGE 40-64: CPT | Performed by: FAMILY MEDICINE

## 2023-07-07 RX ORDER — AZELASTINE 1 MG/ML
2 SPRAY, METERED NASAL 2 TIMES DAILY PRN
Qty: 30 ML | Refills: 2
Start: 2023-07-07 | End: 2024-07-06

## 2023-07-07 RX ORDER — LORATADINE 10 MG/1
10 TABLET ORAL DAILY PRN
Qty: 30 TABLET | Refills: 11
Start: 2023-07-07

## 2023-07-07 SDOH — ECONOMIC STABILITY: INCOME INSECURITY: HOW HARD IS IT FOR YOU TO PAY FOR THE VERY BASICS LIKE FOOD, HOUSING, MEDICAL CARE, AND HEATING?: NOT HARD AT ALL

## 2023-07-07 SDOH — ECONOMIC STABILITY: FOOD INSECURITY: WITHIN THE PAST 12 MONTHS, YOU WORRIED THAT YOUR FOOD WOULD RUN OUT BEFORE YOU GOT MONEY TO BUY MORE.: NEVER TRUE

## 2023-07-07 SDOH — ECONOMIC STABILITY: HOUSING INSECURITY
IN THE LAST 12 MONTHS, WAS THERE A TIME WHEN YOU DID NOT HAVE A STEADY PLACE TO SLEEP OR SLEPT IN A SHELTER (INCLUDING NOW)?: NO

## 2023-07-07 SDOH — ECONOMIC STABILITY: FOOD INSECURITY: WITHIN THE PAST 12 MONTHS, THE FOOD YOU BOUGHT JUST DIDN'T LAST AND YOU DIDN'T HAVE MONEY TO GET MORE.: NEVER TRUE

## 2023-07-07 ASSESSMENT — ENCOUNTER SYMPTOMS
CONSTIPATION: 0
SORE THROAT: 0
ABDOMINAL PAIN: 0
EYE ITCHING: 0
DIARRHEA: 0
WHEEZING: 0
VOMITING: 0
EYE PAIN: 0
NAUSEA: 0
COUGH: 0
SHORTNESS OF BREATH: 0

## 2023-07-07 NOTE — PROGRESS NOTES
Assessment:  Encounter Diagnoses   Name Primary? Annual physical exam Yes    Screening for prostate cancer     Body mass index (BMI) 45.0-49.9, adult [facility])     Former cigarette smoker     Essential hypertension     Lower urinary tract symptoms (LUTS) - suspected BPH, first discussed 1/2022     Obstructive sleep apnea syndrome with CPAP     Seasonal allergic rhinitis due to other allergic trigger        Plan:  1. Annual physical exam  General wellness exam. Reviewed chart for past hx and updated today. Counseled on age appropriate health guidance and discussed screening recommendations. Vaccinations reviewed and discussed. All questions answered  - CBC with Auto Differential; Future  - Comprehensive Metabolic Panel, Fasting; Future  - Lipid, Fasting; Future  - Hemoglobin A1C; Future  - TSH with Reflex; Future    2. Screening for prostate cancer  Discussed prostate cancer screening with male of appropriate age and risk factors. I have provided evidence behind PSA and answered all questions regarding the sensitivity of this test.  At this time, through shared decision making, patient would like to move forward with collection of annual PSA.  - PSA Screening; Future    3. Body mass index (BMI) 45.0-49.9, adult [facility])  Patient was asked about his current diet and exercise habits, and personalized advice was provided regarding recommended lifestyle changes. Patient's comorbid health conditions associated with elevated BMI were discussed, including hypertension, as well as the likely benefits of weight loss. Based upon patient's motivation to change his behavior, the following plan was agreed upon to work toward a weight loss goal of 100+ pounds: low carbohydrate diet and comprehensive weight management program referral- given today . Educational materials for  weight loss were provided. Patient will follow-up in 1 year(s) with PCP. Provider spent 5 minutes counseling patient.   - [facility]

## 2023-07-07 NOTE — PATIENT INSTRUCTIONS
[address] [facility] Laboratory Locations - No appointment necessary. ? indicates the location is open Saturdays in addition to Monday through Friday. Call your preferred location for test preparation, business hours and other information you need. [employer] accepts [employer]. [facility]ENTR[facility]  [place]  [employer]    ? [place] [id number]. [address]. [address] [facility] [place], [address]    Ph: [address] [facility], [address]    Ph: [phone]   ? [address]    [place], [address] [facility]    Ph: [address], [address] [address]    Ph: [phone] ? [place]   [address]   Ph: [phone]  ? [address] [facility], [address] [facility]    Ph: [place] [address] [facility], [address] [facility]   Ph: [phone]    NORTH    ? [place], [place] [address]    Ph: [phone]  [facility]   [address] [facility], [facility]   Ph: Arron Harding. [place], [address] [facility]: [address], [address] [place] [address]    Ph: [address] [facility].  [employer] [place], [place] [address]    Ph: [phone]

## 2023-07-08 LAB
ALBUMIN SERPL-MCNC: 4.4 G/DL (ref 3.4–5)
ALBUMIN/GLOB SERPL: 1.6 {RATIO} (ref 1.1–2.2)
ALP SERPL-CCNC: 91 U/L (ref 40–129)
ALT SERPL-CCNC: 21 U/L (ref 10–40)
ANION GAP SERPL CALCULATED.3IONS-SCNC: 9 MMOL/L (ref 3–16)
AST SERPL-CCNC: 15 U/L (ref 15–37)
BILIRUB SERPL-MCNC: 0.6 MG/DL (ref 0–1)
BUN SERPL-MCNC: 15 MG/DL (ref 7–20)
CALCIUM SERPL-MCNC: 9.3 MG/DL (ref 8.3–10.6)
CHLORIDE SERPL-SCNC: 107 MMOL/L (ref 99–110)
CHOLEST SERPL-MCNC: 192 MG/DL (ref 0–199)
CO2 SERPL-SCNC: 28 MMOL/L (ref 21–32)
CREAT SERPL-MCNC: 1 MG/DL (ref 0.9–1.3)
EST. AVERAGE GLUCOSE BLD GHB EST-MCNC: 114 MG/DL
GFR SERPLBLD CREATININE-BSD FMLA CKD-EPI: >60 ML/MIN/{1.73_M2}
GLUCOSE P FAST SERPL-MCNC: 96 MG/DL (ref 70–99)
HBA1C MFR BLD: 5.6 %
HDLC SERPL-MCNC: 40 MG/DL (ref 40–60)
LDL CHOLESTEROL CALCULATED: 138 MG/DL
POTASSIUM SERPL-SCNC: 4.5 MMOL/L (ref 3.5–5.1)
PROT SERPL-MCNC: 7.1 G/DL (ref 6.4–8.2)
PSA SERPL DL<=0.01 NG/ML-MCNC: 0.54 NG/ML (ref 0–4)
SODIUM SERPL-SCNC: 144 MMOL/L (ref 136–145)
TRIGL SERPL-MCNC: 69 MG/DL (ref 0–150)
TSH SERPL DL<=0.005 MIU/L-ACNC: 1.53 UIU/ML (ref 0.27–4.2)
VLDLC SERPL CALC-MCNC: 14 MG/DL

## 2023-08-01 ENCOUNTER — TELEPHONE (OUTPATIENT)
Dept: BARIATRICS/WEIGHT MGMT | Age: 53
End: 2023-08-01

## 2023-08-01 NOTE — TELEPHONE ENCOUNTER
Patient was sent Dr. Precious Edwards digital bariatric seminar. Patient DOES have BWLS coverage with [place] (No specified diet) Bariatric benefit form scanned in media. *Spoke with patient, Info given  Per pt: no hx of wls.  Bmi>35  Pk mailed

## 2023-09-19 ENCOUNTER — TELEPHONE (OUTPATIENT)
Dept: BARIATRICS/WEIGHT MGMT | Age: 53
End: 2023-09-19

## 2023-09-21 ENCOUNTER — OFFICE VISIT (OUTPATIENT)
Dept: BARIATRICS/WEIGHT MGMT | Age: 53
End: 2023-09-21
Payer: COMMERCIAL

## 2023-09-21 VITALS
SYSTOLIC BLOOD PRESSURE: 130 MMHG | WEIGHT: 315 LBS | DIASTOLIC BLOOD PRESSURE: 78 MMHG | HEIGHT: 70 IN | HEART RATE: 85 BPM | BODY MASS INDEX: 45.1 KG/M2 | OXYGEN SATURATION: 98 % | RESPIRATION RATE: 18 BRPM

## 2023-09-21 DIAGNOSIS — G47.33 OBSTRUCTIVE SLEEP APNEA SYNDROME: ICD-10-CM

## 2023-09-21 DIAGNOSIS — E66.01 MORBID OBESITY WITH BMI OF 45.0-49.9, ADULT (HCC): Primary | ICD-10-CM

## 2023-09-21 DIAGNOSIS — I10 ESSENTIAL HYPERTENSION: ICD-10-CM

## 2023-09-21 PROCEDURE — 3075F SYST BP GE 130 - 139MM HG: CPT | Performed by: SURGERY

## 2023-09-21 PROCEDURE — 3078F DIAST BP <80 MM HG: CPT | Performed by: SURGERY

## 2023-09-21 PROCEDURE — 99205 OFFICE O/P NEW HI 60 MIN: CPT | Performed by: SURGERY

## 2023-09-21 RX ORDER — BIOTIN 1 MG
TABLET ORAL
COMMUNITY

## 2023-09-21 RX ORDER — MIRABEGRON 50 MG/1
50 TABLET, FILM COATED, EXTENDED RELEASE ORAL DAILY
COMMUNITY
Start: 2023-09-17

## 2023-09-21 NOTE — PATIENT INSTRUCTIONS
Patient received dietary handouts and education. Goals:   -Eat 4-5 times daily  -Avoid high fat and high sugar foods  -Include protein with all meals and snacks  -Avoid carbonation and caffeine  -Avoid calorie containing beverages  -Increase physical activity as tolerated      -Plan for Laparoscopic sleeve gastrectomy      Pre-operative work up Ordered:    - F/U in 4 weeks. - Nutrition Labs. - Protein Shake Trial.  - Psych Evaluation.   - Cardiac Clearance. - CPAP Compliance. - EGD (endoscopy to check your stomach). - Support Group Attendance. - Obtain letter of medical necessity (PCP Letter). - Quit Smoking,  Alcohol, Caffeine and Carbonated Drinks  - Obtain records for Weight History 2 yrs. - Start Regular Exercise and track your activities. - Start Tracking your food Intake and follow dietary guidelines. Patient advised that its their responsibility to follow up for studies, referrals and/or labs ordered today.

## 2023-10-19 ENCOUNTER — OFFICE VISIT (OUTPATIENT)
Dept: BARIATRICS/WEIGHT MGMT | Age: 53
End: 2023-10-19
Payer: COMMERCIAL

## 2023-10-19 VITALS
WEIGHT: 315 LBS | RESPIRATION RATE: 18 BRPM | HEART RATE: 72 BPM | DIASTOLIC BLOOD PRESSURE: 76 MMHG | SYSTOLIC BLOOD PRESSURE: 128 MMHG | BODY MASS INDEX: 45.1 KG/M2 | OXYGEN SATURATION: 98 % | HEIGHT: 70 IN

## 2023-10-19 DIAGNOSIS — E66.01 MORBID OBESITY WITH BMI OF 45.0-49.9, ADULT (HCC): Primary | ICD-10-CM

## 2023-10-19 DIAGNOSIS — I10 ESSENTIAL HYPERTENSION: ICD-10-CM

## 2023-10-19 DIAGNOSIS — K21.9 CHRONIC GERD: ICD-10-CM

## 2023-10-19 DIAGNOSIS — G47.33 OBSTRUCTIVE SLEEP APNEA SYNDROME: ICD-10-CM

## 2023-10-19 PROCEDURE — 3078F DIAST BP <80 MM HG: CPT | Performed by: SURGERY

## 2023-10-19 PROCEDURE — 3074F SYST BP LT 130 MM HG: CPT | Performed by: SURGERY

## 2023-10-19 PROCEDURE — 99214 OFFICE O/P EST MOD 30 MIN: CPT | Performed by: SURGERY

## 2023-10-19 NOTE — PATIENT INSTRUCTIONS
Patient received dietary handouts and education.     Plan/Recommendations:   - Focus on lean protein 4x/day  - Eliminate caffeine & sugary creamer  - Try protein powder

## 2023-10-30 NOTE — PROGRESS NOTES
02/21/22 10:24 07/07/23 12:18   Albumin 3.4 - 5.0 g/dL 4.3 4.3 4.4   Albumin/Globulin Ratio 1.1 - 2.2   1.6 1.6   Alk Phos 40 - 129 U/L 83 86 91   ALT 10 - 40 U/L 30 13 21   AST 15 - 37 U/L 15 11 (L) 15   BILIRUBIN TOTAL 0.0 - 1.0 mg/dL 0.6 0.5 0.6   Bilirubin, Direct 0.0 - 0.3 mg/dL <0.2     Bilirubin, Indirect 0.0 - 1.0 mg/dL see below     Total Protein 6.4 - 8.2 g/dL 6.9 7.0 7.1   Triglycerides 0 - 150 mg/dL 72        Latest Reference Range & Units 07/07/23 12:18   Glucose, Fasting 70 - 99 mg/dL 96   Hemoglobin A1C See comment % 5.6   eAG (mg/dL) mg/dL 114.0      Latest Reference Range & Units 12/02/20 08:05 02/21/22 10:24 07/07/23 12:18   WBC 4.0 - 11.0 K/uL 4.7 5.4 4.5   RBC 4.20 - 5.90 M/uL 4.46 4.81 4.55   Hemoglobin Quant 13.5 - 17.5 g/dL 13.0 (L) 14.0 13.8   Hematocrit 40.5 - 52.5 % 39.4 (L) 41.9 39.9 (L)   MCV 80.0 - 100.0 fL 88.5 87.0 87.8   MCH 26.0 - 34.0 pg 29.1 29.1 30.3   MCHC 31.0 - 36.0 g/dL 32.8 33.5 34.5   MPV 5.0 - 10.5 fL 9.2 8.4 9.2   RDW 12.4 - 15.4 % 15.5 (H) 14.4 14.7   Platelet Count 768 - 450 K/uL 243 280 248     The 10-year ASCVD risk score (Onel VICKERS, et al., 2019) is: 8.1%    Values used to calculate the score:      Age: 47 years      Sex: Male      Is Non- : Yes      Diabetic: No      Tobacco smoker: No      Systolic Blood Pressure: 373 mmHg      Is BP treated: No      HDL Cholesterol: 40 mg/dL      Total Cholesterol: 192 mg/dL    Cardiac Data:   EKG 11/02/23: HR 70  Normal Sinus Rhythm     EKG 10/5/20: HR 83  Normal Sinus Rhythm     No recent cardiac testing    Impression and Plan:      1. Pre-Operative Risk Stratification- Laparoscopic Sleeve Gastrectomy     -ECG today: NSR, no ischemic changes. 2. Essential HTN  3. HLD   4. Mobid Obesity  5. RUBI on CPAP  6. Former Smoker     Plan  The patient is low risk for major adverse cardiac events for Laparoscopic Sleeve Gastrectomy. He was able to perform >4 METS without symptoms.   He is able to walk throughout his

## 2023-11-02 ENCOUNTER — OFFICE VISIT (OUTPATIENT)
Dept: CARDIOLOGY CLINIC | Age: 53
End: 2023-11-02
Payer: COMMERCIAL

## 2023-11-02 VITALS
OXYGEN SATURATION: 94 % | HEART RATE: 74 BPM | HEIGHT: 70 IN | BODY MASS INDEX: 45.1 KG/M2 | WEIGHT: 315 LBS | SYSTOLIC BLOOD PRESSURE: 150 MMHG | DIASTOLIC BLOOD PRESSURE: 80 MMHG

## 2023-11-02 DIAGNOSIS — I10 ESSENTIAL HYPERTENSION: ICD-10-CM

## 2023-11-02 DIAGNOSIS — K21.9 CHRONIC GERD: ICD-10-CM

## 2023-11-02 DIAGNOSIS — Z76.89 ESTABLISHING CARE WITH NEW DOCTOR, ENCOUNTER FOR: Primary | ICD-10-CM

## 2023-11-02 DIAGNOSIS — G47.33 OBSTRUCTIVE SLEEP APNEA SYNDROME: ICD-10-CM

## 2023-11-02 DIAGNOSIS — E66.01 MORBID OBESITY WITH BMI OF 45.0-49.9, ADULT (HCC): ICD-10-CM

## 2023-11-02 DIAGNOSIS — Z82.49 FAMILY HISTORY OF HEART DISEASE: ICD-10-CM

## 2023-11-02 PROCEDURE — 99202 OFFICE O/P NEW SF 15 MIN: CPT | Performed by: STUDENT IN AN ORGANIZED HEALTH CARE EDUCATION/TRAINING PROGRAM

## 2023-11-02 PROCEDURE — 3079F DIAST BP 80-89 MM HG: CPT | Performed by: STUDENT IN AN ORGANIZED HEALTH CARE EDUCATION/TRAINING PROGRAM

## 2023-11-02 PROCEDURE — 3077F SYST BP >= 140 MM HG: CPT | Performed by: STUDENT IN AN ORGANIZED HEALTH CARE EDUCATION/TRAINING PROGRAM

## 2023-11-02 PROCEDURE — 93000 ELECTROCARDIOGRAM COMPLETE: CPT | Performed by: STUDENT IN AN ORGANIZED HEALTH CARE EDUCATION/TRAINING PROGRAM

## 2023-11-02 NOTE — PATIENT INSTRUCTIONS
Plan  The patient is low risk for major adverse cardiac events for Laparoscopic Sleeve Gastrectomy. He was able to perform >4 METS without symptoms. He is able to walk throughout his office getting 10, 000-20,000 steps a day and rides his bike up to two miles without difficulty. May proceed to the operating room at the discretion of the attending surgeon. Please call with any questions or concerns that may arise. Discussed starting a Statin (Atorvastatin, Rosuvastatin, etc.) to lower you cholesterol. Your 10 year risk score for heart attack and stroke risk was 8.1%. Recommend a CT calcium score which is a test used as a screening tool for coronary artery disease. If the score is above 0, then would recommend Aspirin and Statin therapy. This test is considered a screening tool so it is not covered by insurance. You can discuss this with Kevin Haley, DO  Monitor your blood pressure at home twice a day, morning and night. Recommend a monitor for your bicep. Keep a log over the next two week. If you notice trends above goal, 130/80 or less, let your PCP Kevin Haley, DO know.

## 2023-11-16 ENCOUNTER — OFFICE VISIT (OUTPATIENT)
Dept: BARIATRICS/WEIGHT MGMT | Age: 53
End: 2023-11-16
Payer: COMMERCIAL

## 2023-11-16 VITALS
RESPIRATION RATE: 18 BRPM | SYSTOLIC BLOOD PRESSURE: 142 MMHG | HEART RATE: 71 BPM | BODY MASS INDEX: 45.1 KG/M2 | OXYGEN SATURATION: 99 % | WEIGHT: 315 LBS | DIASTOLIC BLOOD PRESSURE: 80 MMHG | HEIGHT: 70 IN

## 2023-11-16 DIAGNOSIS — K21.9 CHRONIC GERD: ICD-10-CM

## 2023-11-16 DIAGNOSIS — I10 ESSENTIAL HYPERTENSION: ICD-10-CM

## 2023-11-16 DIAGNOSIS — E66.01 MORBID OBESITY WITH BMI OF 45.0-49.9, ADULT (HCC): Primary | ICD-10-CM

## 2023-11-16 DIAGNOSIS — G47.33 OBSTRUCTIVE SLEEP APNEA SYNDROME: ICD-10-CM

## 2023-11-16 PROCEDURE — 99214 OFFICE O/P EST MOD 30 MIN: CPT | Performed by: SURGERY

## 2023-11-16 PROCEDURE — 3079F DIAST BP 80-89 MM HG: CPT | Performed by: SURGERY

## 2023-11-16 PROCEDURE — 3077F SYST BP >= 140 MM HG: CPT | Performed by: SURGERY

## 2023-11-16 NOTE — PROGRESS NOTES
Reviewed pt chart and discussed plan with provider. Will continue to follow.      Breanne Becerra, RD, LD

## 2023-11-27 ENCOUNTER — TELEPHONE (OUTPATIENT)
Dept: BARIATRICS/WEIGHT MGMT | Age: 53
End: 2023-11-27

## 2023-11-28 NOTE — PROGRESS NOTES
Patient reached ____ yes  _____ no   VM instructions left ____ yes   phone number ________                                ____ no-office notified          Date __12/1/23_______  Time ___0745____  Arrival __0600  hosp-endo____    Nothing to eat or drink after midnight-follow your doctors prep instructions-this may include taking a second dose of your prep after midnight  Responsible adult 25 or older to stay on site while you are here-drive you home-stay with you after  Follow any instructions your doctors office has given you  Bring a complete list of all your medications and supplements including name,dose,how often taken the day of your procedure  If you normally take the following medications in the morning please do so the AM of your procedure with a small sip of water       Heart,blood pressure,seizure,thyroid or breathing medications-use your inhalers-bring any rescue inhalers with you DOS       DO NOT take blood pressure medications ending in \"maris\" or \"pril\" the AM of procedure or evening prior  Dr Efren Kumar patients are not to take any medications the AM of surgery  Take half or your normal dose of any long acting insulins the night before your procedure-do not take any diabetic medications the AM of procedure  Follow your doctors instructions regarding stopping or taking  any blood thinners-if you do not have instructions-call them  Any questions call your doctor  Other ______________________________________________________________    Jaspreet Beagle POLICY(subject to change)             The current policy is 2 visitors per patient. There are no children allowed. Mask at discretion of facility. Visiting hours are 8a-8p. Overnight visitors will be at the discretion of the nurse. All policies are subject to change.

## 2023-11-29 ENCOUNTER — OFFICE VISIT (OUTPATIENT)
Dept: PRIMARY CARE CLINIC | Age: 53
End: 2023-11-29
Payer: COMMERCIAL

## 2023-11-29 VITALS
OXYGEN SATURATION: 97 % | HEART RATE: 68 BPM | DIASTOLIC BLOOD PRESSURE: 83 MMHG | SYSTOLIC BLOOD PRESSURE: 135 MMHG | HEIGHT: 69 IN | BODY MASS INDEX: 46.65 KG/M2 | WEIGHT: 315 LBS | TEMPERATURE: 98 F

## 2023-11-29 DIAGNOSIS — I10 ESSENTIAL HYPERTENSION: Primary | ICD-10-CM

## 2023-11-29 DIAGNOSIS — Z01.818 PREOPERATIVE EXAMINATION: ICD-10-CM

## 2023-11-29 DIAGNOSIS — G47.33 OBSTRUCTIVE SLEEP APNEA SYNDROME: ICD-10-CM

## 2023-11-29 DIAGNOSIS — E66.01 MORBID OBESITY WITH BMI OF 45.0-49.9, ADULT (HCC): ICD-10-CM

## 2023-11-29 PROCEDURE — 3078F DIAST BP <80 MM HG: CPT | Performed by: FAMILY MEDICINE

## 2023-11-29 PROCEDURE — 3074F SYST BP LT 130 MM HG: CPT | Performed by: FAMILY MEDICINE

## 2023-11-29 PROCEDURE — 99213 OFFICE O/P EST LOW 20 MIN: CPT | Performed by: FAMILY MEDICINE

## 2023-11-29 RX ORDER — AMLODIPINE BESYLATE 10 MG/1
10 TABLET ORAL DAILY
Qty: 90 TABLET | Refills: 3
Start: 2023-11-29 | End: 2023-12-29

## 2023-11-29 ASSESSMENT — ENCOUNTER SYMPTOMS
SORE THROAT: 0
COUGH: 0
NAUSEA: 0
SHORTNESS OF BREATH: 0
ABDOMINAL PAIN: 0

## 2023-11-29 NOTE — PATIENT INSTRUCTIONS
[facility] Laboratory Locations - No appointment necessary. ? indicates the location is open Saturdays in addition to Monday through Friday. Call your preferred location for test preparation, business hours and other information you need. [employer] accepts [employer]. CENTRAL  EAST  [place]    ? [place] [id number] [facility]. [address] [facility]'[address] [facility],[address], [address] [facility] [address]    Ph: [address] [facility], [address]    Ph: [phone]   ? [address]    Thea Bence, [address] [facility]    Ph: [address], [address] [facility] [facility] [address]    Ph: [phone] ? [place]   [address] [facility] [address] [facility]   Ph: [phone]  ? [address] [facility], [address] [facility]    Ph: [place] [address] [facility], [address] [facility]   Ph: [phone]    NORTH    ? [facility] [address] [facility] [address] [id number]    Ph: [phone]  [facility]   [address] [place] [address] [facility] [address]   Ph: Arron [place], [address] [facility]: [address], [address] [place]    Ph: [address] [facility].  [facility],[address] [address]    Ph: [phone]

## 2023-11-29 NOTE — PROGRESS NOTES
[address] [facility]. PRIMARY CARE  [address]  [facility] [address]  Dept: [phone]  Dept Fax: [phone]     11/29/2023      Santiago Olivo   1970     Chief Complaint   Patient presents with    Pre-op Exam     Bariatric surgery, Dr Rivka Nicolas, unknown date, [facility]  Pt comes in today for follow up and possible preop for bariatric surgery. He is doing well, has no acute concerns. He has been working with bariatric surgeon team consistently. He has successfully have multiple surgeries in past without complications.     Wt Readings from Last 5 Encounters:   11/29/23 (!) 152.8 kg (336 lb 12.8 oz)   11/16/23 (!) 155.6 kg (343 lb)   11/02/23 (!) 155.6 kg (343 lb)   10/19/23 (!) 154.2 kg (340 lb)   09/21/23 (!) 155 kg (341 lb 12.8 oz)     Lab Results   Component Value Date    WBC 4.5 07/07/2023    HGB 13.8 07/07/2023    HCT 39.9 (L) 07/07/2023    MCV 87.8 07/07/2023     07/07/2023     Lab Results   Component Value Date     07/07/2023    K 4.5 07/07/2023     07/07/2023    CO2 28 07/07/2023    BUN 15 07/07/2023    CREATININE 1.0 07/07/2023    GLUCOSE 109 (H) 11/11/2020    CALCIUM 9.3 07/07/2023    PROT 7.1 07/07/2023    LABALBU 4.4 07/07/2023    BILITOT 0.6 07/07/2023    ALKPHOS 91 07/07/2023    AST 15 07/07/2023    ALT 21 07/07/2023    LABGLOM >60 07/07/2023    GFRAA >60 02/21/2022    AGRATIO 1.6 07/07/2023    GLOB 3.2 10/13/2020     Lab Results   Component Value Date/Time    LABA1C 5.6 07/07/2023 12:18 PM       Lab Results   Component Value Date    CHOL 205 (H) 11/11/2020    CHOL 178 06/04/2019    CHOL 190 12/04/2017     Lab Results   Component Value Date    TRIG 72 11/11/2020    TRIG 71 06/04/2019    TRIG 61 12/04/2017     Lab Results   Component Value Date    HDL 40 07/07/2023    HDL 45 02/21/2022    HDL 43 11/11/2020     Lab Results   Component Value Date    LDLCALC 138 (H) 07/07/2023    LDLCALC 125 (H) 02/21/2022    LDLCALC 148 (H) 11/11/2020

## 2023-12-01 ENCOUNTER — ANESTHESIA (OUTPATIENT)
Dept: ENDOSCOPY | Age: 53
End: 2023-12-01
Payer: COMMERCIAL

## 2023-12-01 ENCOUNTER — HOSPITAL ENCOUNTER (OUTPATIENT)
Age: 53
Setting detail: OUTPATIENT SURGERY
Discharge: HOME | End: 2023-12-01
Attending: SURGERY | Admitting: SURGERY
Payer: COMMERCIAL

## 2023-12-01 ENCOUNTER — ANESTHESIA EVENT (OUTPATIENT)
Dept: ENDOSCOPY | Age: 53
End: 2023-12-01
Payer: COMMERCIAL

## 2023-12-01 VITALS
SYSTOLIC BLOOD PRESSURE: 138 MMHG | DIASTOLIC BLOOD PRESSURE: 91 MMHG | HEIGHT: 70 IN | OXYGEN SATURATION: 100 % | WEIGHT: 315 LBS | HEART RATE: 60 BPM | TEMPERATURE: 97.9 F | RESPIRATION RATE: 18 BRPM | BODY MASS INDEX: 45.1 KG/M2

## 2023-12-01 DIAGNOSIS — K21.9 GASTROESOPHAGEAL REFLUX DISEASE, UNSPECIFIED WHETHER ESOPHAGITIS PRESENT: ICD-10-CM

## 2023-12-01 PROCEDURE — 3700000000 HC ANESTHESIA ATTENDED CARE: Performed by: SURGERY

## 2023-12-01 PROCEDURE — 2580000003 HC RX 258: Performed by: SURGERY

## 2023-12-01 PROCEDURE — 6360000002 HC RX W HCPCS: Performed by: NURSE ANESTHETIST, CERTIFIED REGISTERED

## 2023-12-01 PROCEDURE — 88305 TISSUE EXAM BY PATHOLOGIST: CPT

## 2023-12-01 PROCEDURE — 7100000010 HC PHASE II RECOVERY - FIRST 15 MIN: Performed by: SURGERY

## 2023-12-01 PROCEDURE — 7100000011 HC PHASE II RECOVERY - ADDTL 15 MIN: Performed by: SURGERY

## 2023-12-01 PROCEDURE — 43239 EGD BIOPSY SINGLE/MULTIPLE: CPT | Performed by: SURGERY

## 2023-12-01 PROCEDURE — 2500000003 HC RX 250 WO HCPCS: Performed by: NURSE ANESTHETIST, CERTIFIED REGISTERED

## 2023-12-01 PROCEDURE — 2709999900 HC NON-CHARGEABLE SUPPLY: Performed by: SURGERY

## 2023-12-01 PROCEDURE — 3609012400 HC EGD TRANSORAL BIOPSY SINGLE/MULTIPLE: Performed by: SURGERY

## 2023-12-01 RX ORDER — LIDOCAINE HYDROCHLORIDE 20 MG/ML
INJECTION, SOLUTION EPIDURAL; INFILTRATION; INTRACAUDAL; PERINEURAL PRN
Status: DISCONTINUED | OUTPATIENT
Start: 2023-12-01 | End: 2023-12-01 | Stop reason: SDUPTHER

## 2023-12-01 RX ORDER — OMEPRAZOLE 20 MG/1
20 CAPSULE, DELAYED RELEASE ORAL
Qty: 90 CAPSULE | Refills: 1 | Status: SHIPPED | OUTPATIENT
Start: 2023-12-01

## 2023-12-01 RX ORDER — SODIUM CHLORIDE 9 MG/ML
INJECTION, SOLUTION INTRAVENOUS CONTINUOUS
Status: DISCONTINUED | OUTPATIENT
Start: 2023-12-01 | End: 2023-12-01 | Stop reason: HOSPADM

## 2023-12-01 RX ORDER — PROPOFOL 10 MG/ML
INJECTION, EMULSION INTRAVENOUS PRN
Status: DISCONTINUED | OUTPATIENT
Start: 2023-12-01 | End: 2023-12-01 | Stop reason: SDUPTHER

## 2023-12-01 RX ORDER — FENTANYL CITRATE 50 UG/ML
INJECTION, SOLUTION INTRAMUSCULAR; INTRAVENOUS PRN
Status: DISCONTINUED | OUTPATIENT
Start: 2023-12-01 | End: 2023-12-01 | Stop reason: SDUPTHER

## 2023-12-01 RX ADMIN — SODIUM CHLORIDE: 9 INJECTION, SOLUTION INTRAVENOUS at 06:27

## 2023-12-01 RX ADMIN — PROPOFOL 50 MG: 10 INJECTION, EMULSION INTRAVENOUS at 08:22

## 2023-12-01 RX ADMIN — LIDOCAINE HYDROCHLORIDE 50 MG: 20 INJECTION, SOLUTION EPIDURAL; INFILTRATION; INTRACAUDAL; PERINEURAL at 08:18

## 2023-12-01 RX ADMIN — FENTANYL CITRATE 25 MCG: 50 INJECTION, SOLUTION INTRAMUSCULAR; INTRAVENOUS at 08:24

## 2023-12-01 RX ADMIN — PROPOFOL 50 MG: 10 INJECTION, EMULSION INTRAVENOUS at 08:21

## 2023-12-01 RX ADMIN — PROPOFOL 50 MG: 10 INJECTION, EMULSION INTRAVENOUS at 08:20

## 2023-12-01 RX ADMIN — FENTANYL CITRATE 50 MCG: 50 INJECTION, SOLUTION INTRAMUSCULAR; INTRAVENOUS at 08:18

## 2023-12-01 RX ADMIN — FENTANYL CITRATE 25 MCG: 50 INJECTION, SOLUTION INTRAMUSCULAR; INTRAVENOUS at 08:21

## 2023-12-01 RX ADMIN — PROPOFOL 50 MG: 10 INJECTION, EMULSION INTRAVENOUS at 08:18

## 2023-12-01 ASSESSMENT — ENCOUNTER SYMPTOMS
EYE ITCHING: 0
DIARRHEA: 0
CONSTIPATION: 0
WHEEZING: 0
EYE PAIN: 0
VOMITING: 0
SHORTNESS OF BREATH: 0

## 2023-12-01 ASSESSMENT — PAIN SCALES - GENERAL: PAINLEVEL_OUTOF10: 0

## 2023-12-01 NOTE — ANESTHESIA PRE PROCEDURE
Department of Anesthesiology  Preprocedure Note       Name:  Jm Stanley   Age:  47 y.o.  :  1970                                          MRN:  [id number]         Date:  2023      Surgeon: Matthew Stanley):  Colleen Chao MD    Procedure: Procedure(s):  EGD DIAGNOSTIC ONLY    Medications prior to admission:   Prior to Admission medications    Medication Sig Start Date End Date Taking? Authorizing Provider   amLODIPine (NORVASC) 10 MG tablet Take 1 tablet by mouth daily  Patient not taking: Reported on 23  Shakila Roy DO       Current medications:    Current Facility-Administered Medications   Medication Dose Route Frequency Provider Last Rate Last Admin    0.9 % sodium chloride infusion   IntraVENous Continuous Colleen Chao  mL/hr at 23 0627 New Bag at 23 3180       Allergies:     Allergies   Allergen Reactions    Other      Ragweed/pollen/dog hair       Problem List:    Patient Active Problem List   Diagnosis Code    Morbid obesity with BMI of 45.0-49.9, adult ([address] [facility]) E66.01, Z68.42    Essential hypertension I10    Obstructive sleep apnea syndrome with CPAP G47.33    History of moderate to severe 2019 novel coronavirus disease (COVID-19) - 2020 Z86.16    Former cigarette smoker Z87.891    Family history of heart disease Z82.49    Lower urinary tract symptoms (LUTS) - suspected BPH, first discussed 2022 R39.9    Allergic rhinitis due to allergen J30.9    Chronic GERD K21.9       Past Medical History:        Diagnosis Date    Essential hypertension     History of moderate to severe 2019 novel coronavirus disease (COVID-19) - 2020    Severe obstructive sleep apnea        Past Surgical History:        Procedure Laterality Date    APPENDECTOMY      COLONOSCOPY N/A 2020    COLONOSCOPY W/ ANESTHESIA performed by Obie Caal MD at [address] [place] [address] ARTHROSCOPY Bilateral     SEPTOPLASTY Bilateral 2022

## 2023-12-01 NOTE — H&P
Department of [address] [place] [address] Physicians   [facility] [employer]  Attending Pre-operative History and Physical      DIAGNOSIS:  Obesity    INDICATION:  Pre-op    PROCEDURE:  EGD    CHIEF COMPLAINT:  Obesity    History Obtained From:  patient    HISTORY OF PRESENT ILLNESS:    The patient is a 47 y.o. male with significant past medical history of   Patient Active Problem List   Diagnosis    Morbid obesity with BMI of 45.0-49.9, adult ([address])    Essential hypertension    Obstructive sleep apnea syndrome with CPAP    History of moderate to severe 2019 novel coronavirus disease (COVID-19) - 11/2020    Former cigarette smoker    Family history of heart disease    Lower urinary tract symptoms (LUTS) - suspected BPH, first discussed 1/2022    Allergic rhinitis due to allergen    Chronic GERD      who presents for pre-op EGD    Past Medical History:        Diagnosis Date    Essential hypertension     History of moderate to severe 2019 novel coronavirus disease (COVID-19) - 11/2020 11/6/2020    Severe obstructive sleep apnea      Past Surgical History:        Procedure Laterality Date    APPENDECTOMY      COLONOSCOPY N/A 01/20/2020    COLONOSCOPY W/ ANESTHESIA performed by Michelle Stevens MD at [address] [place] [address] ARTHROSCOPY Bilateral     SEPTOPLASTY Bilateral 11/18/2022    BILATERAL INFERIOR TURBINATE REDUCTION AND SEPTOPLASTY performed by Brandon Conrad MD at [facility]     Medications Prior to Admission:   Medications Prior to Admission: amLODIPine (NORVASC) 10 MG tablet, Take 1 tablet by mouth daily (Patient not taking: Reported on 12/1/2023)    Allergies: Other    Social History:   TOBACCO:   reports that he quit smoking about 2 years ago. His smoking use included cigars. He has never used smokeless tobacco.  ETOH:   reports current alcohol use of about 1.0 - 2.0 standard drink of alcohol per week.   Family History:       Problem Relation Age of Onset    Heart evaluate the stomach. I did explain thoroughly to the patient that compliance with pre- and post op diet and other recommendations are integral part to improve the chances of successful weight loss and also not following it could end with serious health complications. Some strategies discussed today include but not limited to : 30/30/30 minutes rule, food diary, avoid fast food and packing/planing ahead, & increasing exercise. 1.  Patient is a 47 y.o. male with above specified procedure planned EGD with deep sedation  2. Procedure options, risks and benefits reviewed with patient. Patient expresses understanding. Benefits and risks of endoscopy explained. Informed consent obtained. Risks including but not limited to; Infection, bleeding, leaks, persistent nausea, vomiting, or reflux, injury to surrounding structures, perforation of viscus,  risks of anesthesia, strictures, neurological complications, paralysis, unable to diagnose a condition or miss a diagnosis, complications from biopsy,  Deep Venous Thrombosis , pulmonary embolism and / or death.        Electronically signed by Amber Barnes MD , FACS, FASMBS  on 12/1/2023 at 8:02 AM

## 2023-12-01 NOTE — ANESTHESIA POSTPROCEDURE EVALUATION
Department of Anesthesiology  Postprocedure Note    Patient: Ang Vela  MRN: [id number]  YOB: 1970  Date of evaluation: 12/1/2023      Procedure Summary       Date: 12/01/23 Room / Location: [address] [place] [address]    Anesthesia Start: 0494 Anesthesia Stop: 4274    Procedure: EGD BIOPSY (Abdomen) Diagnosis:       Gastroesophageal reflux disease, unspecified whether esophagitis present      (Gastroesophageal reflux disease, unspecified whether esophagitis present [K21.9])    Surgeons: Chely Mcgarry MD Responsible Provider: Jose Manuel Hoffman MD    Anesthesia Type: MAC ASA Status: 3            Anesthesia Type: No value filed. Sukumar Phase I: Sukumar Score: 10    Sukumar Phase II: Sukumar Score: 9      Anesthesia Post Evaluation    Patient location during evaluation: PACU  Level of consciousness: awake  Airway patency: patent  Complications: no  Cardiovascular status: hemodynamically stable  Respiratory status: acceptable  There was medical reason for not using a multimodal analgesia pain management approach.

## 2023-12-01 NOTE — PROGRESS NOTES
Pt arrived from Endo to PACU bay 1. Report received from Endo staff. Pt arousable to voice. Pt on RA, NSR, and VSS. Will continue to monitor.  Pt in phase 2

## 2023-12-01 NOTE — DISCHARGE INSTRUCTIONS
ENDOSCOPY DISCHARGE INSTRUCTIONS    You may experience some lightheadedness for the next several hours. Plan on quiet relaxation for the rest of today. A responsible adult needs to stay with you today. Because of the medications you received today-do not drive,operate machinery,or sign any contractual agreement for the next 24 hours. Do not drink any alcoholic beverages or take any unprescribed medications tonight. Eat bland food and avoid anything greasy or spicy initially-progress to your normal diet gradually. Diet restrictions as instructed. If you have any of the following problems, notify your physician or return to the hospital emergency room : fever, chills, excessive bleeding, excessive vomiting, difficulty swallowing, uncontrolled pain, increased abdominal distention, shortness of breath or any other problems. If you have a sore throat, you may use lozenges or salt water gargles. Please call Dr. Jimmy Toribio office in 5 business days for biopsy results [phone]. ANESTHESIA DISCHARGE INSTRUCTIONS    Wear your seatbelt home. You are under the influence of drugs-do not drink alcohol,drive,operate machinery,or make any important decisions or sign any legal documentsfor 24 hours  Children should not ride [employer] or play on gym sets  for 24 hours after surgery. A responsible adult needs to be with you for 24 hours. You may experience lightheadedness,dizziness,or sleepiness following surgery. Rest at home today- increase activity as tolerated. Progress slowly to a regular diet unless your physician has instructed you otherwise. Drink plenty of water. If nausea becomes a problem call your physician. Coughing,sore throat,and muscle aches are other side effects of anesthesia,and should improve with time. Do not drive,operate machinery while taking narcotics.

## 2023-12-01 NOTE — PROGRESS NOTES
Discharge instructions reviewed with patient and pts wife April. All home medications have been reviewed, pt v/u. Discharge instructions signed. Pt discharged via wheelchair. Pt discharged with all belongings. Wife April taking stable pt home.

## 2023-12-07 ENCOUNTER — OFFICE VISIT (OUTPATIENT)
Dept: BARIATRICS/WEIGHT MGMT | Age: 53
End: 2023-12-07
Payer: COMMERCIAL

## 2023-12-07 VITALS
HEART RATE: 89 BPM | OXYGEN SATURATION: 98 % | DIASTOLIC BLOOD PRESSURE: 82 MMHG | HEIGHT: 70 IN | WEIGHT: 315 LBS | RESPIRATION RATE: 18 BRPM | BODY MASS INDEX: 45.1 KG/M2 | SYSTOLIC BLOOD PRESSURE: 138 MMHG

## 2023-12-07 DIAGNOSIS — I10 ESSENTIAL HYPERTENSION: ICD-10-CM

## 2023-12-07 DIAGNOSIS — E66.01 MORBID OBESITY WITH BMI OF 45.0-49.9, ADULT (HCC): Primary | ICD-10-CM

## 2023-12-07 DIAGNOSIS — G47.33 OBSTRUCTIVE SLEEP APNEA SYNDROME: ICD-10-CM

## 2023-12-07 DIAGNOSIS — K21.9 CHRONIC GERD: ICD-10-CM

## 2023-12-07 PROCEDURE — 3079F DIAST BP 80-89 MM HG: CPT | Performed by: SURGERY

## 2023-12-07 PROCEDURE — 3075F SYST BP GE 130 - 139MM HG: CPT | Performed by: SURGERY

## 2023-12-07 PROCEDURE — 99214 OFFICE O/P EST MOD 30 MIN: CPT | Performed by: SURGERY

## 2023-12-07 NOTE — PROGRESS NOTES
Liv Negrete lost 8 lbs over 1 month. Pt report only having liquids every other day. Breakfast: Protein shake (zingular)   Snack: None  Lunch: 1/2 cup Meat + 1/2 cup veggies + 1/2 cup rice OR Unjury french onion  Snack: None OR sf jello   Dinner: Rondal Hastings chix soup OR Salad w/ chix/beef/ ranch + sometimes rice  Snack: Popcorn     Is pt consuming smaller portions? meal prepping for lunches and some dinners    Is pt consuming at least 64 oz of fluids per day?  22 oz bottle fills 3X/day water/omar      Is pt consuming carbonated, caffeinated, or sugary beverages? Ginger ale/mixed drink 1-2/month  + decaf coffee w/ creamer, hot tea w/ honey    Has pt sampled Unjury and/or Nectar protein? Yes - soups     Has patient attended a support group?  Completed 9/25    Exercise: [employer] walking 1.5 miles 1/week     Plan/Recommendations:   - Avoid liquid only day   - Add second day of exercise   - Avoid sugar/honey/creamer     Handouts: Protein shakes     Linda Elam, SHAUNA, LD

## 2023-12-07 NOTE — PATIENT INSTRUCTIONS
Patient received dietary handouts and education.     Plan/Recommendations:   - Avoid liquid only day   - Add second day of exercise   - Avoid sugar/honey/creamer

## 2023-12-26 ENCOUNTER — HOSPITAL ENCOUNTER (OUTPATIENT)
Age: 53
Discharge: HOME | End: 2023-12-26
Payer: COMMERCIAL

## 2023-12-26 DIAGNOSIS — G47.33 OBSTRUCTIVE SLEEP APNEA SYNDROME: ICD-10-CM

## 2023-12-26 DIAGNOSIS — I10 ESSENTIAL HYPERTENSION: ICD-10-CM

## 2023-12-26 DIAGNOSIS — E66.01 MORBID OBESITY WITH BMI OF 45.0-49.9, ADULT (HCC): ICD-10-CM

## 2023-12-26 LAB
25(OH)D3 SERPL-MCNC: 27 NG/ML
ALBUMIN SERPL-MCNC: 4.4 G/DL (ref 3.4–5)
ALBUMIN/GLOB SERPL: 1.6 {RATIO} (ref 1.1–2.2)
ALP SERPL-CCNC: 89 U/L (ref 40–129)
ALT SERPL-CCNC: 15 U/L (ref 10–40)
ANION GAP SERPL CALCULATED.3IONS-SCNC: 8 MMOL/L (ref 3–16)
AST SERPL-CCNC: 13 U/L (ref 15–37)
BASOPHILS # BLD: 0 K/UL (ref 0–0.2)
BASOPHILS NFR BLD: 0.9 %
BILIRUB SERPL-MCNC: 0.6 MG/DL (ref 0–1)
BUN SERPL-MCNC: 14 MG/DL (ref 7–20)
CALCIUM SERPL-MCNC: 8.7 MG/DL (ref 8.3–10.6)
CHLORIDE SERPL-SCNC: 106 MMOL/L (ref 99–110)
CHOLEST SERPL-MCNC: 166 MG/DL (ref 0–199)
CO2 SERPL-SCNC: 29 MMOL/L (ref 21–32)
CREAT SERPL-MCNC: 0.9 MG/DL (ref 0.9–1.3)
DEPRECATED RDW RBC AUTO: 14.6 % (ref 12.4–15.4)
EOSINOPHIL # BLD: 0.2 K/UL (ref 0–0.6)
EOSINOPHIL NFR BLD: 4.7 %
FOLATE SERPL-MCNC: 8.13 NG/ML (ref 4.78–24.2)
GFR SERPLBLD CREATININE-BSD FMLA CKD-EPI: >60 ML/MIN/{1.73_M2}
GLUCOSE SERPL-MCNC: 95 MG/DL (ref 70–99)
HCT VFR BLD AUTO: 40 % (ref 40.5–52.5)
HDLC SERPL-MCNC: 42 MG/DL (ref 40–60)
HGB BLD-MCNC: 13.8 G/DL (ref 13.5–17.5)
INR PPP: 1.04 (ref 0.84–1.16)
IRON SATN MFR SERPL: 39 % (ref 20–50)
IRON SERPL-MCNC: 93 UG/DL (ref 59–158)
LDLC SERPL CALC-MCNC: 113 MG/DL
LYMPHOCYTES # BLD: 1.5 K/UL (ref 1–5.1)
LYMPHOCYTES NFR BLD: 29.6 %
MCH RBC QN AUTO: 29.9 PG (ref 26–34)
MCHC RBC AUTO-ENTMCNC: 34.6 G/DL (ref 31–36)
MCV RBC AUTO: 86.5 FL (ref 80–100)
MONOCYTES # BLD: 0.5 K/UL (ref 0–1.3)
MONOCYTES NFR BLD: 9.7 %
NEUTROPHILS # BLD: 2.9 K/UL (ref 1.7–7.7)
NEUTROPHILS NFR BLD: 55.1 %
PLATELET # BLD AUTO: 242 K/UL (ref 135–450)
PMV BLD AUTO: 8.9 FL (ref 5–10.5)
POTASSIUM SERPL-SCNC: 4.2 MMOL/L (ref 3.5–5.1)
PROT SERPL-MCNC: 7.2 G/DL (ref 6.4–8.2)
PROTHROMBIN TIME: 13.7 SEC (ref 11.5–14.8)
RBC # BLD AUTO: 4.63 M/UL (ref 4.2–5.9)
SODIUM SERPL-SCNC: 143 MMOL/L (ref 136–145)
TIBC SERPL-MCNC: 237 UG/DL (ref 260–445)
TRIGL SERPL-MCNC: 55 MG/DL (ref 0–150)
TSH SERPL DL<=0.005 MIU/L-ACNC: 1.05 UIU/ML (ref 0.27–4.2)
VIT B12 SERPL-MCNC: 427 PG/ML (ref 211–911)
VLDLC SERPL CALC-MCNC: 11 MG/DL
WBC # BLD AUTO: 5.2 K/UL (ref 4–11)

## 2023-12-26 PROCEDURE — 83036 HEMOGLOBIN GLYCOSYLATED A1C: CPT

## 2023-12-26 PROCEDURE — 80061 LIPID PANEL: CPT

## 2023-12-26 PROCEDURE — 84446 ASSAY OF VITAMIN E: CPT

## 2023-12-26 PROCEDURE — 85610 PROTHROMBIN TIME: CPT

## 2023-12-26 PROCEDURE — 82306 VITAMIN D 25 HYDROXY: CPT

## 2023-12-26 PROCEDURE — 36415 COLL VENOUS BLD VENIPUNCTURE: CPT

## 2023-12-26 PROCEDURE — 83540 ASSAY OF IRON: CPT

## 2023-12-26 PROCEDURE — 82746 ASSAY OF FOLIC ACID SERUM: CPT

## 2023-12-26 PROCEDURE — 84590 ASSAY OF VITAMIN A: CPT

## 2023-12-26 PROCEDURE — 83550 IRON BINDING TEST: CPT

## 2023-12-26 PROCEDURE — 85025 COMPLETE CBC W/AUTO DIFF WBC: CPT

## 2023-12-26 PROCEDURE — 80053 COMPREHEN METABOLIC PANEL: CPT

## 2023-12-26 PROCEDURE — 82607 VITAMIN B-12: CPT

## 2023-12-26 PROCEDURE — 84443 ASSAY THYROID STIM HORMONE: CPT

## 2023-12-26 PROCEDURE — 84425 ASSAY OF VITAMIN B-1: CPT

## 2023-12-27 LAB
EST. AVERAGE GLUCOSE BLD GHB EST-MCNC: 114 MG/DL
HBA1C MFR BLD: 5.6 %

## 2023-12-28 LAB
A-TOCOPHEROL VIT E SERPL-MCNC: 8.3 MG/L (ref 5.5–18)
ANNOTATION COMMENT IMP: NORMAL
BETA+GAMMA TOCOPHEROL SERPL-MCNC: 1.3 MG/L (ref 0–6)
RETINYL PALMITATE SERPL-MCNC: 0.02 MG/L (ref 0–0.1)
VIT A SERPL-MCNC: 0.6 MG/L (ref 0.3–1.2)

## 2023-12-30 LAB — VIT B1 BLD-MCNC: 117 NMOL/L (ref 70–180)

## 2024-01-18 ENCOUNTER — OFFICE VISIT (OUTPATIENT)
Dept: BARIATRICS/WEIGHT MGMT | Age: 54
End: 2024-01-18
Payer: COMMERCIAL

## 2024-01-18 VITALS
DIASTOLIC BLOOD PRESSURE: 78 MMHG | BODY MASS INDEX: 45.1 KG/M2 | SYSTOLIC BLOOD PRESSURE: 136 MMHG | HEIGHT: 70 IN | WEIGHT: 315 LBS | OXYGEN SATURATION: 98 % | RESPIRATION RATE: 18 BRPM | HEART RATE: 72 BPM

## 2024-01-18 DIAGNOSIS — K21.9 CHRONIC GERD: ICD-10-CM

## 2024-01-18 DIAGNOSIS — G47.33 OBSTRUCTIVE SLEEP APNEA SYNDROME: ICD-10-CM

## 2024-01-18 DIAGNOSIS — E66.01 MORBID OBESITY WITH BMI OF 45.0-49.9, ADULT (HCC): Primary | ICD-10-CM

## 2024-01-18 PROCEDURE — 3075F SYST BP GE 130 - 139MM HG: CPT | Performed by: SURGERY

## 2024-01-18 PROCEDURE — 99214 OFFICE O/P EST MOD 30 MIN: CPT | Performed by: SURGERY

## 2024-01-18 PROCEDURE — 3078F DIAST BP <80 MM HG: CPT | Performed by: SURGERY

## 2024-01-18 NOTE — PROGRESS NOTES
Reviewed pt chart and discussed plan with provider. Will continue to follow.     Shannan Zhang RD, LD    
Comment: occasional     I counseled the patient on the importance of not smoking and risks of ETOH.   Allergies   Allergen Reactions    Other      Ragweed/pollen/dog hair     Vitals:    01/18/24 1409   BP: 136/78   Pulse: 72   Resp: 18   SpO2: 98%   Weight: (!) 150.1 kg (331 lb)   Height: 1.778 m (5' 10\")       Body mass index is 47.49 kg/m².      Current Outpatient Medications:     omeprazole (PRILOSEC) 20 MG delayed release capsule, Take 1 capsule by mouth every morning (before breakfast), Disp: 90 capsule, Rfl: 1    amLODIPine (NORVASC) 10 MG tablet, Take 1 tablet by mouth daily (Patient not taking: Reported on 12/7/2023), Disp: 90 tablet, Rfl: 3      Review of Systems - History obtained from the patient  General ROS: negative  Psychological ROS: negative  Ophthalmic ROS: negative  Neurological ROS: negative  ENT ROS: negative  Allergy and Immunology ROS: negative  Hematological and Lymphatic ROS: negative  Endocrine ROS: negative  Breast ROS: negative  Respiratory ROS: negative  Cardiovascular ROS: negative  Gastrointestinal ROS:negative  Genito-Urinary ROS: negative  Musculoskeletal ROS: negative   Skin ROS: negative    Physical Exam   Vitals Reviewed   Constitutional: Patient is oriented to person, place, and time. Patient appears well-developed and well-nourished. Patient is active and cooperative.  Non-toxic appearance. No distress.   HENT:   Head: Normocephalic and atraumatic. Head is without abrasion and without laceration. Hair is normal.   Right Ear: External ear normal. No lacerations. No drainage, swelling .   Left Ear: External ear normal. No lacerations. No drainage, swelling.   Mouth/Nose: Mask in Place   Eyes: Conjunctivae, EOM and lids are normal. Right eye exhibits no discharge. No foreign body present in the right eye. Left eye exhibits no discharge. No foreign body present in the left eye. No scleral icterus.   Neck: Trachea normal and normal range of motion. No JVD present.   Pulmonary/Chest:

## 2024-01-22 ASSESSMENT — ENCOUNTER SYMPTOMS
GASTROINTESTINAL NEGATIVE: 1
RESPIRATORY NEGATIVE: 1
COUGH: 0
EYES NEGATIVE: 1
SHORTNESS OF BREATH: 0

## 2024-01-22 NOTE — PROGRESS NOTES
Patient Name:   Charlene \"Bartolome\" Jose Roberto        Type of Surgery:  Laparoscopic Sleeve          Date of Surgery:  2/12/24        Start Pre-Op Diet On:  1/30/24        Start Clear Liquids On:  2/11/24

## 2024-01-22 NOTE — PROGRESS NOTES
[facility] Physicians   [facility] [employer]    Subjective:      Patient ID: Charlene Cid is a 53 y.o. male    HPI    The patient is here for their visit prior to the bariatric surgery preoperative class. He has made several attempts at weight loss in the past without success and now has been scheduled to have bariatric surgery on 2024 for future weight loss. He is working to change his dietary behaviors and lose weight to improve comorbid conditions such as hypertension, obstructive sleep apnea and GERD.    Charlene Cid is a very pleasant 53 y.o. male with Body mass index is 48.21 kg/m².    Past Medical History:   Diagnosis Date    Essential hypertension     History of moderate to severe 2019 novel coronavirus disease (COVID-19) - 2020    Severe obstructive sleep apnea      Past Surgical History:   Procedure Laterality Date    APPENDECTOMY      COLONOSCOPY N/A 2020    COLONOSCOPY W/ ANESTHESIA performed by Reinaldo Lozada MD at [facility] ENDOSCOPY    KNEE ARTHROSCOPY Bilateral     SEPTOPLASTY Bilateral 2022    BILATERAL INFERIOR TURBINATE REDUCTION AND SEPTOPLASTY performed by Jonah Pires MD at [facility] OR    UPPER GASTROINTESTINAL ENDOSCOPY N/A 2023    EGD BIOPSY performed by Sabrina Villanueva MD at [facility] ENDOSCOPY     Family History   Problem Relation Age of Onset    Heart Disease Father     Heart Attack Father 55    High Blood Pressure Father     Cancer Mother         Multiple Myeloma    Cancer Maternal Grandmother         Lung    Other Brother         RUBI    Other Brother         RUBI     Social History     Tobacco Use    Smoking status: Former     Types: Cigars     Quit date: 2021     Years since quittin.3    Smokeless tobacco: Never    Tobacco comments:     light cigar smoker/ 1 every month or so,   Substance Use Topics    Alcohol use: Not Currently     Alcohol/week: 1.0 - 2.0 standard drink of alcohol     Types: 1 - 2 Shots of

## 2024-01-23 ENCOUNTER — OFFICE VISIT (OUTPATIENT)
Dept: BARIATRICS/WEIGHT MGMT | Age: 54
End: 2024-01-23
Payer: COMMERCIAL

## 2024-01-23 ENCOUNTER — TELEPHONE (OUTPATIENT)
Dept: BARIATRICS/WEIGHT MGMT | Age: 54
End: 2024-01-23

## 2024-01-23 VITALS — HEIGHT: 70 IN | BODY MASS INDEX: 45.1 KG/M2 | WEIGHT: 315 LBS

## 2024-01-23 DIAGNOSIS — E66.01 MORBID OBESITY WITH BMI OF 45.0-49.9, ADULT (HCC): ICD-10-CM

## 2024-01-23 DIAGNOSIS — I10 ESSENTIAL HYPERTENSION: ICD-10-CM

## 2024-01-23 DIAGNOSIS — K21.9 CHRONIC GERD: Primary | ICD-10-CM

## 2024-01-23 DIAGNOSIS — G47.33 OBSTRUCTIVE SLEEP APNEA SYNDROME: ICD-10-CM

## 2024-01-23 PROCEDURE — 99214 OFFICE O/P EST MOD 30 MIN: CPT | Performed by: NURSE PRACTITIONER

## 2024-01-24 NOTE — PROGRESS NOTES
Name_______________________________________Printed:____________________  Date and time of surgery___2/12 [id number]_____________________Arrival Time:________________   1. The instructions given regarding when and if a patient needs to stop oral intake prior to surgery varies.Follow the specific instructions you were given                  _x__Nothing to eat or to drink after Midnight the night before.                   ____Carbo loading or instructions will be given to select patients-if you have been given those instructions -please do the following                           The evening before your surgery after dinner before midnight drink 40 ounces of gatorade.If you are diabetic use sugar free.  The morning of surgery drink 40 ounces of water.This needs to be finished 3 hours prior to your surgery start time.    2. Take the following pills with a small sip of water on the morning of surgery_____none______________________________________________                  Do not take blood pressure medications ending in pril or sartan the ricci prior to surgery or the morning of surgery. Dr Villanueva's patient are not to take any medications the AM of surgery.         3. Aspirin, Ibuprofen, Advil, Naproxen, Vitamin E and other Anti-inflammatory products and supplements should be stopped for 5 -7days before surgery or as directed by your physician.   4. Check with your Doctor regarding stopping Plavix, Coumadin,Eliquis, Lovenox,Effient,Pradaxa,Xarelto, Fragmin or other blood thinners and follow their instructions.   5. Do not smoke, and do not drink any alcoholic beverages 24 hours prior to surgery.  This includes NA Beer.Refrain from the usage of any recreational drugs.   6. You may brush your teeth and gargle the morning of surgery.  DO NOT SWALLOW WATER   7. You MUST make arrangements for a responsible adult to stay on site while you are here and take you home after your surgery. You will not be allowed to leave alone or drive

## 2024-02-05 ENCOUNTER — OFFICE VISIT (OUTPATIENT)
Dept: PRIMARY CARE CLINIC | Age: 54
End: 2024-02-05
Payer: COMMERCIAL

## 2024-02-05 VITALS
TEMPERATURE: 97.8 F | WEIGHT: 315 LBS | OXYGEN SATURATION: 99 % | HEART RATE: 76 BPM | SYSTOLIC BLOOD PRESSURE: 142 MMHG | HEIGHT: 69 IN | BODY MASS INDEX: 46.65 KG/M2 | DIASTOLIC BLOOD PRESSURE: 93 MMHG

## 2024-02-05 DIAGNOSIS — I10 ESSENTIAL HYPERTENSION: Primary | ICD-10-CM

## 2024-02-05 DIAGNOSIS — E66.01 MORBID OBESITY WITH BMI OF 45.0-49.9, ADULT (HCC): ICD-10-CM

## 2024-02-05 DIAGNOSIS — G47.33 OBSTRUCTIVE SLEEP APNEA SYNDROME: ICD-10-CM

## 2024-02-05 DIAGNOSIS — Z01.818 PREOPERATIVE EXAMINATION: ICD-10-CM

## 2024-02-05 PROCEDURE — 3077F SYST BP >= 140 MM HG: CPT | Performed by: FAMILY MEDICINE

## 2024-02-05 PROCEDURE — 3080F DIAST BP >= 90 MM HG: CPT | Performed by: FAMILY MEDICINE

## 2024-02-05 PROCEDURE — 99213 OFFICE O/P EST LOW 20 MIN: CPT | Performed by: FAMILY MEDICINE

## 2024-02-05 ASSESSMENT — PATIENT HEALTH QUESTIONNAIRE - PHQ9
1. LITTLE INTEREST OR PLEASURE IN DOING THINGS: 0
2. FEELING DOWN, DEPRESSED OR HOPELESS: 0
SUM OF ALL RESPONSES TO PHQ QUESTIONS 1-9: 0
SUM OF ALL RESPONSES TO PHQ QUESTIONS 1-9: 0
SUM OF ALL RESPONSES TO PHQ9 QUESTIONS 1 & 2: 0
SUM OF ALL RESPONSES TO PHQ QUESTIONS 1-9: 0
SUM OF ALL RESPONSES TO PHQ QUESTIONS 1-9: 0

## 2024-02-05 ASSESSMENT — ENCOUNTER SYMPTOMS
NAUSEA: 0
ABDOMINAL PAIN: 0
SORE THROAT: 0
COUGH: 0
SHORTNESS OF BREATH: 0

## 2024-02-05 NOTE — PROGRESS NOTES
[facility]  [facility]  [address] [place] [id number]  Dept: [phone]  Dept Fax: [phone]     2024      Charlene Hitchcockll   1970     Chief Complaint   Patient presents with    Pre-op Exam     Testing done already.  Gastric bypass, Dr Villanueva, [facility], 2..24       HPI  Pt comes in today for preop. He is scheduled for sleeve gastrectomy 24 with Dr Villanueva. He has been working with bariatric surgeon team consistently. He has successfully have multiple surgeries in past without complications. No acute concerns.    Wt Readings from Last 5 Encounters:   24 (!) 144 kg (317 lb 6.4 oz)   24 (!) 152.4 kg (336 lb)   24 (!) 150.1 kg (331 lb)   23 (!) 152 kg (335 lb)   23 (!) 152.5 kg (336 lb 3.2 oz)         2024    10:44 AM 2023    11:34 PM 2022    11:52 AM 1/10/2020     7:47 AM 2019     9:13 AM 2017    12:15 PM   PHQ Scores   PHQ2 Score 0 0 0 0 0 0   PHQ9 Score 0 0 0 0 0 0     Interpretation of Total Score Depression Severity: 1-4 = Minimal depression, 5-9 = Mild depression, 10-14 = Moderate depression, 15-19 = Moderately severe depression, 20-27 = Severe depression     Prior to Visit Medications    Medication Sig Taking? Authorizing Provider   omeprazole (PRILOSEC) 20 MG delayed release capsule Take 1 capsule by mouth every morning (before breakfast) Sabrina Bryan MD       Past Medical History:   Diagnosis Date    Essential hypertension     Severe obstructive sleep apnea     cpap        Social History     Tobacco Use    Smoking status: Former     Types: Cigars     Quit date: 2021     Years since quittin.4    Smokeless tobacco: Never    Tobacco comments:     light cigar smoker/ 1 every month or so,   Vaping Use    Vaping Use: Never used   Substance Use Topics    Alcohol use: Not Currently     Alcohol/week: 1.0 - 2.0 standard drink of alcohol     Types: 1 - 2 Shots of liquor per week

## 2024-02-12 ENCOUNTER — ANESTHESIA EVENT (OUTPATIENT)
Dept: OPERATING ROOM | Age: 54
DRG: 621 | End: 2024-02-12
Payer: COMMERCIAL

## 2024-02-12 ENCOUNTER — CLINICAL DOCUMENTATION (OUTPATIENT)
Dept: BARIATRICS/WEIGHT MGMT | Age: 54
End: 2024-02-12

## 2024-02-12 ENCOUNTER — HOSPITAL ENCOUNTER (INPATIENT)
Age: 54
LOS: 1 days | Discharge: HOME | DRG: 621 | End: 2024-02-13
Attending: SURGERY | Admitting: SURGERY
Payer: COMMERCIAL

## 2024-02-12 ENCOUNTER — ANESTHESIA (OUTPATIENT)
Dept: OPERATING ROOM | Age: 54
DRG: 621 | End: 2024-02-12
Payer: COMMERCIAL

## 2024-02-12 DIAGNOSIS — Z98.84 S/P LAPAROSCOPIC SLEEVE GASTRECTOMY: Primary | ICD-10-CM

## 2024-02-12 DIAGNOSIS — E66.01 MORBID OBESITY (HCC): ICD-10-CM

## 2024-02-12 DIAGNOSIS — K43.9 VENTRAL HERNIA WITHOUT OBSTRUCTION OR GANGRENE: ICD-10-CM

## 2024-02-12 LAB
ABO + RH BLD: NORMAL
BLD GP AB SCN SERPL QL: NORMAL
GLUCOSE BLD-MCNC: 82 MG/DL (ref 70–99)
PERFORMED ON: NORMAL

## 2024-02-12 PROCEDURE — 86901 BLOOD TYPING SEROLOGIC RH(D): CPT

## 2024-02-12 PROCEDURE — 2500000003 HC RX 250 WO HCPCS: Performed by: SURGERY

## 2024-02-12 PROCEDURE — 86850 RBC ANTIBODY SCREEN: CPT

## 2024-02-12 PROCEDURE — 2500000003 HC RX 250 WO HCPCS: Performed by: NURSE ANESTHETIST, CERTIFIED REGISTERED

## 2024-02-12 PROCEDURE — 36415 COLL VENOUS BLD VENIPUNCTURE: CPT

## 2024-02-12 PROCEDURE — 2580000003 HC RX 258: Performed by: NURSE ANESTHETIST, CERTIFIED REGISTERED

## 2024-02-12 PROCEDURE — C9145 HC RX W HCPCS: HCPCS | Performed by: SURGERY

## 2024-02-12 PROCEDURE — 3600000015 HC SURGERY LEVEL 5 ADDTL 15MIN: Performed by: SURGERY

## 2024-02-12 PROCEDURE — 2720000010 HC SURG SUPPLY STERILE: Performed by: SURGERY

## 2024-02-12 PROCEDURE — A4217 STERILE WATER/SALINE, 500 ML: HCPCS | Performed by: SURGERY

## 2024-02-12 PROCEDURE — 49593 RPR AA HRN 1ST 3-10 RDC: CPT | Performed by: SURGERY

## 2024-02-12 PROCEDURE — 6360000002 HC RX W HCPCS: Performed by: SURGERY

## 2024-02-12 PROCEDURE — 3600000005 HC SURGERY LEVEL 5 BASE: Performed by: SURGERY

## 2024-02-12 PROCEDURE — 0DB64Z3 EXCISION OF STOMACH, PERCUTANEOUS ENDOSCOPIC APPROACH, VERTICAL: ICD-10-PCS | Performed by: SURGERY

## 2024-02-12 PROCEDURE — 2709999900 HC NON-CHARGEABLE SUPPLY: Performed by: SURGERY

## 2024-02-12 PROCEDURE — 6370000000 HC RX 637 (ALT 250 FOR IP): Performed by: SURGERY

## 2024-02-12 PROCEDURE — 6360000002 HC RX W HCPCS: Performed by: ANESTHESIOLOGY

## 2024-02-12 PROCEDURE — 86900 BLOOD TYPING SEROLOGIC ABO: CPT

## 2024-02-12 PROCEDURE — 7100000000 HC PACU RECOVERY - FIRST 15 MIN: Performed by: SURGERY

## 2024-02-12 PROCEDURE — 3700000001 HC ADD 15 MINUTES (ANESTHESIA): Performed by: SURGERY

## 2024-02-12 PROCEDURE — C9113 INJ PANTOPRAZOLE SODIUM, VIA: HCPCS | Performed by: SURGERY

## 2024-02-12 PROCEDURE — 1200000000 HC SEMI PRIVATE

## 2024-02-12 PROCEDURE — 2580000003 HC RX 258: Performed by: ANESTHESIOLOGY

## 2024-02-12 PROCEDURE — 2580000003 HC RX 258: Performed by: SURGERY

## 2024-02-12 PROCEDURE — 3700000000 HC ANESTHESIA ATTENDED CARE: Performed by: SURGERY

## 2024-02-12 PROCEDURE — 0WQF4ZZ REPAIR ABDOMINAL WALL, PERCUTANEOUS ENDOSCOPIC APPROACH: ICD-10-PCS | Performed by: SURGERY

## 2024-02-12 PROCEDURE — 6360000002 HC RX W HCPCS: Performed by: NURSE ANESTHETIST, CERTIFIED REGISTERED

## 2024-02-12 PROCEDURE — 43775 LAP SLEEVE GASTRECTOMY: CPT | Performed by: SURGERY

## 2024-02-12 PROCEDURE — 7100000001 HC PACU RECOVERY - ADDTL 15 MIN: Performed by: SURGERY

## 2024-02-12 PROCEDURE — 94761 N-INVAS EAR/PLS OXIMETRY MLT: CPT

## 2024-02-12 PROCEDURE — 88307 TISSUE EXAM BY PATHOLOGIST: CPT

## 2024-02-12 RX ORDER — HYDROMORPHONE HYDROCHLORIDE 2 MG/ML
0.5 INJECTION, SOLUTION INTRAMUSCULAR; INTRAVENOUS; SUBCUTANEOUS EVERY 5 MIN PRN
Status: DISCONTINUED | OUTPATIENT
Start: 2024-02-12 | End: 2024-02-12 | Stop reason: HOSPADM

## 2024-02-12 RX ORDER — SODIUM CHLORIDE 9 MG/ML
INJECTION, SOLUTION INTRAVENOUS CONTINUOUS PRN
Status: DISCONTINUED | OUTPATIENT
Start: 2024-02-12 | End: 2024-02-12 | Stop reason: SDUPTHER

## 2024-02-12 RX ORDER — ENOXAPARIN SODIUM 100 MG/ML
40 INJECTION SUBCUTANEOUS ONCE
Status: COMPLETED | OUTPATIENT
Start: 2024-02-12 | End: 2024-02-12

## 2024-02-12 RX ORDER — SCOLOPAMINE TRANSDERMAL SYSTEM 1 MG/1
1 PATCH, EXTENDED RELEASE TRANSDERMAL ONCE
Status: DISCONTINUED | OUTPATIENT
Start: 2024-02-12 | End: 2024-02-12

## 2024-02-12 RX ORDER — SODIUM CHLORIDE 0.9 % (FLUSH) 0.9 %
5-40 SYRINGE (ML) INJECTION EVERY 12 HOURS SCHEDULED
Status: DISCONTINUED | OUTPATIENT
Start: 2024-02-12 | End: 2024-02-12 | Stop reason: HOSPADM

## 2024-02-12 RX ORDER — SUCCINYLCHOLINE/SOD CL,ISO/PF 200MG/10ML
SYRINGE (ML) INTRAVENOUS PRN
Status: DISCONTINUED | OUTPATIENT
Start: 2024-02-12 | End: 2024-02-12 | Stop reason: SDUPTHER

## 2024-02-12 RX ORDER — BUPIVACAINE HYDROCHLORIDE 2.5 MG/ML
INJECTION, SOLUTION EPIDURAL; INFILTRATION; INTRACAUDAL
Status: COMPLETED | OUTPATIENT
Start: 2024-02-12 | End: 2024-02-12

## 2024-02-12 RX ORDER — LIDOCAINE HYDROCHLORIDE 10 MG/ML
0.5 INJECTION, SOLUTION EPIDURAL; INFILTRATION; INTRACAUDAL; PERINEURAL ONCE
Status: DISCONTINUED | OUTPATIENT
Start: 2024-02-12 | End: 2024-02-12 | Stop reason: HOSPADM

## 2024-02-12 RX ORDER — SODIUM CHLORIDE 9 MG/ML
INJECTION, SOLUTION INTRAVENOUS PRN
Status: DISCONTINUED | OUTPATIENT
Start: 2024-02-12 | End: 2024-02-12 | Stop reason: HOSPADM

## 2024-02-12 RX ORDER — SODIUM CHLORIDE, SODIUM LACTATE, POTASSIUM CHLORIDE, CALCIUM CHLORIDE 600; 310; 30; 20 MG/100ML; MG/100ML; MG/100ML; MG/100ML
INJECTION, SOLUTION INTRAVENOUS CONTINUOUS
Status: DISCONTINUED | OUTPATIENT
Start: 2024-02-12 | End: 2024-02-13 | Stop reason: HOSPADM

## 2024-02-12 RX ORDER — FIBRINOGEN HUMAN AND THROMBIN HUMAN 1 ML
KIT TOPICAL
Status: COMPLETED | OUTPATIENT
Start: 2024-02-12 | End: 2024-02-12

## 2024-02-12 RX ORDER — FENTANYL CITRATE 50 UG/ML
INJECTION, SOLUTION INTRAMUSCULAR; INTRAVENOUS PRN
Status: DISCONTINUED | OUTPATIENT
Start: 2024-02-12 | End: 2024-02-12 | Stop reason: SDUPTHER

## 2024-02-12 RX ORDER — SODIUM CHLORIDE 0.9 % (FLUSH) 0.9 %
5-40 SYRINGE (ML) INJECTION PRN
Status: DISCONTINUED | OUTPATIENT
Start: 2024-02-12 | End: 2024-02-12 | Stop reason: HOSPADM

## 2024-02-12 RX ORDER — PROPOFOL 10 MG/ML
INJECTION, EMULSION INTRAVENOUS PRN
Status: DISCONTINUED | OUTPATIENT
Start: 2024-02-12 | End: 2024-02-12 | Stop reason: SDUPTHER

## 2024-02-12 RX ORDER — LABETALOL HYDROCHLORIDE 5 MG/ML
10 INJECTION, SOLUTION INTRAVENOUS
Status: DISCONTINUED | OUTPATIENT
Start: 2024-02-12 | End: 2024-02-13 | Stop reason: HOSPADM

## 2024-02-12 RX ORDER — HYOSCYAMINE SULFATE 0.5 MG/ML
250 INJECTION, SOLUTION SUBCUTANEOUS EVERY 4 HOURS PRN
Status: DISCONTINUED | OUTPATIENT
Start: 2024-02-12 | End: 2024-02-13 | Stop reason: HOSPADM

## 2024-02-12 RX ORDER — VECURONIUM BROMIDE 1 MG/ML
INJECTION, POWDER, LYOPHILIZED, FOR SOLUTION INTRAVENOUS PRN
Status: DISCONTINUED | OUTPATIENT
Start: 2024-02-12 | End: 2024-02-12 | Stop reason: SDUPTHER

## 2024-02-12 RX ORDER — DEXAMETHASONE SODIUM PHOSPHATE 4 MG/ML
INJECTION, SOLUTION INTRA-ARTICULAR; INTRALESIONAL; INTRAMUSCULAR; INTRAVENOUS; SOFT TISSUE PRN
Status: DISCONTINUED | OUTPATIENT
Start: 2024-02-12 | End: 2024-02-12 | Stop reason: SDUPTHER

## 2024-02-12 RX ORDER — KETAMINE HCL IN NACL, ISO-OSM 100MG/10ML
SYRINGE (ML) INJECTION PRN
Status: DISCONTINUED | OUTPATIENT
Start: 2024-02-12 | End: 2024-02-12 | Stop reason: SDUPTHER

## 2024-02-12 RX ORDER — DIPHENHYDRAMINE HYDROCHLORIDE 50 MG/ML
12.5 INJECTION INTRAMUSCULAR; INTRAVENOUS EVERY 6 HOURS PRN
Status: DISCONTINUED | OUTPATIENT
Start: 2024-02-12 | End: 2024-02-13 | Stop reason: HOSPADM

## 2024-02-12 RX ORDER — HYDROMORPHONE HYDROCHLORIDE 1 MG/ML
0.5 INJECTION, SOLUTION INTRAMUSCULAR; INTRAVENOUS; SUBCUTANEOUS
Status: DISCONTINUED | OUTPATIENT
Start: 2024-02-12 | End: 2024-02-13 | Stop reason: HOSPADM

## 2024-02-12 RX ORDER — SODIUM CHLORIDE, SODIUM LACTATE, POTASSIUM CHLORIDE, AND CALCIUM CHLORIDE .6; .31; .03; .02 G/100ML; G/100ML; G/100ML; G/100ML
IRRIGANT IRRIGATION
Status: COMPLETED | OUTPATIENT
Start: 2024-02-12 | End: 2024-02-12

## 2024-02-12 RX ORDER — ONDANSETRON 4 MG/1
4 TABLET, ORALLY DISINTEGRATING ORAL EVERY 8 HOURS PRN
Status: DISCONTINUED | OUTPATIENT
Start: 2024-02-12 | End: 2024-02-13 | Stop reason: HOSPADM

## 2024-02-12 RX ORDER — PHENYLEPHRINE HCL IN 0.9% NACL 1 MG/10 ML
SYRINGE (ML) INTRAVENOUS PRN
Status: DISCONTINUED | OUTPATIENT
Start: 2024-02-12 | End: 2024-02-12 | Stop reason: SDUPTHER

## 2024-02-12 RX ORDER — ONDANSETRON 2 MG/ML
INJECTION INTRAMUSCULAR; INTRAVENOUS PRN
Status: DISCONTINUED | OUTPATIENT
Start: 2024-02-12 | End: 2024-02-12 | Stop reason: SDUPTHER

## 2024-02-12 RX ORDER — SCOLOPAMINE TRANSDERMAL SYSTEM 1 MG/1
1 PATCH, EXTENDED RELEASE TRANSDERMAL
Status: DISCONTINUED | OUTPATIENT
Start: 2024-02-15 | End: 2024-02-13 | Stop reason: HOSPADM

## 2024-02-12 RX ORDER — GLYCOPYRROLATE 0.2 MG/ML
INJECTION INTRAMUSCULAR; INTRAVENOUS PRN
Status: DISCONTINUED | OUTPATIENT
Start: 2024-02-12 | End: 2024-02-12 | Stop reason: SDUPTHER

## 2024-02-12 RX ORDER — HYDROMORPHONE HYDROCHLORIDE 2 MG/ML
0.25 INJECTION, SOLUTION INTRAMUSCULAR; INTRAVENOUS; SUBCUTANEOUS EVERY 5 MIN PRN
Status: DISCONTINUED | OUTPATIENT
Start: 2024-02-12 | End: 2024-02-12 | Stop reason: HOSPADM

## 2024-02-12 RX ORDER — ONDANSETRON 2 MG/ML
4 INJECTION INTRAMUSCULAR; INTRAVENOUS EVERY 6 HOURS PRN
Status: DISCONTINUED | OUTPATIENT
Start: 2024-02-12 | End: 2024-02-13 | Stop reason: HOSPADM

## 2024-02-12 RX ORDER — DEXMEDETOMIDINE HYDROCHLORIDE 100 UG/ML
INJECTION, SOLUTION INTRAVENOUS PRN
Status: DISCONTINUED | OUTPATIENT
Start: 2024-02-12 | End: 2024-02-12 | Stop reason: SDUPTHER

## 2024-02-12 RX ORDER — EPHEDRINE SULFATE/0.9% NACL/PF 50 MG/5 ML
SYRINGE (ML) INTRAVENOUS PRN
Status: DISCONTINUED | OUTPATIENT
Start: 2024-02-12 | End: 2024-02-12 | Stop reason: SDUPTHER

## 2024-02-12 RX ORDER — NALOXONE HYDROCHLORIDE 0.4 MG/ML
INJECTION, SOLUTION INTRAMUSCULAR; INTRAVENOUS; SUBCUTANEOUS PRN
Status: DISCONTINUED | OUTPATIENT
Start: 2024-02-12 | End: 2024-02-13

## 2024-02-12 RX ORDER — SODIUM CHLORIDE 0.9 % (FLUSH) 0.9 %
5-40 SYRINGE (ML) INJECTION EVERY 12 HOURS SCHEDULED
Status: DISCONTINUED | OUTPATIENT
Start: 2024-02-12 | End: 2024-02-13 | Stop reason: HOSPADM

## 2024-02-12 RX ORDER — ACETAMINOPHEN 160 MG/5ML
650 LIQUID ORAL ONCE
Status: COMPLETED | OUTPATIENT
Start: 2024-02-12 | End: 2024-02-12

## 2024-02-12 RX ORDER — MAGNESIUM HYDROXIDE 1200 MG/15ML
LIQUID ORAL CONTINUOUS PRN
Status: COMPLETED | OUTPATIENT
Start: 2024-02-12 | End: 2024-02-12

## 2024-02-12 RX ORDER — SODIUM CHLORIDE 9 MG/ML
INJECTION, SOLUTION INTRAVENOUS CONTINUOUS
Status: DISCONTINUED | OUTPATIENT
Start: 2024-02-12 | End: 2024-02-12

## 2024-02-12 RX ORDER — LIDOCAINE HYDROCHLORIDE 10 MG/ML
1 INJECTION, SOLUTION EPIDURAL; INFILTRATION; INTRACAUDAL; PERINEURAL
Status: DISCONTINUED | OUTPATIENT
Start: 2024-02-12 | End: 2024-02-12 | Stop reason: HOSPADM

## 2024-02-12 RX ORDER — LABETALOL HYDROCHLORIDE 5 MG/ML
5 INJECTION, SOLUTION INTRAVENOUS
Status: DISCONTINUED | OUTPATIENT
Start: 2024-02-12 | End: 2024-02-12 | Stop reason: HOSPADM

## 2024-02-12 RX ORDER — ENOXAPARIN SODIUM 100 MG/ML
40 INJECTION SUBCUTANEOUS EVERY 12 HOURS SCHEDULED
Status: DISCONTINUED | OUTPATIENT
Start: 2024-02-13 | End: 2024-02-13 | Stop reason: HOSPADM

## 2024-02-12 RX ORDER — LIDOCAINE HYDROCHLORIDE 20 MG/ML
INJECTION, SOLUTION EPIDURAL; INFILTRATION; INTRACAUDAL; PERINEURAL PRN
Status: DISCONTINUED | OUTPATIENT
Start: 2024-02-12 | End: 2024-02-12 | Stop reason: SDUPTHER

## 2024-02-12 RX ORDER — PROMETHAZINE HYDROCHLORIDE 25 MG/ML
6.25 INJECTION, SOLUTION INTRAMUSCULAR; INTRAVENOUS EVERY 6 HOURS PRN
Status: DISCONTINUED | OUTPATIENT
Start: 2024-02-12 | End: 2024-02-13 | Stop reason: HOSPADM

## 2024-02-12 RX ORDER — METOCLOPRAMIDE HYDROCHLORIDE 5 MG/ML
10 INJECTION INTRAMUSCULAR; INTRAVENOUS EVERY 6 HOURS PRN
Status: DISCONTINUED | OUTPATIENT
Start: 2024-02-12 | End: 2024-02-13 | Stop reason: HOSPADM

## 2024-02-12 RX ORDER — SODIUM CHLORIDE 9 MG/ML
INJECTION, SOLUTION INTRAVENOUS PRN
Status: DISCONTINUED | OUTPATIENT
Start: 2024-02-12 | End: 2024-02-13 | Stop reason: HOSPADM

## 2024-02-12 RX ORDER — SODIUM CHLORIDE 9 MG/ML
INJECTION, SOLUTION INTRAVENOUS CONTINUOUS
Status: DISCONTINUED | OUTPATIENT
Start: 2024-02-12 | End: 2024-02-12 | Stop reason: HOSPADM

## 2024-02-12 RX ORDER — MAGNESIUM SULFATE HEPTAHYDRATE 500 MG/ML
INJECTION, SOLUTION INTRAMUSCULAR; INTRAVENOUS PRN
Status: DISCONTINUED | OUTPATIENT
Start: 2024-02-12 | End: 2024-02-12 | Stop reason: SDUPTHER

## 2024-02-12 RX ORDER — LIDOCAINE 4 G/G
1 PATCH TOPICAL DAILY
Status: DISCONTINUED | OUTPATIENT
Start: 2024-02-12 | End: 2024-02-13 | Stop reason: HOSPADM

## 2024-02-12 RX ORDER — HYDRALAZINE HYDROCHLORIDE 20 MG/ML
10 INJECTION INTRAMUSCULAR; INTRAVENOUS
Status: DISCONTINUED | OUTPATIENT
Start: 2024-02-12 | End: 2024-02-13 | Stop reason: HOSPADM

## 2024-02-12 RX ORDER — ONDANSETRON 2 MG/ML
4 INJECTION INTRAMUSCULAR; INTRAVENOUS
Status: DISCONTINUED | OUTPATIENT
Start: 2024-02-12 | End: 2024-02-12 | Stop reason: HOSPADM

## 2024-02-12 RX ORDER — SODIUM CHLORIDE 0.9 % (FLUSH) 0.9 %
5-40 SYRINGE (ML) INJECTION PRN
Status: DISCONTINUED | OUTPATIENT
Start: 2024-02-12 | End: 2024-02-13 | Stop reason: HOSPADM

## 2024-02-12 RX ADMIN — ACETAMINOPHEN 650 MG: 650 SOLUTION ORAL at 10:21

## 2024-02-12 RX ADMIN — PROPOFOL 50 MG: 10 INJECTION, EMULSION INTRAVENOUS at 13:11

## 2024-02-12 RX ADMIN — HYDROMORPHONE HYDROCHLORIDE 0.5 MG: 2 INJECTION, SOLUTION INTRAMUSCULAR; INTRAVENOUS; SUBCUTANEOUS at 14:15

## 2024-02-12 RX ADMIN — GLYCOPYRROLATE 0.2 MG: 0.2 INJECTION INTRAMUSCULAR; INTRAVENOUS at 12:17

## 2024-02-12 RX ADMIN — SODIUM CHLORIDE, POTASSIUM CHLORIDE, SODIUM LACTATE AND CALCIUM CHLORIDE: 600; 310; 30; 20 INJECTION, SOLUTION INTRAVENOUS at 13:47

## 2024-02-12 RX ADMIN — SODIUM CHLORIDE 3000 MG: 900 INJECTION INTRAVENOUS at 13:48

## 2024-02-12 RX ADMIN — FENTANYL CITRATE 50 MCG: 50 INJECTION, SOLUTION INTRAMUSCULAR; INTRAVENOUS at 13:07

## 2024-02-12 RX ADMIN — LIDOCAINE HYDROCHLORIDE 0.5 ML: 10 INJECTION, SOLUTION EPIDURAL; INFILTRATION; INTRACAUDAL; PERINEURAL at 14:53

## 2024-02-12 RX ADMIN — VECURONIUM BROMIDE 10 MG: 1 INJECTION, POWDER, LYOPHILIZED, FOR SOLUTION INTRAVENOUS at 11:50

## 2024-02-12 RX ADMIN — SODIUM CHLORIDE: 9 INJECTION, SOLUTION INTRAVENOUS at 11:38

## 2024-02-12 RX ADMIN — FENTANYL CITRATE 50 MCG: 50 INJECTION, SOLUTION INTRAMUSCULAR; INTRAVENOUS at 11:42

## 2024-02-12 RX ADMIN — Medication 80 MG: at 11:42

## 2024-02-12 RX ADMIN — SODIUM CHLORIDE, PRESERVATIVE FREE 40 MG: 5 INJECTION INTRAVENOUS at 16:08

## 2024-02-12 RX ADMIN — PROPOFOL 200 MG: 10 INJECTION, EMULSION INTRAVENOUS at 11:42

## 2024-02-12 RX ADMIN — Medication 10 MG: at 12:34

## 2024-02-12 RX ADMIN — SUGAMMADEX 200 MG: 100 INJECTION, SOLUTION INTRAVENOUS at 13:04

## 2024-02-12 RX ADMIN — Medication 25 MG: at 12:58

## 2024-02-12 RX ADMIN — MAGNESIUM SULFATE HEPTAHYDRATE 1 G: 500 INJECTION, SOLUTION INTRAMUSCULAR; INTRAVENOUS at 11:50

## 2024-02-12 RX ADMIN — LIDOCAINE HYDROCHLORIDE 100 MG: 20 INJECTION, SOLUTION EPIDURAL; INFILTRATION; INTRACAUDAL; PERINEURAL at 11:42

## 2024-02-12 RX ADMIN — VECURONIUM BROMIDE 5 MG: 1 INJECTION, POWDER, LYOPHILIZED, FOR SOLUTION INTRAVENOUS at 12:36

## 2024-02-12 RX ADMIN — SODIUM CHLORIDE 1000 ML: 9 INJECTION, SOLUTION INTRAVENOUS at 13:28

## 2024-02-12 RX ADMIN — DEXMEDETOMIDINE HYDROCHLORIDE 10 MCG: 100 INJECTION, SOLUTION INTRAVENOUS at 11:50

## 2024-02-12 RX ADMIN — ENOXAPARIN SODIUM 40 MG: 100 INJECTION SUBCUTANEOUS at 10:21

## 2024-02-12 RX ADMIN — Medication 200 MCG: at 12:53

## 2024-02-12 RX ADMIN — SODIUM CHLORIDE, POTASSIUM CHLORIDE, SODIUM LACTATE AND CALCIUM CHLORIDE: 600; 310; 30; 20 INJECTION, SOLUTION INTRAVENOUS at 16:30

## 2024-02-12 RX ADMIN — APREPITANT 32 MG: 32 EMULSION INTRAVENOUS at 10:21

## 2024-02-12 RX ADMIN — SODIUM CHLORIDE: 9 INJECTION, SOLUTION INTRAVENOUS at 10:20

## 2024-02-12 RX ADMIN — DEXMEDETOMIDINE HYDROCHLORIDE 10 MCG: 100 INJECTION, SOLUTION INTRAVENOUS at 12:39

## 2024-02-12 RX ADMIN — HYDRALAZINE HYDROCHLORIDE 10 MG: 20 INJECTION, SOLUTION INTRAMUSCULAR; INTRAVENOUS at 16:19

## 2024-02-12 RX ADMIN — GLYCOPYRROLATE 0.2 MG: 0.2 INJECTION INTRAMUSCULAR; INTRAVENOUS at 12:32

## 2024-02-12 RX ADMIN — SODIUM CHLORIDE, POTASSIUM CHLORIDE, SODIUM LACTATE AND CALCIUM CHLORIDE: 600; 310; 30; 20 INJECTION, SOLUTION INTRAVENOUS at 13:48

## 2024-02-12 RX ADMIN — Medication: at 14:02

## 2024-02-12 RX ADMIN — Medication 200 MCG: at 12:17

## 2024-02-12 RX ADMIN — ONDANSETRON 4 MG: 2 INJECTION INTRAMUSCULAR; INTRAVENOUS at 11:50

## 2024-02-12 RX ADMIN — Medication 25 MG: at 12:05

## 2024-02-12 RX ADMIN — SODIUM CHLORIDE: 9 INJECTION, SOLUTION INTRAVENOUS at 12:49

## 2024-02-12 RX ADMIN — DEXAMETHASONE SODIUM PHOSPHATE 8 MG: 4 INJECTION, SOLUTION INTRAMUSCULAR; INTRAVENOUS at 11:50

## 2024-02-12 RX ADMIN — Medication 200 MCG: at 12:32

## 2024-02-12 NOTE — OP NOTE
Operative Note      Patient: Charlene Cid  YOB: 1970  MRN: [id number]    Date of Procedure: 2/12/2024      [facility]   [facility]  [address] [place] [address], [address] [place] [id number] .  Phone: [phone]  Fax: [phone]             Procedure Note    Indications: The patient was evaluated by our multidisciplinary team and was found to be a good candidate for weight loss surgery for future weight loss. Body mass index is 45.96 kg/m²..   Risks and benefits explained and Charlene Cid wants to proceed.         Pre-operative Diagnosis:   Patient Active Problem List    Diagnosis Date Noted    Ventral hernia without obstruction or gangrene 02/12/2024    Chronic GERD 10/19/2023    Allergic rhinitis due to allergen 07/07/2023    Former cigarette smoker 01/31/2022    Family history of heart disease 01/31/2022    Lower urinary tract symptoms (LUTS) - suspected BPH, first discussed 1/2022 01/31/2022    Obstructive sleep apnea syndrome with CPAP 10/13/2016    Morbid obesity with BMI of 45.0-49.9, adult (HCC) 04/29/2016    Essential hypertension 04/29/2016     No change in meds, no c/o with meds, no chest pain, SOB, palpatations, or syncope. Home bp was good.           Post-operative Diagnosis:   Same      Procedure:    - Laparoscopic Sleeve Gastrectomy.  - Laparoscopic 1ry Ventral hernia Repair.      Surgeon: Sabrina Villanueva MD, FACS, [facility].    Anesthesia: General endotracheal anesthesia        Procedure Details   The patient was seen again in the Holding Room. The risks, benefits, complications, treatment options, and expected outcomes were discussed with the patient and/or family.  Both open and laparoscopic approach were explained in details. Benefits and risks explained. Informed consent obtained.   Risks including but not limited to;Infection, bleeding, gastric leak or obstruction, persistent nausea, vomiting, or reflux, injury to surrounding structures, risks

## 2024-02-12 NOTE — ANESTHESIA PRE PROCEDURE
10:08 AM    RBC 4.63 12/26/2023 10:08 AM    HGB 13.8 12/26/2023 10:08 AM    HCT 40.0 12/26/2023 10:08 AM    MCV 86.5 12/26/2023 10:08 AM    RDW 14.6 12/26/2023 10:08 AM     12/26/2023 10:08 AM       CMP:   Lab Results   Component Value Date/Time     12/26/2023 10:08 AM    K 4.2 12/26/2023 10:08 AM    K 3.6 10/06/2020 04:11 AM     12/26/2023 10:08 AM    CO2 29 12/26/2023 10:08 AM    BUN 14 12/26/2023 10:08 AM    CREATININE 0.9 12/26/2023 10:08 AM    GFRAA >60 02/21/2022 10:24 AM    GFRAA >60 01/27/2012 11:40 AM    AGRATIO 1.6 12/26/2023 10:08 AM    LABGLOM >60 12/26/2023 10:08 AM    GLUCOSE 95 12/26/2023 10:08 AM    PROT 7.2 12/26/2023 10:08 AM    PROT 6.9 01/27/2012 11:40 AM    CALCIUM 8.7 12/26/2023 10:08 AM    BILITOT 0.6 12/26/2023 10:08 AM    ALKPHOS 89 12/26/2023 10:08 AM    AST 13 12/26/2023 10:08 AM    ALT 15 12/26/2023 10:08 AM       POC Tests:   Recent Labs     02/12/24  1017   POCGLU 82       Coags:   Lab Results   Component Value Date/Time    PROTIME 13.7 12/26/2023 10:08 AM    INR 1.04 12/26/2023 10:08 AM    APTT 29.5 10/05/2020 07:49 PM       HCG (If Applicable): No results found for: \"PREGTESTUR\", \"PREGSERUM\", \"HCG\", \"HCGQUANT\"     ABGs: No results found for: \"PHART\", \"PO2ART\", \"YUO8DSU\", \"NOK2DQY\", \"BEART\", \"W7CGGXCF\"     Type & Screen (If Applicable):  No results found for: \"LABABO\", \"LABRH\"    Drug/Infectious Status (If Applicable):  No results found for: \"HIV\", \"HEPCAB\"    COVID-19 Screening (If Applicable):   Lab Results   Component Value Date/Time    COVID19 NOT DETECTED 11/10/2020 01:35 PM           Anesthesia Evaluation  Patient summary reviewed and Nursing notes reviewed   no history of anesthetic complications:   Airway: Mallampati: III  TM distance: >3 FB   Neck ROM: full  Mouth opening: > = 3 FB   Dental: normal exam         Pulmonary:   (+)     sleep apnea: on CPAP,           (-) wheezes                          ROS comment: Former smoker   Cardiovascular:    (+)

## 2024-02-12 NOTE — PROGRESS NOTES
Met with patient in pre op bay. Discussed post op expectations and goals. Instructed pt on pain med and nausea meds. Instructed pt regarding post op expectation to ambulate once he is awake and within 2 hours of arriving to inpt unit.  Discussed plan for UGI tomorrow am and once results have been read, the bariatric team will see patient and start on fluids. Continued to stress the post op goals of ambulation and hydration.  Patient verbalized understanding and agrees with plan of care.

## 2024-02-12 NOTE — ANESTHESIA POSTPROCEDURE EVALUATION
Department of Anesthesiology  Postprocedure Note    Patient: Charlene Cid  MRN: [id number]  YOB: 1970  Date of evaluation: 2/12/2024    Procedure Summary       Date: 02/12/24 Room / Location: [address]    Anesthesia Start: 1138 Anesthesia Stop: 1425    Procedure: LAPAROSCOPIC SLEEVE GASTRECTOMY AND VENTRAL HERNIA REPAIR (Abdomen) Diagnosis:       Morbid obesity (HCC)      (Morbid obesity (HCC) [E66.01])    Surgeons: Sabrina Villanueva MD Responsible Provider:     Anesthesia Type: general ASA Status: 3            Anesthesia Type: No value filed.    Sukumar Phase I: Sukumar Score: 9    Sukumar Phase II:      Anesthesia Post Evaluation    Patient location during evaluation: PACU  Patient participation: complete - patient participated  Level of consciousness: awake  Airway patency: patent  Nausea & Vomiting: no vomiting  Cardiovascular status: hemodynamically stable  Respiratory status: acceptable  Hydration status: euvolemic  Multimodal analgesia pain management approach    No notable events documented.

## 2024-02-12 NOTE — PROGRESS NOTES
Pt awake and oriented, pain controlled, dilaudid PCA in reach, abd incisions CD&I, pt was able to void via urinal. Vss, phase 1 discharge criteria met

## 2024-02-12 NOTE — PROGRESS NOTES
Pt arrived from OR, report from crna/rn. Pt had lap sleeve gastrectomy, ventral hernia repair. Surgical incisions x 5 well approximated with glue, no swelling/ bruising/ drainage noted, abdomen soft and rounded, ice pack placed. Pt asleep non responsive upon arrival to pacu, respirations even unlabored, simple mask 6 liters in place, oral airway in place. VSS.

## 2024-02-12 NOTE — H&P
[facility]   [facility]  Sabrina Villanueva MD, FACS, [address], [address]    [facility] [address] .  Phone: [phone]  Fax: [phone]            Patient's History and Physical from February 5, 2024 was reviewed.    Charlene seen and examined.    There has been no change.        Patient Active Problem List    Diagnosis Date Noted    Chronic GERD 10/19/2023    Allergic rhinitis due to allergen 07/07/2023    Former cigarette smoker 01/31/2022    Family history of heart disease 01/31/2022    Lower urinary tract symptoms (LUTS) - suspected BPH, first discussed 1/2022 01/31/2022    Obstructive sleep apnea syndrome with CPAP 10/13/2016    Morbid obesity with BMI of 45.0-49.9, adult (HCC) 04/29/2016    Essential hypertension 04/29/2016     No change in meds, no c/o with meds, no chest pain, SOB, palpatations, or syncope. Home bp was good.               HISTORY OF PRESENT ILLNESS:    Charlene Cid is a very pleasant 53 y.o. with Body mass index is 45.96 kg/m². who is presenting for planned Laparoscopic Sleeve Gastrectomy for future weight loss.       Past Medical History:        Diagnosis Date    Essential hypertension     Severe obstructive sleep apnea     cpap     Past Surgical History:        Procedure Laterality Date    APPENDECTOMY      COLONOSCOPY N/A 01/20/2020    COLONOSCOPY W/ ANESTHESIA performed by Reinaldo Lozada MD at [facility] ENDOSCOPY    KNEE ARTHROSCOPY Bilateral     SEPTOPLASTY Bilateral 11/18/2022    BILATERAL INFERIOR TURBINATE REDUCTION AND SEPTOPLASTY performed by Jonah Pires MD at [facility] OR    UPPER GASTROINTESTINAL ENDOSCOPY N/A 12/1/2023    EGD BIOPSY performed by Sabrina Villanueva MD at [facility] ENDOSCOPY     Medications Prior to Admission:   Medications Prior to Admission: omeprazole (PRILOSEC) 20 MG delayed release capsule, Take 1 capsule by mouth every morning (before breakfast)    Allergies:  Other    Social History:   TOBACCO:

## 2024-02-13 ENCOUNTER — APPOINTMENT (OUTPATIENT)
Dept: GENERAL RADIOLOGY | Age: 54
DRG: 621 | End: 2024-02-13
Attending: SURGERY
Payer: COMMERCIAL

## 2024-02-13 VITALS
HEIGHT: 68 IN | BODY MASS INDEX: 47.13 KG/M2 | HEART RATE: 79 BPM | RESPIRATION RATE: 16 BRPM | OXYGEN SATURATION: 97 % | SYSTOLIC BLOOD PRESSURE: 149 MMHG | DIASTOLIC BLOOD PRESSURE: 84 MMHG | WEIGHT: 311 LBS | TEMPERATURE: 98.5 F

## 2024-02-13 LAB
ANION GAP SERPL CALCULATED.3IONS-SCNC: 17 MMOL/L (ref 3–16)
BUN SERPL-MCNC: 11 MG/DL (ref 7–20)
CALCIUM SERPL-MCNC: 9 MG/DL (ref 8.3–10.6)
CHLORIDE SERPL-SCNC: 102 MMOL/L (ref 99–110)
CO2 SERPL-SCNC: 20 MMOL/L (ref 21–32)
CREAT SERPL-MCNC: 1 MG/DL (ref 0.9–1.3)
DEPRECATED RDW RBC AUTO: 14.4 % (ref 12.4–15.4)
GFR SERPLBLD CREATININE-BSD FMLA CKD-EPI: >60 ML/MIN/{1.73_M2}
GLUCOSE SERPL-MCNC: 94 MG/DL (ref 70–99)
HCT VFR BLD AUTO: 39.5 % (ref 40.5–52.5)
HGB BLD-MCNC: 13.3 G/DL (ref 13.5–17.5)
MCH RBC QN AUTO: 29.3 PG (ref 26–34)
MCHC RBC AUTO-ENTMCNC: 33.6 G/DL (ref 31–36)
MCV RBC AUTO: 87.2 FL (ref 80–100)
PLATELET # BLD AUTO: 256 K/UL (ref 135–450)
PMV BLD AUTO: 9.3 FL (ref 5–10.5)
POTASSIUM SERPL-SCNC: 3.9 MMOL/L (ref 3.5–5.1)
RBC # BLD AUTO: 4.53 M/UL (ref 4.2–5.9)
SODIUM SERPL-SCNC: 139 MMOL/L (ref 136–145)
WBC # BLD AUTO: 11.5 K/UL (ref 4–11)

## 2024-02-13 PROCEDURE — APPSS180 APP SPLIT SHARED TIME > 60 MINUTES: Performed by: NURSE PRACTITIONER

## 2024-02-13 PROCEDURE — 2580000003 HC RX 258: Performed by: SURGERY

## 2024-02-13 PROCEDURE — 6370000000 HC RX 637 (ALT 250 FOR IP): Performed by: SURGERY

## 2024-02-13 PROCEDURE — C9113 INJ PANTOPRAZOLE SODIUM, VIA: HCPCS | Performed by: SURGERY

## 2024-02-13 PROCEDURE — 6360000004 HC RX CONTRAST MEDICATION

## 2024-02-13 PROCEDURE — 99024 POSTOP FOLLOW-UP VISIT: CPT | Performed by: NURSE PRACTITIONER

## 2024-02-13 PROCEDURE — 85027 COMPLETE CBC AUTOMATED: CPT

## 2024-02-13 PROCEDURE — 74240 X-RAY XM UPR GI TRC 1CNTRST: CPT

## 2024-02-13 PROCEDURE — 6360000002 HC RX W HCPCS: Performed by: SURGERY

## 2024-02-13 PROCEDURE — 80048 BASIC METABOLIC PNL TOTAL CA: CPT

## 2024-02-13 PROCEDURE — 6360000002 HC RX W HCPCS: Performed by: NURSE PRACTITIONER

## 2024-02-13 RX ORDER — OXYCODONE HYDROCHLORIDE AND ACETAMINOPHEN 5; 325 MG/1; MG/1
2 TABLET ORAL EVERY 4 HOURS PRN
Status: DISCONTINUED | OUTPATIENT
Start: 2024-02-13 | End: 2024-02-13 | Stop reason: HOSPADM

## 2024-02-13 RX ORDER — ONDANSETRON 8 MG/1
8 TABLET, ORALLY DISINTEGRATING ORAL EVERY 8 HOURS PRN
Qty: 30 TABLET | Refills: 0 | Status: SHIPPED | OUTPATIENT
Start: 2024-02-13

## 2024-02-13 RX ORDER — ONDANSETRON 8 MG/1
8 TABLET, ORALLY DISINTEGRATING ORAL EVERY 8 HOURS PRN
Qty: 30 TABLET | Refills: 1 | Status: SHIPPED | OUTPATIENT
Start: 2024-02-13

## 2024-02-13 RX ORDER — OXYCODONE HYDROCHLORIDE AND ACETAMINOPHEN 5; 325 MG/1; MG/1
1 TABLET ORAL EVERY 4 HOURS PRN
Status: DISCONTINUED | OUTPATIENT
Start: 2024-02-13 | End: 2024-02-13 | Stop reason: HOSPADM

## 2024-02-13 RX ORDER — PROMETHAZINE HYDROCHLORIDE 6.25 MG/5ML
12.5 SYRUP ORAL EVERY 6 HOURS PRN
Status: DISCONTINUED | OUTPATIENT
Start: 2024-02-13 | End: 2024-02-13 | Stop reason: HOSPADM

## 2024-02-13 RX ORDER — OXYCODONE HYDROCHLORIDE AND ACETAMINOPHEN 5; 325 MG/1; MG/1
1 TABLET ORAL EVERY 6 HOURS PRN
Qty: 28 TABLET | Refills: 0 | Status: SHIPPED | OUTPATIENT
Start: 2024-02-13 | End: 2024-02-20

## 2024-02-13 RX ORDER — KETOROLAC TROMETHAMINE 30 MG/ML
15 INJECTION, SOLUTION INTRAMUSCULAR; INTRAVENOUS EVERY 6 HOURS
Status: COMPLETED | OUTPATIENT
Start: 2024-02-13 | End: 2024-02-13

## 2024-02-13 RX ADMIN — Medication 10 ML: at 08:31

## 2024-02-13 RX ADMIN — SODIUM CHLORIDE, PRESERVATIVE FREE 40 MG: 5 INJECTION INTRAVENOUS at 08:23

## 2024-02-13 RX ADMIN — HYDRALAZINE HYDROCHLORIDE 10 MG: 20 INJECTION, SOLUTION INTRAMUSCULAR; INTRAVENOUS at 02:43

## 2024-02-13 RX ADMIN — IOHEXOL 50 ML: 350 INJECTION, SOLUTION INTRAVENOUS at 09:29

## 2024-02-13 RX ADMIN — KETOROLAC TROMETHAMINE 15 MG: 30 INJECTION, SOLUTION INTRAMUSCULAR; INTRAVENOUS at 11:31

## 2024-02-13 RX ADMIN — ENOXAPARIN SODIUM 40 MG: 100 INJECTION SUBCUTANEOUS at 02:42

## 2024-02-13 NOTE — CARE COORDINATION
Discharge Planning Note:    Chart reviewed and it appears that patient has minimal needs for discharge at this time. Discussed with patient’s nurse and requested that case management be notified if discharge needs are identified.     - Current discharge plan is for the patient to return home.    Case management will continue to follow progress and update discharge plan as needed.      Risk of Readmission Score: 5%    DELICIA Tang RN    [facility]  Phone: [phone]

## 2024-02-13 NOTE — PROGRESS NOTES
[facility]   General & Laparoscopic Surgery  [facility]       Pt seen and examined    S/p laparoscopic sleeve gastrectomy & 1ry ventral hernia repair.    The patient is ambulating in carroll. Pain is well controlled. There is no nausea and/or vomiting. Patient received two doses of PRN IV Hydralazine overnight with good result. There are no other complaints or questions.     Vitals:    02/13/24 0230 02/13/24 0445 02/13/24 0502 02/13/24 0821   BP: (!) 166/80 138/75 (!) 142/83 (!) 141/82   Pulse:   69 86   Resp:   19 16   Temp:   98.8 °F (37.1 °C) 98.3 °F (36.8 °C)   TempSrc:   Oral Oral   SpO2: 97% 98% 100% 97%   Weight:       Height:         Abdomen is soft, appropriately tender, incisions stable with no erythema.   Breath sounds are clear bilaterally.  Bowel sounds are hypoactive in all quadrants.    Data  CBC:   Lab Results   Component Value Date/Time    WBC 11.5 02/13/2024 05:08 AM    RBC 4.53 02/13/2024 05:08 AM    HGB 13.3 02/13/2024 05:08 AM    HCT 39.5 02/13/2024 05:08 AM    MCV 87.2 02/13/2024 05:08 AM    MCH 29.3 02/13/2024 05:08 AM    MCHC 33.6 02/13/2024 05:08 AM    RDW 14.4 02/13/2024 05:08 AM     02/13/2024 05:08 AM    MPV 9.3 02/13/2024 05:08 AM     BMP:    Lab Results   Component Value Date/Time     02/13/2024 05:08 AM    K 3.9 02/13/2024 05:08 AM    K 3.6 10/06/2020 04:11 AM     02/13/2024 05:08 AM    CO2 20 02/13/2024 05:08 AM    BUN 11 02/13/2024 05:08 AM    LABALBU 4.4 12/26/2023 10:08 AM    CREATININE 1.0 02/13/2024 05:08 AM    CALCIUM 9.0 02/13/2024 05:08 AM    GFRAA >60 02/21/2022 10:24 AM    GFRAA >60 01/27/2012 11:40 AM    LABGLOM >60 02/13/2024 05:08 AM    GLUCOSE 94 02/13/2024 05:08 AM     Current Inpatient Medications    Current Facility-Administered Medications: diphenhydrAMINE (BENADRYL) injection 12.5 mg, 12.5 mg, IntraVENous, Q6H PRN  hydrALAZINE (APRESOLINE) injection 10 mg, 10 mg, IntraVENous, Q2H PRN  hyoscyamine (LEVSIN) 500 MCG/ML

## 2024-02-13 NOTE — DISCHARGE INSTRUCTIONS
Unless otherwise noted you will crush all your medications for the first 2 weeks post op and mix them with clears for the first four days and then with your pureed food for the remainder of the 2 weeks. If you do not tolerate your medications crushed you may quarter or cut in half and take one piece at a time. You may take your omeprazole (Prilosec) whole as it cannot be opened. After two weeks, you may start taking all your pills whole. Please make sure when taking whole pills, you do not take them all at once. Take them one at a time and allow a minute or so between each one.    Activity  You are encouraged to walk through the entire postoperative period as this will help with preventing clots, pneumonia and constipation. You are restricted from lifting anything greater than 10 lbs for the first 6 weeks after surgery. You may shower starting on postoperative day #2 (second day after surgery), but should not get into baths, pools and/or hot tubs until the provider releases you to do so.    Driving  You should not drive for at least the first week after your surgery and/or if you are still taking pain medication. Most patients generally drive to their 2-week postoperative appointment.    Constipation  Constipation can occur following surgery. This is due to anesthesia, decreased fluids and fiber in your diet, drinking protein shakes and taking pain medication. Make sure you are getting 48-64 ounces of fluids per day and walking. If you develop constipation you may use docusate sodium (Colace) or Dulcolax which are over-the-counter stool softeners and can be taken 1-2 times per day. If the stool softeners do not help in 2-3 days you can use Miralax which is an over-the-counter laxative and can be taken daily. If this does not help please call the office for further instructions.    Pain Management  Following surgery, you will have some pain related to your incisions and from the gas instilled into your abdomen during

## 2024-02-13 NOTE — PROGRESS NOTES
20:34 Assessment complete. Vss. Pt refused CPAP at this time. The care plan and education has been reviewed and mutually agreed upon with the patient.     06:20 Call to lab to make sure pt's lab was drawn this Am. Lab confirm that \" It was done at 05:08 AM\"    Helio Jain RN

## 2024-02-13 NOTE — PLAN OF CARE
Problem: Discharge Planning  Goal: Discharge to home or other facility with appropriate resources  2/13/2024 0832 by Tran Bullock RN  Outcome: Progressing  2/12/2024 2034 by Helio Jain RN  Outcome: Progressing     Problem: Pain  Goal: Verbalizes/displays adequate comfort level or baseline comfort level  2/13/2024 0832 by Tran Bullock RN  Outcome: Progressing  2/12/2024 2034 by Helio Jain RN  Outcome: Progressing     Problem: Safety - Adult  Goal: Free from fall injury  Outcome: Progressing

## 2024-02-13 NOTE — PROGRESS NOTES
CLINICAL PHARMACY NOTE: MEDS TO BEDS    Total # of Prescriptions Filled: 2   The following medications were delivered to the patient:  OXYCODONE-ACETAMINOPHEN 5  ONDANSETRON 8MG    Additional Documentation:  Delivered to patients room = signed  Ok to be delivered per WANDER Mcpherson - Pharmacy Tech.

## 2024-02-13 NOTE — DISCHARGE SUMMARY
The patient was admitted on day of surgery and underwent a laparoscopic sleeve gastrectomy & 1ry ventral hernia repair. Surgery went well and the patient went to our post-op bariatric floor. Overnight, the patient had stable vitals signs, good urine output and ambulated in the halls. On POD #1 the patient underwent an UGI which revealed no leak or obstruction. Phase I diet was started and the patient tolerated well. The patient has no N/V, tolerating diet, ambulating and pain controlled on oral medication.    The patient was discharged home today in stable condition. The patient will f/u in 2 weeks and was given dietary and ambulation instruction. The patient was instructed to call if there are any questions or concerns.     Patient Active Problem List    Diagnosis Date Noted    Ventral hernia without obstruction or gangrene 02/12/2024    S/P laparoscopic sleeve gastrectomy 02/12/2024    Chronic GERD 10/19/2023    Allergic rhinitis due to allergen 07/07/2023    Former cigarette smoker 01/31/2022    Family history of heart disease 01/31/2022    Lower urinary tract symptoms (LUTS) - suspected BPH, first discussed 1/2022 01/31/2022    Obstructive sleep apnea syndrome with CPAP 10/13/2016    Morbid obesity with BMI of 45.0-49.9, adult (HCC) 04/29/2016    Essential hypertension 04/29/2016     No change in meds, no c/o with meds, no chest pain, SOB, palpatations, or syncope. Home bp was good.

## 2024-02-14 ENCOUNTER — CARE COORDINATION (OUTPATIENT)
Dept: CASE MANAGEMENT | Age: 54
End: 2024-02-14

## 2024-02-14 NOTE — CARE COORDINATION
Care Transitions Outreach Attempt    Call within 2 business days of discharge: Yes     Attempted to reach patient for transitions of care follow up. Unable to reach patient.    Patient: Charlene Cid Patient : 1970 MRN: [id number]    Last Discharge Facility       Date Complaint Diagnosis Description Type Department Provider    24  S/P laparoscopic sleeve gastrectomy ... Admission (Discharged) [facility] Sabrina Villanueva MD              Was this an external facility discharge? No Discharge Facility Name:     Noted following upcoming appointments from discharge chart review:   [facility] follow up appointment(s):   Future Appointments   Date Time Provider Department Center   2024  1:15 PM Sabrina Villanueva MD [facility]   2024 10:40 AM Femi Roy DO [facility]     Non-[facility]  follow up appointment(s):

## 2024-02-15 ENCOUNTER — CARE COORDINATION (OUTPATIENT)
Dept: CASE MANAGEMENT | Age: 54
End: 2024-02-15

## 2024-02-15 NOTE — CARE COORDINATION
Care Transitions Outreach Attempt    Call within 2 business days of discharge: Yes     Attempted to reach patient for transitions of care follow up. Unable to reach patient x 2. Pt has appt with surgeon, this nurse will message PCP office regarding a f/u appt. .     Patient: Charlene Cid Patient : 1970 MRN: [id number]    Last Discharge Facility       Date Complaint Diagnosis Description Type Department Provider    24  S/P laparoscopic sleeve gastrectomy ... Admission (Discharged) [facility]T Sabrina Villanueva MD              Was this an external facility discharge? No Discharge Facility Name:     Noted following upcoming appointments from discharge chart review:   [facility] follow up appointment(s):   Future Appointments   Date Time Provider Department Center   2024  1:15 PM Sabrina Villanueva MD [facility]   2024 10:40 AM Femi Roy DO [place]     Non-[facility]  follow up appointment(s):

## 2024-02-19 ENCOUNTER — TELEPHONE (OUTPATIENT)
Dept: BARIATRICS/WEIGHT MGMT | Age: 54
End: 2024-02-19

## 2024-02-19 NOTE — TELEPHONE ENCOUNTER
Looks like patient is doing well with intake. Agree with recommendations and next follow up can be at two week post-op visit.  Thank you,  JASSI NguyenC

## 2024-02-19 NOTE — TELEPHONE ENCOUNTER
Surgery Type: laparoscopic sleeve gastrectomy & 1ry ventral hernia repair     Surgery Date: 2/12/2024    Surgeon: Dr. Villanueva    The patient was contacted to follow up on their recent bariatric surgery. The following topics were reviewed:    [x] Hydration is Adequate -water, [place]          [x] Patient is getting at least 48-64 oz of fluids a day, not including protein shakes.    [x]Consuming Adequate Protein         [x] Consuming 2 protein shakes a day         [x] Consuming equal to 60-80 grams of protein a day    Mixing protein powder with 8 ounces of  almond milk .    [x] Food intake is  appropriate istant mashed potatoes, chicken pureed  [x] Adequately pureeing foods, so that there are no chunks left.  [x] Taking in 1-2 oz at a time  [x] Pt is eating 2 times per day  [x] Following the 30-30-30 rule.   - Patient is eating pureed food over 30 minute timeframe.    - Patient is  fluids out from food by 30 minutes.   [x] Multivitamin capsule started  [x] Reminded patient to keep food diary to bring to their 2 week follow up appointment.     [] Pain relief techniques utilized  [x] Taking pain medication as prescribed  [] Utilizing Lidoderm patches (if prescribed)- instructions given to use as needed  [] Taking Tylenol instead of prescription pain medication- instructions given  [x] Wearing abdominal binder  [x] Using ice for incisional pain    [x] Activity is appropriate  [x] Walking 10 minutes out of every hour  [x] Avoiding heavy lifting (>10lbs)  [x] Utilizing their incentive spirometer   [x]  is using 10 times per hour while awake    [] Issues with Nausea/Vomiting/Reflux  [] Using Zofran PRN for nausea/vomiting   [x] Taking Prilosec for reflux    [] Issues with Constipation         [] Pt has not had a BM since surgery              [] Pt does feel constipated  [] Tried Colace- instructions given  [] Tried Miralax- instructions given    All questions and concerns addressed including patient has c/o gas and

## 2024-02-27 ENCOUNTER — OFFICE VISIT (OUTPATIENT)
Dept: BARIATRICS/WEIGHT MGMT | Age: 54
End: 2024-02-27

## 2024-02-27 VITALS
BODY MASS INDEX: 44.86 KG/M2 | SYSTOLIC BLOOD PRESSURE: 130 MMHG | DIASTOLIC BLOOD PRESSURE: 78 MMHG | HEART RATE: 90 BPM | WEIGHT: 296 LBS | RESPIRATION RATE: 18 BRPM | HEIGHT: 68 IN | OXYGEN SATURATION: 98 %

## 2024-02-27 DIAGNOSIS — Z98.84 S/P LAPAROSCOPIC SLEEVE GASTRECTOMY: Primary | ICD-10-CM

## 2024-02-27 PROCEDURE — 99024 POSTOP FOLLOW-UP VISIT: CPT | Performed by: SURGERY

## 2024-02-27 NOTE — PROGRESS NOTES
Dietary Assessment Note      Vitals:   Vitals:    24 1401   BP: 130/78   Pulse: 90   Resp: 18   SpO2: 98%   Weight: 134.3 kg (296 lb)   Height: 1.727 m (5' 8\")    Patient lost 40 lbs over 1 mos.    Total Weight Loss: 47 lbs    Labs reviewed:    Latest Reference Range & Units 23 10:08   LDL Calculated <100 mg/dL 113 (H)   (H): Data is abnormally high   Latest Reference Range & Units 23 10:08   Vit D, 25-Hydroxy >=30 ng/mL 27.0 (L)   (L): Data is abnormally low   Latest Reference Range & Units 23 10:08   TIBC 260 - 445 ug/dL 237 (L)   (L): Data is abnormally low    Protein intake: 60-80 grams/day 2-3 shakes per day    Fluid intake: 48 oz     Multivitamin/mineral intake:  Fusion with Fe    Calcium intake:  not yet    Other: none    Exercise:  walking 1.5 miles/day     Nutrition Assessment: 2 weeks post-op visit.   2 protein shakes + 1/4 cup pureed beef, chicken, mashed potato, green beans, oatmeal, scrambled egg    Amount able to eat per sittin/4 cup    Following 30/30/30 rule: yes, reports struggling to not drink at meals, but is cognizant to avoid it. Adjusting to new normal.    Food Intolerances/issues: none    Client Concerns: none    Goals: Start Phase 3 diet at 3 weeks post-op  - Continue to focus on fluids 48-64 oz    Plan: F/u per provider    YOUNG JIMENEZ, MS, RD, LD  
02/13/2024 05:08 AM    GFRAA >60 02/21/2022 10:24 AM    GFRAA >60 01/27/2012 11:40 AM    CALCIUM 9.0 02/13/2024 05:08 AM    PROT 7.2 12/26/2023 10:08 AM    PROT 6.9 01/27/2012 11:40 AM    LABALBU 4.4 12/26/2023 10:08 AM    AGRATIO 1.6 12/26/2023 10:08 AM    BILITOT 0.6 12/26/2023 10:08 AM    ALKPHOS 89 12/26/2023 10:08 AM    ALT 15 12/26/2023 10:08 AM    AST 13 12/26/2023 10:08 AM    GLOB 3.2 10/13/2020 06:22 AM     Lab Results   Component Value Date/Time    CHOL 166 12/26/2023 10:08 AM    TRIG 55 12/26/2023 10:08 AM    HDL 42 12/26/2023 10:08 AM    HDL 48 01/27/2012 11:40 AM    LDLCALC 113 12/26/2023 10:08 AM     Lab Results   Component Value Date/Time    TSHREFLEX 1.05 12/26/2023 10:08 AM     Lab Results   Component Value Date/Time    IRON 93 12/26/2023 10:08 AM    TIBC 237 12/26/2023 10:08 AM     Lab Results   Component Value Date/Time    KXUXJVIM21 427 12/26/2023 10:08 AM    FOLATE 8.13 12/26/2023 10:08 AM     Lab Results   Component Value Date/Time    VITD25 27.0 12/26/2023 10:08 AM     Lab Results   Component Value Date/Time    LABA1C 5.6 12/26/2023 10:08 AM    .0 12/26/2023 10:08 AM        The patient's current Body mass index is 45.01 kg/m². (2/27/24).  Since his last visit he has lost 40 lbs since last visit and total of 47 lbs. Charlene underwent dietary counseling, and I have reviewed and agree with the dietary counseling, and I have reviewed and agree with the diet plan. There are no changes in the patients medical history or physical exam.     Denies nausea, vomiting, fevers, chills, hiccups, shoulder pain, heartburn, dysphagia wound drainage/bulge nor change in color around incision. Bowels working ok and making urine.      The incisions healing well. Overall I'm really pleased with Charlene recovery. Pathology results were discussed with the patient.     Charlene advised to sign  release form for utilizing the 3 months complimentary membership in the [facility] starting after 6 weeks post

## 2024-03-28 ENCOUNTER — OFFICE VISIT (OUTPATIENT)
Dept: BARIATRICS/WEIGHT MGMT | Age: 54
End: 2024-03-28
Payer: COMMERCIAL

## 2024-03-28 VITALS
RESPIRATION RATE: 18 BRPM | BODY MASS INDEX: 40.66 KG/M2 | SYSTOLIC BLOOD PRESSURE: 126 MMHG | HEART RATE: 91 BPM | WEIGHT: 284 LBS | HEIGHT: 70 IN | OXYGEN SATURATION: 98 % | DIASTOLIC BLOOD PRESSURE: 80 MMHG

## 2024-03-28 DIAGNOSIS — Z98.84 S/P LAPAROSCOPIC SLEEVE GASTRECTOMY: Primary | ICD-10-CM

## 2024-03-28 PROCEDURE — 99214 OFFICE O/P EST MOD 30 MIN: CPT | Performed by: SURGERY

## 2024-03-28 PROCEDURE — 3074F SYST BP LT 130 MM HG: CPT | Performed by: SURGERY

## 2024-03-28 PROCEDURE — 3079F DIAST BP 80-89 MM HG: CPT | Performed by: SURGERY

## 2024-03-28 NOTE — PATIENT INSTRUCTIONS
.  Goals:   - Advance to phase 4 diet   - Take Fusion Calcium soft chews 2/day at separate times and try MVI with dinner or at bedtime  - Start strength exercise as tolerated

## 2024-03-28 NOTE — PROGRESS NOTES
[facility]   [facility]  Sabrina Villanueva MD, FACS, [facility]  [address], [address] [place] [address] .  Phone: [phone]  Fax: [phone]       The patient is a 53 y.o. male who returns today for follow up.   Charlene Cid is s/p     Laparoscopic sleeve gastrectomy    We discussed how his weight affects his overall health including:  Patient Active Problem List   Diagnosis    Morbid obesity with BMI of 45.0-49.9, adult (HCC)    Essential hypertension    Obstructive sleep apnea syndrome with CPAP    Former cigarette smoker    Family history of heart disease    Lower urinary tract symptoms (LUTS) - suspected BPH, first discussed 1/2022    Allergic rhinitis due to allergen    Chronic GERD    Ventral hernia without obstruction or gangrene    S/P laparoscopic sleeve gastrectomy        Vitals:    03/28/24 1102   BP: 126/80   Pulse: 91   Resp: 18   SpO2: 98%   Weight: 128.8 kg (284 lb)   Height: 1.778 m (5' 10\")       Lab Results   Component Value Date/Time    WBC 11.5 02/13/2024 05:08 AM    RBC 4.53 02/13/2024 05:08 AM    HGB 13.3 02/13/2024 05:08 AM    HCT 39.5 02/13/2024 05:08 AM    MCV 87.2 02/13/2024 05:08 AM    MCH 29.3 02/13/2024 05:08 AM    MCHC 33.6 02/13/2024 05:08 AM    MPV 9.3 02/13/2024 05:08 AM    NEUTOPHILPCT 55.1 12/26/2023 10:08 AM    LYMPHOPCT 29.6 12/26/2023 10:08 AM    MONOPCT 9.7 12/26/2023 10:08 AM    EOSRELPCT 4.7 12/26/2023 10:08 AM    BASOPCT 0.9 12/26/2023 10:08 AM    NEUTROABS 2.9 12/26/2023 10:08 AM    LYMPHSABS 1.5 12/26/2023 10:08 AM    MONOSABS 0.5 12/26/2023 10:08 AM    EOSABS 0.2 12/26/2023 10:08 AM     Lab Results   Component Value Date/Time     02/13/2024 05:08 AM    K 3.9 02/13/2024 05:08 AM    K 3.6 10/06/2020 04:11 AM     02/13/2024 05:08 AM    CO2 20 02/13/2024 05:08 AM    ANIONGAP 17 02/13/2024 05:08 AM    GLUCOSE 94 02/13/2024 05:08 AM    BUN 11 02/13/2024 05:08 AM    CREATININE 1.0 02/13/2024 05:08 AM    LABGLOM >60

## 2024-03-28 NOTE — PROGRESS NOTES
Dietary Assessment Note    Vitals:   Vitals:    24 1102   Pulse: 91   Resp: 18   SpO2: 98%   Weight: 128.8 kg (284 lb)   Height: 1.778 m (5' 10\")    Patient lost 12 lbs over 1 month.    Total Weight Loss: 59 lbs    Labs reviewed: labs are reviewed, up to date and normal, no lab studies available for review at time of visit    Protein intake: 60-80 grams/day continues to use 1 shake/day    Fluid intake: 48-64 oz/day    Multivitamin/mineral intake: Fusion capsule MVI w/ Iron - causes nausea/upset stomach when taking in the morning     Calcium intake: Reviewed    Other: none    Exercise:  treadmill 30 min + bike and treadmill 30 minutes each     Nutrition Assessment: 6 weeks post-op visit. Setting alarms to remember to eat while working  Breakfast: Nectar   Snack: None  Lunch: Chix salad w/ crax + applesauce  Snack: Triscuits   Dinner: 6:30 PM: Chix w/ taco seasoning OR mixed veggies + pork loin   Snack: SF pudding     Amount able to eat per sittin/2 cup     Following 30 rule: Eating over 30 minutes. May take a sip after eating but keeping glass off the table     Food Intolerances/issues: none    Client Concerns: inconsistent BM, denies pain or bloating, will take senna/miralax after 2-3 days     Handouts: SG24, CC flyer    Goals:   - Advance to phase 4 diet   - Take Fusion Calcium soft chews 2/day at separate times and try MVI with dinner or at bedtime  - Start strength exercise as tolerated     Plan: Follow up at 4 months post op and as needed    Shannan Zhang, SHAUNA, LD

## 2024-05-23 ENCOUNTER — OFFICE VISIT (OUTPATIENT)
Dept: BARIATRICS/WEIGHT MGMT | Age: 54
End: 2024-05-23
Payer: COMMERCIAL

## 2024-05-23 VITALS
OXYGEN SATURATION: 98 % | RESPIRATION RATE: 18 BRPM | HEART RATE: 72 BPM | WEIGHT: 268 LBS | HEIGHT: 70 IN | SYSTOLIC BLOOD PRESSURE: 130 MMHG | BODY MASS INDEX: 38.37 KG/M2 | DIASTOLIC BLOOD PRESSURE: 80 MMHG

## 2024-05-23 DIAGNOSIS — Z98.84 S/P LAPAROSCOPIC SLEEVE GASTRECTOMY: Primary | ICD-10-CM

## 2024-05-23 PROBLEM — K21.9 CHRONIC GERD: Status: RESOLVED | Noted: 2023-10-19 | Resolved: 2024-05-23

## 2024-05-23 PROCEDURE — 3075F SYST BP GE 130 - 139MM HG: CPT | Performed by: SURGERY

## 2024-05-23 PROCEDURE — 99214 OFFICE O/P EST MOD 30 MIN: CPT | Performed by: SURGERY

## 2024-05-23 PROCEDURE — 3079F DIAST BP 80-89 MM HG: CPT | Performed by: SURGERY

## 2024-05-23 NOTE — PROGRESS NOTES
Dietary Assessment Note      Vitals:   Vitals:    24 0952   BP: 130/80   Pulse: 72   Resp: 18   SpO2: 98%   Weight: 121.6 kg (268 lb)   Height: 1.778 m (5' 10\")    Patient lost 16 lbs over 2 months.    Total Weight Loss: 75 lbs    Labs reviewed: labs are reviewed, up to date and normal, no lab studies available for review at time of visit    Protein intake: 60-80 grams/day continues to use protein powder    Fluid intake: 48-64 oz/day 20 oz cup fills 3X/day    Multivitamin/mineral intake: OTC MVI 1 a day     Calcium intake: Fusion Calcium soft chews 2/day at separate times    Other: None    Exercise: Treadmill 15 min at 3.5 + bike and treadmill 15 minutes + rotates arm/leg machines     Nutrition Assessment: 4 months post-op visit.   Breakfast: Sausage + egg on lc wrap OR Protein shake   Snack: None  Lunch: Dietz Protein Bar   Snack: None OR Fruit (cutie/apple)  Dinner: Progresso light soup 1 can   Snack: None OR Popcorn     Amount able to eat per sittin/2-1 cup (uses same container/bowl for most meals     Following 30/30/30 rule: Eating over 30 minutes. May take a sip after eating but keeping glass off the table     Food Intolerances/issues: none    Client Concerns: MVI alternatives     Goals:   - Continue current eating patterns including protein and plants with all meals and snacks   - Track 2420-1107 kcal, 60-90 grams protein, 40-47 grams fat,  grams carb  - Choose MVI and Calcium per alterative list    Plan: Follow up at 6 months post op and as needed    Shannan Zhang, SHAUNA, LD

## 2024-05-23 NOTE — PROGRESS NOTES
[facility]   [facility]  Sabrina Villanueva MD, FACS, [address], [address] [place] [address] .  Phone: [phone]  Fax: [phone]            Chief Complaint   Patient presents with    Bariatrics Post Op Follow Up     14 wk s/p sleeve 2/12/24           HPI:    Charlene Cid is a very pleasant 53 y.o.  male , Body mass index is 38.45 kg/m².. And multiple medical problems who is presenting for bariatric follow up care.   Charlene is s/p laparoscopic sleeve gastrectomy by me 2/2024. Initial Weight: 343 lbs, Weight Loss: 75 lbs.   Comes today to the clinic without any complaints. Patient denies any nausea, vomiting, fevers, chills, shortness of breath, chest pain, constipation or urinary symptoms. Denies any heartburn nor dysphagia.  Patient informed us today that they are taking the multivitamins as instructed.  Patient denies any tingling, weakness,  numbness nor any neurological symptoms.  Cahrlene is feeling very well, and is very active. Patient is very pleased with the weight loss and resolution of co-morbid conditions.      Pain Assessment   Denies any abdominal pain     Past Medical History:   Diagnosis Date    Chronic GERD 10/19/2023    Essential hypertension     Severe obstructive sleep apnea     cpap     Past Surgical History:   Procedure Laterality Date    APPENDECTOMY      COLONOSCOPY N/A 01/20/2020    COLONOSCOPY W/ ANESTHESIA performed by Reinaldo Lozada MD at [facility] ENDOSCOPY    KNEE ARTHROSCOPY Bilateral     SEPTOPLASTY Bilateral 11/18/2022    BILATERAL INFERIOR TURBINATE REDUCTION AND SEPTOPLASTY performed by Jonah Pires MD at [facility] OR    SLEEVE GASTRECTOMY N/A 2/12/2024    LAPAROSCOPIC SLEEVE GASTRECTOMY AND VENTRAL HERNIA REPAIR performed by Sabrina Villanueva MD at [facility] OR    UPPER GASTROINTESTINAL ENDOSCOPY N/A 12/1/2023    EGD BIOPSY performed by Sabrina Villanueva MD at [facility] ENDOSCOPY     Family History   Problem Relation Age of

## 2024-05-23 NOTE — PATIENT INSTRUCTIONS
Patient received dietary handouts and education.    Goals:   - Continue current eating patterns including protein and plants with all meals and snacks   - Track 6418-6137 kcal, 60-90 grams protein, 40-47 grams fat,  grams carb  - Choose MVI and Calcium per alterative list

## 2024-06-08 DIAGNOSIS — K21.9 CHRONIC GERD: Primary | ICD-10-CM

## 2024-06-17 RX ORDER — OMEPRAZOLE 20 MG/1
20 CAPSULE, DELAYED RELEASE ORAL
Qty: 90 CAPSULE | Refills: 1 | Status: SHIPPED | OUTPATIENT
Start: 2024-06-17

## 2024-07-18 ENCOUNTER — OFFICE VISIT (OUTPATIENT)
Dept: BARIATRICS/WEIGHT MGMT | Age: 54
End: 2024-07-18
Payer: COMMERCIAL

## 2024-07-18 VITALS
WEIGHT: 258 LBS | HEIGHT: 70 IN | BODY MASS INDEX: 36.94 KG/M2 | RESPIRATION RATE: 18 BRPM | HEART RATE: 65 BPM | SYSTOLIC BLOOD PRESSURE: 128 MMHG | DIASTOLIC BLOOD PRESSURE: 82 MMHG | OXYGEN SATURATION: 98 %

## 2024-07-18 DIAGNOSIS — Z98.84 S/P LAPAROSCOPIC SLEEVE GASTRECTOMY: Primary | ICD-10-CM

## 2024-07-18 DIAGNOSIS — E66.9 OBESITY (BMI 30-39.9): ICD-10-CM

## 2024-07-18 PROCEDURE — 99214 OFFICE O/P EST MOD 30 MIN: CPT | Performed by: SURGERY

## 2024-07-18 NOTE — PATIENT INSTRUCTIONS
Patient received dietary handouts and education.    Goals:   - Continue current eating patterns including protein and plants with all meals and snacks  - Get back into structured exercise routine 2-3 days/week

## 2024-07-18 NOTE — PROGRESS NOTES
Dietary Assessment Note      Vitals:   Vitals:    24 0855   BP: 128/82   Pulse: 65   Resp: 18   SpO2: 98%   Weight: 117 kg (258 lb)   Height: 1.778 m (5' 10\")    Patient lost 10 lbs over 2 months.    Total Weight Loss: 85 lbs    Labs reviewed: no lab studies available for review at time of visit    Protein intake: 60-80 grams/day uses PP occasionally     Fluid intake: 48-64 oz/day 20 oz cup fills 3X/day    Multivitamin/mineral intake: OTC MVI 2/day     Calcium intake: Fusion Calcium soft chews 2/day at separate times     Other: None    Exercise: out of gym routine with travel - more yard work/ house project and walking in evenings    Nutrition Assessment: 5 months post-op visit.   Breakfast: Protein Shake OR greek yogurt w/ berries and granola   Snack: None  Lunch: 1/2 Pork chop + broccoli   Snack: Wheat thins   Dinner: Chix + Marycruz greens  Snack: None OR Popcorn     Amount able to eat per sittin/2-1 cup (uses same container/bowl for most meals)    Following 30/30/30 rule: Eating over 30 minutes. May take a sip after eating but keeping glass off the table     Food Intolerances/issues: none    Client Concerns: None    Goals:   - Continue current eating patterns including protein and plants with all meals and snacks  - Get back into structured exercise routine 2-3 days/week    Plan: Follow up at 8 months post op and as needed    Shannan Zhang RD, KVNG

## 2024-07-18 NOTE — PROGRESS NOTES
[facility]   Weight [facility]  Sabrina Villanueva MD, FACS, [address], [address] [place] [address] .  Phone: [phone]  Fax: [phone]            Chief Complaint   Patient presents with    Bariatrics Post Op Follow Up     6mo s/p sleeve 2/12/24           HPI:    Charlene Cid is a very pleasant 53 y.o.  male , Body mass index is 37.02 kg/m².. And multiple medical problems who is presenting for bariatric follow up care.   Charlene is s/p laparoscopic sleeve gastrectomy by me 2/2024. Initial Weight: 343 lbs, Weight Loss: 85 lbs.   Comes today to the clinic without any complaints. Patient denies any nausea, vomiting, fevers, chills, shortness of breath, chest pain, constipation or urinary symptoms. Denies any heartburn nor dysphagia.  Patient informed us today that they are taking the multivitamins as instructed.  Patient denies any tingling, weakness,  numbness nor any neurological symptoms.  Charlene is feeling very well, and is very active. Patient is very pleased with the weight loss and resolution of co-morbid conditions.      Pain Assessment   Denies any abdominal pain     Past Medical History:   Diagnosis Date    Chronic GERD 10/19/2023    Essential hypertension     Severe obstructive sleep apnea     cpap     Past Surgical History:   Procedure Laterality Date    APPENDECTOMY      COLONOSCOPY N/A 01/20/2020    COLONOSCOPY W/ ANESTHESIA performed by Reinaldo Lozada MD at [facility] ENDOSCOPY    KNEE ARTHROSCOPY Bilateral     SEPTOPLASTY Bilateral 11/18/2022    BILATERAL INFERIOR TURBINATE REDUCTION AND SEPTOPLASTY performed by Jonah Pires MD at [facility] OR    SLEEVE GASTRECTOMY N/A 2/12/2024    LAPAROSCOPIC SLEEVE GASTRECTOMY AND VENTRAL HERNIA REPAIR performed by Sabrina Villanueva MD at [facility] OR    UPPER GASTROINTESTINAL ENDOSCOPY N/A 12/1/2023    EGD BIOPSY performed by Sabrina Villanueva MD at [facility] ENDOSCOPY     Family History   Problem Relation Age of

## 2024-08-13 ENCOUNTER — OFFICE VISIT (OUTPATIENT)
Dept: PRIMARY CARE CLINIC | Age: 54
End: 2024-08-13
Payer: COMMERCIAL

## 2024-08-13 VITALS
OXYGEN SATURATION: 96 % | WEIGHT: 256.2 LBS | HEART RATE: 73 BPM | BODY MASS INDEX: 36.76 KG/M2 | TEMPERATURE: 98 F | SYSTOLIC BLOOD PRESSURE: 116 MMHG | DIASTOLIC BLOOD PRESSURE: 74 MMHG

## 2024-08-13 DIAGNOSIS — Z00.00 ANNUAL PHYSICAL EXAM: Primary | ICD-10-CM

## 2024-08-13 DIAGNOSIS — E66.09 CLASS 2 OBESITY DUE TO EXCESS CALORIES WITHOUT SERIOUS COMORBIDITY WITH BODY MASS INDEX (BMI) OF 36.0 TO 36.9 IN ADULT: ICD-10-CM

## 2024-08-13 DIAGNOSIS — Z98.84 S/P LAPAROSCOPIC SLEEVE GASTRECTOMY: ICD-10-CM

## 2024-08-13 DIAGNOSIS — Z12.5 SCREENING FOR PROSTATE CANCER: ICD-10-CM

## 2024-08-13 DIAGNOSIS — Z86.69 HISTORY OF SLEEP APNEA: ICD-10-CM

## 2024-08-13 DIAGNOSIS — I83.893 VARICOSE VEINS OF BOTH LOWER EXTREMITIES WITH COMPLICATIONS: ICD-10-CM

## 2024-08-13 PROCEDURE — 99396 PREV VISIT EST AGE 40-64: CPT | Performed by: FAMILY MEDICINE

## 2024-08-13 RX ORDER — OLOPATADINE HYDROCHLORIDE AND MOMETASONE FUROATE 25; 665 UG/1; UG/1
SPRAY, METERED NASAL
COMMUNITY
Start: 2024-07-25

## 2024-08-13 RX ORDER — ELECTROLYTES/DEXTROSE
SOLUTION, ORAL ORAL
COMMUNITY

## 2024-08-13 SDOH — ECONOMIC STABILITY: FOOD INSECURITY: WITHIN THE PAST 12 MONTHS, THE FOOD YOU BOUGHT JUST DIDN'T LAST AND YOU DIDN'T HAVE MONEY TO GET MORE.: NEVER TRUE

## 2024-08-13 SDOH — ECONOMIC STABILITY: FOOD INSECURITY: WITHIN THE PAST 12 MONTHS, YOU WORRIED THAT YOUR FOOD WOULD RUN OUT BEFORE YOU GOT MONEY TO BUY MORE.: NEVER TRUE

## 2024-08-13 SDOH — ECONOMIC STABILITY: INCOME INSECURITY: HOW HARD IS IT FOR YOU TO PAY FOR THE VERY BASICS LIKE FOOD, HOUSING, MEDICAL CARE, AND HEATING?: NOT HARD AT ALL

## 2024-08-13 NOTE — PROGRESS NOTES
[facility]  [facility]  [address] [place] [id number]  Dept: [phone]  Dept Fax: [phone]     2024      Charlene HAND Jose Roberto   1970     Chief Complaint   Patient presents with    Annual Exam     fasting       HPI  Pt comes in today for physical. Successful gastric sleeve 6 months ago. Has been noticing itching inside of L knee/leg - right where the varicose veins are. Not treating this with anything. Feels that his varicose veins have gotten more extensive both legs. No other acute concerns. Has upcoming f/u with labs with his surgeon in Sept.     Wt Readings from Last 5 Encounters:   24 116.2 kg (256 lb 3.2 oz)   24 117 kg (258 lb)   24 121.6 kg (268 lb)   24 128.8 kg (284 lb)   24 134.3 kg (296 lb)     BP Readings from Last 5 Encounters:   24 116/74   24 128/82   24 130/80   24 126/80   24 130/78         2024    10:44 AM 2023    11:34 PM 2022    11:52 AM 1/10/2020     7:47 AM 2019     9:13 AM 2017    12:15 PM   PHQ Scores   PHQ2 Score 0 0 0 0 0 0   PHQ9 Score 0 0 0 0 0 0     Interpretation of Total Score Depression Severity: 1-4 = Minimal depression, 5-9 = Mild depression, 10-14 = Moderate depression, 15-19 = Moderately severe depression, 20-27 = Severe depression     Prior to Visit Medications    Medication Sig Taking? Authorizing Provider   RYALTRIS 665-25 MCG/ACT SUSP INSTILL 2 SPRAYS IN EACH NOSTRIL TWICE DAILY Yes Tan Carter MD   Multiple Vitamin (MULTIVITAMIN ADULT) TABS Take by mouth Yes Tan Carter MD       Past Medical History:   Diagnosis Date    Chronic GERD 10/19/2023    Essential hypertension         Social History     Tobacco Use    Smoking status: Former     Types: Cigars     Quit date: 2021     Years since quittin.9    Smokeless tobacco: Never    Tobacco comments:     light cigar smoker/ 1 every month or so,   Vaping Use

## 2024-08-13 NOTE — PATIENT INSTRUCTIONS
[facility] Laboratory Locations - No appointment necessary.  ? indicates the location is open Saturdays in addition to Monday through Friday.   Call your preferred location for test preparation, business hours and other information you need.   [facility] accepts all insurances.  [place]  [place]  [place]    ? [place]   [address]   [address]   [place] [address]    Ph: [phone]  [facility]   [address] [place] [place] [address]    Ph: [phone]   ? [place]   [address]    [place] [address]    Ph: [phone]     [facility]   [address] [place] [address]    Ph: [phone] ? [address] [place] [address]   Ph: [phone]  ? [facility]   [address] [place] [address]    Ph: [phone]      [place]   [address] [place] [address]   Ph: [phone]    NORTH    ? [facility]   [address] [place] [address]    Ph: [phone]  [facility]   [address] [place] [address]   Ph: [phone]  [place]   [address], [address]    PH: [phone]    [facility]   [address] [place] [address]    Ph: [phone]  [place]  [address] [place] [address]  Ph: [phone]  [facility]   [address] [place] [address]    Ph: [phone]

## 2024-08-16 ASSESSMENT — ENCOUNTER SYMPTOMS
EYE PAIN: 0
ROS SKIN COMMENTS: +ITCHING
DIARRHEA: 0
CONSTIPATION: 0
ABDOMINAL PAIN: 0
NAUSEA: 0
SHORTNESS OF BREATH: 0
EYE ITCHING: 0
COUGH: 0
WHEEZING: 0
SORE THROAT: 0
VOMITING: 0

## 2024-09-16 ENCOUNTER — CLINICAL DOCUMENTATION (OUTPATIENT)
Dept: BARIATRICS/WEIGHT MGMT | Age: 54
End: 2024-09-16

## 2024-09-17 ENCOUNTER — HOSPITAL ENCOUNTER (OUTPATIENT)
Age: 54
Discharge: HOME | End: 2024-09-17
Payer: COMMERCIAL

## 2024-09-17 DIAGNOSIS — Z12.5 SCREENING FOR PROSTATE CANCER: ICD-10-CM

## 2024-09-17 DIAGNOSIS — Z98.84 S/P LAPAROSCOPIC SLEEVE GASTRECTOMY: ICD-10-CM

## 2024-09-17 DIAGNOSIS — E66.9 OBESITY (BMI 30-39.9): ICD-10-CM

## 2024-09-17 LAB
25(OH)D3 SERPL-MCNC: 22.1 NG/ML
ALBUMIN SERPL-MCNC: 4 G/DL (ref 3.4–5)
ALBUMIN/GLOB SERPL: 1.5 {RATIO} (ref 1.1–2.2)
ALP SERPL-CCNC: 77 U/L (ref 40–129)
ALT SERPL-CCNC: <5 U/L (ref 10–40)
ANION GAP SERPL CALCULATED.3IONS-SCNC: 8 MMOL/L (ref 3–16)
AST SERPL-CCNC: 13 U/L (ref 15–37)
BASOPHILS # BLD: 0 K/UL (ref 0–0.2)
BASOPHILS NFR BLD: 0.6 %
BILIRUB SERPL-MCNC: 0.7 MG/DL (ref 0–1)
BUN SERPL-MCNC: 12 MG/DL (ref 7–20)
CALCIUM SERPL-MCNC: 9.2 MG/DL (ref 8.3–10.6)
CHLORIDE SERPL-SCNC: 108 MMOL/L (ref 99–110)
CHOLEST SERPL-MCNC: 182 MG/DL (ref 0–199)
CO2 SERPL-SCNC: 27 MMOL/L (ref 21–32)
CREAT SERPL-MCNC: 0.8 MG/DL (ref 0.9–1.3)
DEPRECATED RDW RBC AUTO: 15 % (ref 12.4–15.4)
EOSINOPHIL # BLD: 0.2 K/UL (ref 0–0.6)
EOSINOPHIL NFR BLD: 5.3 %
EST. AVERAGE GLUCOSE BLD GHB EST-MCNC: 99.7 MG/DL
FOLATE SERPL-MCNC: 4.3 NG/ML (ref 4.78–24.2)
GFR SERPLBLD CREATININE-BSD FMLA CKD-EPI: >90 ML/MIN/{1.73_M2}
GLUCOSE SERPL-MCNC: 91 MG/DL (ref 70–99)
HBA1C MFR BLD: 5.1 %
HCT VFR BLD AUTO: 39.8 % (ref 40.5–52.5)
HDLC SERPL-MCNC: 49 MG/DL (ref 40–60)
HGB BLD-MCNC: 13.2 G/DL (ref 13.5–17.5)
INR PPP: 0.99 (ref 0.85–1.15)
IRON SATN MFR SERPL: 37 % (ref 20–50)
IRON SERPL-MCNC: 86 UG/DL (ref 59–158)
LDLC SERPL CALC-MCNC: 123 MG/DL
LYMPHOCYTES # BLD: 1.1 K/UL (ref 1–5.1)
LYMPHOCYTES NFR BLD: 32.5 %
MCH RBC QN AUTO: 29.5 PG (ref 26–34)
MCHC RBC AUTO-ENTMCNC: 33.2 G/DL (ref 31–36)
MCV RBC AUTO: 88.7 FL (ref 80–100)
MONOCYTES # BLD: 0.3 K/UL (ref 0–1.3)
MONOCYTES NFR BLD: 9.2 %
NEUTROPHILS # BLD: 1.8 K/UL (ref 1.7–7.7)
NEUTROPHILS NFR BLD: 52.4 %
PLATELET # BLD AUTO: 235 K/UL (ref 135–450)
PMV BLD AUTO: 9.3 FL (ref 5–10.5)
POTASSIUM SERPL-SCNC: 3.9 MMOL/L (ref 3.5–5.1)
PROT SERPL-MCNC: 6.6 G/DL (ref 6.4–8.2)
PROTHROMBIN TIME: 13.3 SEC (ref 11.9–14.9)
PSA SERPL DL<=0.01 NG/ML-MCNC: 0.39 NG/ML (ref 0–4)
RBC # BLD AUTO: 4.49 M/UL (ref 4.2–5.9)
SODIUM SERPL-SCNC: 143 MMOL/L (ref 136–145)
TIBC SERPL-MCNC: 230 UG/DL (ref 260–445)
TRIGL SERPL-MCNC: 49 MG/DL (ref 0–150)
TSH SERPL DL<=0.005 MIU/L-ACNC: 0.9 UIU/ML (ref 0.27–4.2)
VIT B12 SERPL-MCNC: 469 PG/ML (ref 211–911)
VLDLC SERPL CALC-MCNC: 10 MG/DL
WBC # BLD AUTO: 3.5 K/UL (ref 4–11)

## 2024-09-17 PROCEDURE — 82746 ASSAY OF FOLIC ACID SERUM: CPT

## 2024-09-17 PROCEDURE — 83036 HEMOGLOBIN GLYCOSYLATED A1C: CPT

## 2024-09-17 PROCEDURE — 85025 COMPLETE CBC W/AUTO DIFF WBC: CPT

## 2024-09-17 PROCEDURE — 82607 VITAMIN B-12: CPT

## 2024-09-17 PROCEDURE — 84153 ASSAY OF PSA TOTAL: CPT

## 2024-09-17 PROCEDURE — 84443 ASSAY THYROID STIM HORMONE: CPT

## 2024-09-17 PROCEDURE — 84446 ASSAY OF VITAMIN E: CPT

## 2024-09-17 PROCEDURE — 80061 LIPID PANEL: CPT

## 2024-09-17 PROCEDURE — 83540 ASSAY OF IRON: CPT

## 2024-09-17 PROCEDURE — 82306 VITAMIN D 25 HYDROXY: CPT

## 2024-09-17 PROCEDURE — 84425 ASSAY OF VITAMIN B-1: CPT

## 2024-09-17 PROCEDURE — 36415 COLL VENOUS BLD VENIPUNCTURE: CPT

## 2024-09-17 PROCEDURE — 83550 IRON BINDING TEST: CPT

## 2024-09-17 PROCEDURE — 84590 ASSAY OF VITAMIN A: CPT

## 2024-09-17 PROCEDURE — 85610 PROTHROMBIN TIME: CPT

## 2024-09-17 PROCEDURE — 80053 COMPREHEN METABOLIC PANEL: CPT

## 2024-09-19 ENCOUNTER — OFFICE VISIT (OUTPATIENT)
Dept: BARIATRICS/WEIGHT MGMT | Age: 54
End: 2024-09-19
Payer: COMMERCIAL

## 2024-09-19 VITALS
BODY MASS INDEX: 35.19 KG/M2 | HEIGHT: 70 IN | HEART RATE: 75 BPM | DIASTOLIC BLOOD PRESSURE: 72 MMHG | WEIGHT: 245.8 LBS | OXYGEN SATURATION: 99 % | RESPIRATION RATE: 18 BRPM | SYSTOLIC BLOOD PRESSURE: 118 MMHG

## 2024-09-19 DIAGNOSIS — Z98.84 S/P LAPAROSCOPIC SLEEVE GASTRECTOMY: Primary | ICD-10-CM

## 2024-09-19 DIAGNOSIS — E66.9 OBESITY (BMI 30-39.9): ICD-10-CM

## 2024-09-19 LAB
A-TOCOPHEROL VIT E SERPL-MCNC: 9 MG/L (ref 5.5–18)
ANNOTATION COMMENT IMP: NORMAL
BETA+GAMMA TOCOPHEROL SERPL-MCNC: 1.3 MG/L (ref 0–6)
RETINYL PALMITATE SERPL-MCNC: <0.02 MG/L (ref 0–0.1)
VIT A SERPL-MCNC: 0.41 MG/L (ref 0.3–1.2)

## 2024-09-19 PROCEDURE — 99214 OFFICE O/P EST MOD 30 MIN: CPT | Performed by: SURGERY

## 2024-09-19 RX ORDER — LANOLIN ALCOHOL/MO/W.PET/CERES
400 CREAM (GRAM) TOPICAL DAILY
Qty: 30 TABLET | Refills: 3 | Status: SHIPPED | OUTPATIENT
Start: 2024-09-19

## 2024-09-21 LAB — VIT B1 BLD-MCNC: 96 NMOL/L (ref 70–180)

## 2024-10-24 ENCOUNTER — OFFICE VISIT (OUTPATIENT)
Dept: VASCULAR SURGERY | Age: 54
End: 2024-10-24
Payer: COMMERCIAL

## 2024-10-24 VITALS
BODY MASS INDEX: 35.79 KG/M2 | HEART RATE: 68 BPM | HEIGHT: 70 IN | SYSTOLIC BLOOD PRESSURE: 138 MMHG | WEIGHT: 250 LBS | OXYGEN SATURATION: 98 % | DIASTOLIC BLOOD PRESSURE: 86 MMHG

## 2024-10-24 DIAGNOSIS — I83.893 SYMPTOMATIC VARICOSE VEINS OF BOTH LOWER EXTREMITIES: Primary | ICD-10-CM

## 2024-10-24 PROCEDURE — 99204 OFFICE O/P NEW MOD 45 MIN: CPT | Performed by: SURGERY

## 2024-10-24 NOTE — PROGRESS NOTES
ASSESSMENT:    Symptomatic varicose veins B legs    RECOMMENDATION/PLAN:    Begin thigh-high 20/30 mmHg compression stockings on a daily basis to gauge symptom response and prevent progression to complications.  Return to see this physician after undergoing bilateral lower extremity venous reflux study to discuss results and treatment options.  Pathophysiology of varicose vein development and venous insufficiency were explained in detail to the patient he seems understand this well.  All questions were answered and he and he agrees to proceed in this manner.  Time spent the patient on history, exam, medical decision making and documentation 45 minutes.

## 2024-10-25 DIAGNOSIS — I83.893 SYMPTOMATIC VARICOSE VEINS OF BOTH LOWER EXTREMITIES: Primary | ICD-10-CM

## 2024-10-29 ENCOUNTER — TELEPHONE (OUTPATIENT)
Dept: BARIATRICS/WEIGHT MGMT | Age: 54
End: 2024-10-29

## 2024-10-29 NOTE — TELEPHONE ENCOUNTER
Patient is s/p sleeve 2/12/24 Kay. He calls the office stating at his last appointment he requested \"\"all the diet phases\" He was only given phase 4. Patient asking if 1 -3 could be emailed. Please contact patient to discuss.     # [phone]   Email: [email or web address]

## 2024-10-30 NOTE — TELEPHONE ENCOUNTER
9/16/24 - I emailed the following to the patient: 1200 kcal POMP, cooking resources, protein shakes, frozen meals, MVI alternative, Phase 4 diet  He had lost these handouts in a move.     We typically do not provide the Preop/postop phases 1-3 to patients because there is not a need for them to go back to these diets as they are designed for liver shrinking/ texture safety. I will reach out to patient after clinic to discuss.

## 2024-10-30 NOTE — TELEPHONE ENCOUNTER
Pt called in again regarding phases 1, 2, 3 handouts. Explained to pt that these handouts are not typically given again once pt is past these phases since they are for pt's safety after surgery. Pt would still like copies; this writer explained needing to check with Dr. Villanueva. This writer will follow up with patient tomorrow after discussing with provider.

## 2024-11-14 ENCOUNTER — OFFICE VISIT (OUTPATIENT)
Dept: BARIATRICS/WEIGHT MGMT | Age: 54
End: 2024-11-14
Payer: COMMERCIAL

## 2024-11-14 VITALS
RESPIRATION RATE: 18 BRPM | SYSTOLIC BLOOD PRESSURE: 136 MMHG | WEIGHT: 248 LBS | DIASTOLIC BLOOD PRESSURE: 78 MMHG | OXYGEN SATURATION: 98 % | BODY MASS INDEX: 35.5 KG/M2 | HEART RATE: 80 BPM | HEIGHT: 70 IN

## 2024-11-14 DIAGNOSIS — Z86.69 HISTORY OF SLEEP APNEA: ICD-10-CM

## 2024-11-14 DIAGNOSIS — E66.9 OBESITY (BMI 30-39.9): Primary | ICD-10-CM

## 2024-11-14 DIAGNOSIS — I83.893 VARICOSE VEINS OF BOTH LOWER EXTREMITIES WITH COMPLICATIONS: ICD-10-CM

## 2024-11-14 DIAGNOSIS — Z98.84 S/P LAPAROSCOPIC SLEEVE GASTRECTOMY: ICD-10-CM

## 2024-11-14 PROCEDURE — G2211 COMPLEX E/M VISIT ADD ON: HCPCS | Performed by: SURGERY

## 2024-11-14 PROCEDURE — 99214 OFFICE O/P EST MOD 30 MIN: CPT | Performed by: SURGERY

## 2024-11-14 NOTE — PROGRESS NOTES
[facility]   Weight Management Solutions  Sabrina Villanueva MD, FACS, [address], [address] [place] [address] .  Phone: [phone]  Fax: [phone]            Chief Complaint   Patient presents with    Bariatrics Post Op Follow Up     10mo s/p sleeve 2/12/24           HPI:    Charlene Cid is a very pleasant 54 y.o.  male , Body mass index is 35.58 kg/m².. And multiple medical problems who is presenting for bariatric follow up care.   Charlene is s/p laparoscopic sleeve gastrectomy by me 2/2024. Initial Weight: 343 lbs, Weight Loss: 95 lbs.   Comes today to the clinic without any complaints. Patient denies any nausea, vomiting, fevers, chills, shortness of breath, chest pain, constipation or urinary symptoms. Denies any heartburn nor dysphagia.  Patient informed us today that they are taking the multivitamins as instructed.  Patient denies any tingling, weakness,  numbness nor any neurological symptoms.  Charlene is feeling very well, and is very active. Patient is very pleased with the weight loss and resolution of co-morbid conditions.      Pain Assessment   Denies any abdominal pain     Past Medical History:   Diagnosis Date    Chronic GERD 10/19/2023    Essential hypertension      Past Surgical History:   Procedure Laterality Date    APPENDECTOMY      COLONOSCOPY N/A 01/20/2020    COLONOSCOPY W/ ANESTHESIA performed by Reinaldo Lozada MD at [facility] ENDOSCOPY    KNEE ARTHROSCOPY Bilateral     SEPTOPLASTY Bilateral 11/18/2022    BILATERAL INFERIOR TURBINATE REDUCTION AND SEPTOPLASTY performed by Jonah Pires MD at [facility] OR    SLEEVE GASTRECTOMY N/A 2/12/2024    LAPAROSCOPIC SLEEVE GASTRECTOMY AND VENTRAL HERNIA REPAIR performed by Sabrina Villanueva MD at [facility] OR    UPPER GASTROINTESTINAL ENDOSCOPY N/A 12/1/2023    EGD BIOPSY performed by Sabrina Villnaueva MD at [facility] ENDOSCOPY     Family History   Problem Relation Age of Onset    Heart Disease Father     Heart Attack

## 2025-01-20 NOTE — TELEPHONE ENCOUNTER
Edouard Minor is requesting refill(s)   Last OV 11/06/2020 (pertaining to medication)  LR 11/06/2020 (per medication requested)  Next office visit scheduled or attempted L/M for patient to return call to find out if she has a new PCP [id number]

## 2025-02-06 ENCOUNTER — OFFICE VISIT (OUTPATIENT)
Dept: VASCULAR SURGERY | Age: 55
End: 2025-02-06
Payer: COMMERCIAL

## 2025-02-06 VITALS
WEIGHT: 248 LBS | HEART RATE: 68 BPM | HEIGHT: 70 IN | OXYGEN SATURATION: 98 % | DIASTOLIC BLOOD PRESSURE: 88 MMHG | SYSTOLIC BLOOD PRESSURE: 130 MMHG | BODY MASS INDEX: 35.5 KG/M2

## 2025-02-06 DIAGNOSIS — I83.893 SYMPTOMATIC VARICOSE VEINS OF BOTH LOWER EXTREMITIES: Primary | ICD-10-CM

## 2025-02-06 PROCEDURE — 99213 OFFICE O/P EST LOW 20 MIN: CPT | Performed by: SURGERY

## 2025-02-06 NOTE — PROGRESS NOTES
Seen back for symptomatic varicose veins B legs.  Pt has regularly been wearing the prescribed TH stockings with some benefit as decreased symptoms.  No reported edema, bleeding, tender cord, skin changes or ulceration.  Recent venous reflux scan performed on 2/6/2025 at [facility].    EXAM:  No edema, ulceration or dermatitis.    All VVs soft, nontender without erythema.     VRS - R BK GSV reflux, L GSV reflux; B LSV reflux    A/P: Chronic superficial venous insufficiency B legs with secondary symptomatic varicose veins   SIGNIFICANT B AXIAL REFLUX with LARGE VARICOSITIES.   Surgery is recommended - R BK GSV RFA + L GSV RFA + B LSV RFA + B stab phlebectomies.   This was discussed in terms that the patient could understand.   The risks (bleeding, clotting, bruising, swelling, neuritis, skin dimpling, infection, pigmentation, scarring) and mortality were discussed.   I answered all questions pertaining to surgery and post operative expectations related to return to work and daily activity.   Time spent counseling and coordination of care: 25 minutes.  More than 50% of visit was spent reviewing and discussing venous duplex scan.  He will continue compression stockings and schedule as recommended.

## 2025-02-07 ENCOUNTER — TELEPHONE (OUTPATIENT)
Dept: VASCULAR SURGERY | Age: 55
End: 2025-02-07

## 2025-02-07 NOTE — TELEPHONE ENCOUNTER
Please submit PA for R BK GSV RFA + L GSV RFA + B LSV RFA + B stab phlebectomies. Form scanned into media.

## 2025-02-14 NOTE — TELEPHONE ENCOUNTER
Pt has been APPROVED for the procedure R BK GSV RFA + L GSV RFA + B LSV RFA + B stab phlebectomies. referred to by Dr Campos per insurance. Pt can be scheduled per convenience.    CPT CODES APPROVED- 27508 (1 UNIT)  CPT CODES NPR- 265598 (3 UNITS) 48111 (2 UNITS)    PROCEDURE- R BK GSV RFA + L GSV RFA + B LSV RFA + B stab phlebectomies.   [facility]  STAB FEES- $1500  DIAGNOSIS CODES- I83.893  HOURS- 2 AND HALF  POST OP SCAN AND OV NEEDED. YES

## 2025-03-04 DIAGNOSIS — I83.893 SYMPTOMATIC VARICOSE VEINS OF BOTH LOWER EXTREMITIES: Primary | ICD-10-CM

## 2025-03-06 ENCOUNTER — PREP FOR PROCEDURE (OUTPATIENT)
Dept: VASCULAR SURGERY | Age: 55
End: 2025-03-06

## 2025-03-06 DIAGNOSIS — Z01.818 PREOP TESTING: Primary | ICD-10-CM

## 2025-03-06 DIAGNOSIS — I83.893 SYMPTOMATIC VARICOSE VEINS OF BOTH LOWER EXTREMITIES: ICD-10-CM

## 2025-03-06 RX ORDER — SODIUM CHLORIDE 0.9 % (FLUSH) 0.9 %
5-40 SYRINGE (ML) INJECTION EVERY 12 HOURS SCHEDULED
Status: CANCELLED | OUTPATIENT
Start: 2025-03-06

## 2025-03-06 RX ORDER — SODIUM CHLORIDE 0.9 % (FLUSH) 0.9 %
5-40 SYRINGE (ML) INJECTION PRN
Status: CANCELLED | OUTPATIENT
Start: 2025-03-06

## 2025-03-06 RX ORDER — SODIUM CHLORIDE 9 MG/ML
INJECTION, SOLUTION INTRAVENOUS PRN
Status: CANCELLED | OUTPATIENT
Start: 2025-03-06

## 2025-03-24 ENCOUNTER — OFFICE VISIT (OUTPATIENT)
Dept: PRIMARY CARE CLINIC | Age: 55
End: 2025-03-24
Payer: COMMERCIAL

## 2025-03-24 VITALS
OXYGEN SATURATION: 99 % | WEIGHT: 244.6 LBS | BODY MASS INDEX: 36.23 KG/M2 | SYSTOLIC BLOOD PRESSURE: 135 MMHG | HEIGHT: 69 IN | DIASTOLIC BLOOD PRESSURE: 87 MMHG | TEMPERATURE: 98.2 F | HEART RATE: 68 BPM

## 2025-03-24 DIAGNOSIS — I83.893 SYMPTOMATIC VARICOSE VEINS OF BOTH LOWER EXTREMITIES: Primary | ICD-10-CM

## 2025-03-24 DIAGNOSIS — E66.812 CLASS 2 OBESITY DUE TO EXCESS CALORIES WITHOUT SERIOUS COMORBIDITY WITH BODY MASS INDEX (BMI) OF 36.0 TO 36.9 IN ADULT: ICD-10-CM

## 2025-03-24 DIAGNOSIS — E66.09 CLASS 2 OBESITY DUE TO EXCESS CALORIES WITHOUT SERIOUS COMORBIDITY WITH BODY MASS INDEX (BMI) OF 36.0 TO 36.9 IN ADULT: ICD-10-CM

## 2025-03-24 DIAGNOSIS — Z01.818 PREOPERATIVE EXAMINATION: ICD-10-CM

## 2025-03-24 DIAGNOSIS — N50.89 SCROTAL MASS: ICD-10-CM

## 2025-03-24 DIAGNOSIS — J30.89 SEASONAL ALLERGIC RHINITIS DUE TO OTHER ALLERGIC TRIGGER: ICD-10-CM

## 2025-03-24 PROCEDURE — 99214 OFFICE O/P EST MOD 30 MIN: CPT | Performed by: FAMILY MEDICINE

## 2025-03-24 PROCEDURE — 93000 ELECTROCARDIOGRAM COMPLETE: CPT | Performed by: FAMILY MEDICINE

## 2025-03-24 RX ORDER — OLOPATADINE HYDROCHLORIDE AND MOMETASONE FUROATE 25; 665 UG/1; UG/1
2 SPRAY, METERED NASAL DAILY PRN
Qty: 29 G | Refills: 5 | Status: SHIPPED | OUTPATIENT
Start: 2025-03-24

## 2025-03-24 SDOH — ECONOMIC STABILITY: FOOD INSECURITY: WITHIN THE PAST 12 MONTHS, THE FOOD YOU BOUGHT JUST DIDN'T LAST AND YOU DIDN'T HAVE MONEY TO GET MORE.: NEVER TRUE

## 2025-03-24 SDOH — ECONOMIC STABILITY: FOOD INSECURITY: WITHIN THE PAST 12 MONTHS, YOU WORRIED THAT YOUR FOOD WOULD RUN OUT BEFORE YOU GOT MONEY TO BUY MORE.: NEVER TRUE

## 2025-03-24 ASSESSMENT — PATIENT HEALTH QUESTIONNAIRE - PHQ9
SUM OF ALL RESPONSES TO PHQ QUESTIONS 1-9: 0
2. FEELING DOWN, DEPRESSED OR HOPELESS: NOT AT ALL
1. LITTLE INTEREST OR PLEASURE IN DOING THINGS: NOT AT ALL
SUM OF ALL RESPONSES TO PHQ QUESTIONS 1-9: 0

## 2025-03-24 NOTE — PROGRESS NOTES
[facility]  [facility]  [address] [place] [id number]  Dept: [phone]  Dept Fax: [phone]     3/24/2025      Charlene G Jose Roberto   1970     Chief Complaint   Patient presents with    Pre-op Exam     Vein surgery- both legs, Greg Campos MD, 4.1.25, [facility]        HPI  Pt comes in today for preop visit. He is scheduled to have surgery for varicose veins. Has tolerated previous procedures without issue in past. Scheduled to get labs drawn tomorrow.    Couple of acute concerns:    ALLERGIES: Has been using nasal spray (Ryaltris) successfully prn from allergist. Running low, requesting I refill for him.    SCROTUM: Concern of bump in scrotum, unclear if new or old. No symptoms with this.     Wt Readings from Last 5 Encounters:   03/24/25 110.9 kg (244 lb 9.6 oz)   02/06/25 112.5 kg (248 lb)   11/14/24 112.5 kg (248 lb)   10/24/24 113.4 kg (250 lb)   09/19/24 111.5 kg (245 lb 12.8 oz)         3/24/2025     4:00 PM 2/5/2024    10:44 AM 7/6/2023    11:34 PM 1/31/2022    11:52 AM 1/10/2020     7:47 AM 6/4/2019     9:13 AM 9/1/2017    12:15 PM   PHQ Scores   PHQ2 Score 0 0 0 0 0 0 0   PHQ9 Score 0 0 0 0 0 0 0     Interpretation of Total Score Depression Severity: 1-4 = Minimal depression, 5-9 = Mild depression, 10-14 = Moderate depression, 15-19 = Moderately severe depression, 20-27 = Severe depression     Prior to Visit Medications    Medication Sig Taking? Authorizing Provider   Multiple Vitamin (MULTIVITAMIN ADULT) TABS Take by mouth Yes Provider, MD Tan       Past Medical History:   Diagnosis Date    Chronic GERD 10/19/2023    Essential hypertension     Rhinitis         Social History     Tobacco Use    Smoking status: Former     Types: Cigars     Quit date: 8/31/2021     Years since quitting: 3.5    Smokeless tobacco: Never    Tobacco comments:     light cigar smoker/ 1 every month or so,   Vaping Use    Vaping status: Never Used   Substance Use

## 2025-03-24 NOTE — PROGRESS NOTES
Charlene Cid    Age 54 y.o.    male    1970    MRN [id number]    4/1/2025  Arrival Time_____________  OR Time____________134 Min     Procedure(s):  LEFT ENDOVENOUS RADIOFREQUENCY ABLATION OF GREATER SAPHENOUS VEIN, RIGHT ENDOVENOUS RADIOFREQUENCY ABLATION BELOW KNEE GREATER SAPHENOUS VEIN, BILATERAL ENDOVENOUS RADIOFREQUENCY ABLATION OF LESSER SAPHENOUS VEIN, AND BILATERAL STAB PHLEBECTOMIES                      General    Surgeon(s):  Greg Campos, MD       Phone [phone] ([place])     [facility]  Date  Info Source  Home  Cell         Work  _____________________________________________________________________  _____________________________________________________________________  _____________________________________________________________________  _____________________________________________________________________  _____________________________________________________________________    PCP _____________________________ Phone_________________     H&P  ________________  Bringing      Chart              Epic      DOS      Called________  EKG ________________   Bringing      Chart              Epic      DOS      Called________  LABS________________   Bringing     Chart              Epic      DOS      Called________  Cardiac Clearance ______ Bringing      Chart              Epic      DOS      Called________  Pulmonary Clearance____ Bringing      Chart              Epic      DOS      Called________    Cardiologist________________________ Phone___________________________  Pulmonologist_______________________Phone___________________________    ? Advance Directives   ? Buddhism concerns / Waiver on Chart            PAT Communications________________  ? Pre-op Instructions Given /Understood          _________________________________  ? Directions to Surgery Center                          _________________________________  ? Transportation Home_______________      __________________________________  ?

## 2025-03-24 NOTE — PATIENT INSTRUCTIONS
[facility] Locations - No appointment necessary.  ? indicates the location is open Saturdays in addition to Monday through Friday.   Call your preferred location for test preparation, business hours and other information you need.   [facility] accepts all insurances.  [facility]EN[facility]  E[place]  [place]    ? [place]   [address]   [address]   [place] [address]    Ph: [phone]  [facility]   [address] [place] [address]    Ph: [phone]   ? [place]   [address]    [place] [address]    Ph: [phone]     [facility]   [address] [place] [address]    Ph: [phone] ? [place]   [address] [address]   Ph: [phone]  ? [facility]   [address] [place] [address]    Ph: [phone]      [place]   [address] [place] [address]   Ph: [phone]     [facility]  [address] [place] [address]   Ph: [phone]    [place]    ? [facility]   [address] [address]    Ph: [phone]  [facility]   [address] [place] [address]   Ph: [phone]  [place]   [address] [place], [place] [address].   [facility], [address]    PH: [phone]    [facility]   [address] [place] [address]    Ph: [phone]  [place]  [address] [place] [address]  Ph: [phone]  [facility]   [address] [place] [address]    Ph: [phone]

## 2025-03-25 ENCOUNTER — TELEPHONE (OUTPATIENT)
Dept: VASCULAR SURGERY | Age: 55
End: 2025-03-25

## 2025-03-25 DIAGNOSIS — Z01.818 PREOP TESTING: ICD-10-CM

## 2025-03-25 DIAGNOSIS — I83.893 SYMPTOMATIC VARICOSE VEINS OF BOTH LOWER EXTREMITIES: ICD-10-CM

## 2025-03-25 LAB
BACTERIA URNS QL MICRO: NORMAL /HPF
BILIRUB UR QL STRIP.AUTO: NEGATIVE
CLARITY UR: CLEAR
COLOR UR: YELLOW
EPI CELLS #/AREA URNS AUTO: 0 /HPF (ref 0–5)
GLUCOSE UR STRIP.AUTO-MCNC: NEGATIVE MG/DL
HGB UR QL STRIP.AUTO: NEGATIVE
HYALINE CASTS #/AREA URNS AUTO: 0 /LPF (ref 0–8)
KETONES UR STRIP.AUTO-MCNC: NEGATIVE MG/DL
LEUKOCYTE ESTERASE UR QL STRIP.AUTO: NEGATIVE
NITRITE UR QL STRIP.AUTO: NEGATIVE
PH UR STRIP.AUTO: 7 [PH] (ref 5–8)
PROT UR STRIP.AUTO-MCNC: ABNORMAL MG/DL
RBC CLUMPS #/AREA URNS AUTO: 1 /HPF (ref 0–4)
SP GR UR STRIP.AUTO: 1.02 (ref 1–1.03)
UA DIPSTICK W REFLEX MICRO PNL UR: YES
URN SPEC COLLECT METH UR: ABNORMAL
UROBILINOGEN UR STRIP-ACNC: 0.2 E.U./DL
WBC #/AREA URNS AUTO: 1 /HPF (ref 0–5)

## 2025-03-25 ASSESSMENT — ENCOUNTER SYMPTOMS
VOMITING: 0
NAUSEA: 0
EYE PAIN: 0
EYE ITCHING: 0
ABDOMINAL PAIN: 0
SHORTNESS OF BREATH: 0
CONSTIPATION: 0
RHINORRHEA: 1
SORE THROAT: 0
WHEEZING: 0
COUGH: 0
DIARRHEA: 0

## 2025-03-25 NOTE — TELEPHONE ENCOUNTER
Called pt and LVM to collect $1,500 stab phlebectomy fee prior to surgery with Dr. Campos on 4/1/25.

## 2025-03-26 LAB
ANION GAP SERPL CALCULATED.3IONS-SCNC: 7 MMOL/L (ref 3–16)
APTT BLD: 32.8 SEC (ref 22.1–36.4)
BUN SERPL-MCNC: 14 MG/DL (ref 7–20)
CALCIUM SERPL-MCNC: 9.1 MG/DL (ref 8.3–10.6)
CHLORIDE SERPL-SCNC: 105 MMOL/L (ref 99–110)
CO2 SERPL-SCNC: 28 MMOL/L (ref 21–32)
CREAT SERPL-MCNC: 0.9 MG/DL (ref 0.9–1.3)
DEPRECATED RDW RBC AUTO: 14.3 % (ref 12.4–15.4)
GFR SERPLBLD CREATININE-BSD FMLA CKD-EPI: >90 ML/MIN/{1.73_M2}
GLUCOSE SERPL-MCNC: 86 MG/DL (ref 70–99)
HCT VFR BLD AUTO: 50.3 % (ref 40.5–52.5)
HGB BLD-MCNC: 17.1 G/DL (ref 13.5–17.5)
INR PPP: 1.01 (ref 0.85–1.15)
MCH RBC QN AUTO: 30.2 PG (ref 26–34)
MCHC RBC AUTO-ENTMCNC: 34 G/DL (ref 31–36)
MCV RBC AUTO: 88.7 FL (ref 80–100)
PLATELET # BLD AUTO: 157 K/UL (ref 135–450)
PMV BLD AUTO: 9.1 FL (ref 5–10.5)
POTASSIUM SERPL-SCNC: 3.8 MMOL/L (ref 3.5–5.1)
PROTHROMBIN TIME: 13.5 SEC (ref 11.9–14.9)
RBC # BLD AUTO: 5.67 M/UL (ref 4.2–5.9)
SODIUM SERPL-SCNC: 140 MMOL/L (ref 136–145)
WBC # BLD AUTO: 3.1 K/UL (ref 4–11)

## 2025-03-27 ENCOUNTER — TELEPHONE (OUTPATIENT)
Dept: ADMINISTRATIVE | Age: 55
End: 2025-03-27

## 2025-03-27 ENCOUNTER — OFFICE VISIT (OUTPATIENT)
Dept: BARIATRICS/WEIGHT MGMT | Age: 55
End: 2025-03-27
Payer: COMMERCIAL

## 2025-03-27 VITALS
BODY MASS INDEX: 35.22 KG/M2 | HEART RATE: 71 BPM | HEIGHT: 70 IN | RESPIRATION RATE: 18 BRPM | OXYGEN SATURATION: 100 % | WEIGHT: 246 LBS | SYSTOLIC BLOOD PRESSURE: 128 MMHG | DIASTOLIC BLOOD PRESSURE: 74 MMHG

## 2025-03-27 DIAGNOSIS — E66.9 OBESITY (BMI 30-39.9): ICD-10-CM

## 2025-03-27 DIAGNOSIS — Z98.84 S/P LAPAROSCOPIC SLEEVE GASTRECTOMY: Primary | ICD-10-CM

## 2025-03-27 DIAGNOSIS — E78.00 ELEVATED LOW DENSITY LIPOPROTEIN (LDL) CHOLESTEROL LEVEL: ICD-10-CM

## 2025-03-27 PROCEDURE — 99214 OFFICE O/P EST MOD 30 MIN: CPT | Performed by: SURGERY

## 2025-03-27 PROCEDURE — G2211 COMPLEX E/M VISIT ADD ON: HCPCS | Performed by: SURGERY

## 2025-03-27 NOTE — PROGRESS NOTES
Date and time of surgery :   4/1/25@1200           Arrival Time:  1000     Bring Picture ID and insurance card.  Please wear simple, loose fitting clothing to the hospital.   Do not bring valuables (money, credit cards, checkbooks, etc.)   Do not wear any makeup (including  eye makeup) and no nail polish or artificial nails on your fingers or toes.  DO NOT wear any jewelry or piercings on day of surgery.  All body piercing jewelry must be removed.  If you have dentures, they will be removed before going to the OR; we will provide you a container.  If you wear contact lenses or glasses, they will be removed; please bring a case for them.  Shower the evening before or morning of surgery with antibacterial soap.  Nothing to eat or drink after midnight the day before surgery.   You may brush your teeth and gargle the morning of surgery.  DO NOT SWALLOW WATER.   Do not take any morning meds the day of your surgery.  Aspirin, Ibuprofen, Advil, Naproxen, Vitamin E and other Anti-inflammatory products and supplements should be stopped for 5 -7days before surgery or as directed by your physician.  Do not smoke or drink any alcoholic beverages 24 hours prior to surgery.  This includes NA Beer. Refrain from the usage of any recreational drugs, including non-prescribed prescription drugs.   You MUST plan for a responsible adult to stay on site while you are here and take you home after your surgery. You will not be allowed to leave alone or drive yourself home. It is strongly suggested someone stay with you the first 24 hrs. Your surgery will be cancelled if you do not have a ride home.  Notify your Surgeon if you develop any illness between now and time of surgery. Cough, cold, fever, sore throat, nausea, vomiting, etc.  Please notify your surgeon if you experience dizziness, shortness of breath or blurred vision between now & the time of your surgery  To provide excellent care visitors will be limited to two per room at any

## 2025-03-27 NOTE — TELEPHONE ENCOUNTER
I called [employer] and spoke to Shadia LEACH who confirmed that Ryaltris is not covered by the patient's plan.

## 2025-03-27 NOTE — PROGRESS NOTES
[facility]   [facility]  Sabrina Villanueva MD, FACS, [address], [address] [place] [id number] .  Phone: [phone]  Fax: [phone]            Chief Complaint   Patient presents with    Bariatrics Post Op Follow Up     1yr1mo s/p sleeve 2/12/24           HPI:    Charlene Cid is a very pleasant 54 y.o.  male , Body mass index is 35.3 kg/m².. And multiple medical problems who is presenting for bariatric follow up care.   Charlene is s/p laparoscopic sleeve gastrectomy by me 2/2024. Initial Weight: 343 lbs, Weight Loss: 97 lbs.   Comes today to the clinic without any complaints. Patient denies any nausea, vomiting, fevers, chills, shortness of breath, chest pain, constipation or urinary symptoms. Denies any heartburn nor dysphagia.  Patient informed us today that they are taking the multivitamins as instructed.  Patient denies any tingling, weakness,  numbness nor any neurological symptoms.  Charlene is feeling very well, and is very active. Patient is very pleased with the weight loss and resolution of co-morbid conditions.      Pain Assessment   Denies any abdominal pain     Past Medical History:   Diagnosis Date    Chronic GERD 10/19/2023    Essential hypertension     Rhinitis      Past Surgical History:   Procedure Laterality Date    APPENDECTOMY      COLONOSCOPY N/A 01/20/2020    COLONOSCOPY W/ ANESTHESIA performed by Reinaldo Lozada MD at [facility] ENDOSCOPY    KNEE ARTHROSCOPY Bilateral     SEPTOPLASTY Bilateral 11/18/2022    BILATERAL INFERIOR TURBINATE REDUCTION AND SEPTOPLASTY performed by Jonah Pires MD at [facility] OR    SLEEVE GASTRECTOMY N/A 2/12/2024    LAPAROSCOPIC SLEEVE GASTRECTOMY AND VENTRAL HERNIA REPAIR performed by Sabrina Villanueva MD at [facility] OR    UPPER GASTROINTESTINAL ENDOSCOPY N/A 12/1/2023    EGD BIOPSY performed by Sabrina Villanueva MD at [facility] ENDOSCOPY     Family History   Problem Relation Age of Onset    Heart Disease Father

## 2025-03-27 NOTE — TELEPHONE ENCOUNTER
Submitted PA for Ryaltris 665-25MCG/ACT suspension  Via CMM Key: [id number] STATUS: This request cannot be processed due to the medication is not covered by the plan.     If this requires a response please respond to the pool ( [facility] MEDICATION PRE-AUTH).      Thank you please advise patient.

## 2025-03-27 NOTE — PROGRESS NOTES
Dietary Assessment Note      Vitals:   Vitals:    03/27/25 0853   BP: 128/74   Pulse: 71   Resp: 18   SpO2: 100%   Weight: 111.6 kg (246 lb)   Height: 1.778 m (5' 10\")    Patient lost 2 lbs over 4 mo.     Total Weight Loss: 97 lbs    Labs reviewed: labs are reviewed, up to date and normal    Protein intake: 60-80 grams/day     Fluid intake: 60 oz of water +  decaf coffee     Multivitamin/mineral intake: yes    2 OTC MVI    Calcium intake: no (discussed)     Other: none    Exercise: yes 3x/week walking 3-4 miles       Nutrition Assessment: 1 yr 1 mo post-op visit. Pt typically meal preps B&L for the week.      Breakfast: egg + sausage ziyad or protein shake  Snack: none  Lunch: grilled chix + cheese stick + wheat thins   Snack: none or wheat thins   Dinner: chix nachos from restaurant   Snack: none    Amount able to eat per sitting: ~3/4 -1 cup    Following 30/30/30 rule: yes     Food Intolerances/issues: none    Client Concerns: none    Goals:   - Continue diet and exercise plan   - Take Calcium supplement 2x/day separately from each other     Handouts: MVI alternative list     Plan: F/U per provider and as needed     Mirela Puentes RD

## 2025-03-27 NOTE — PATIENT INSTRUCTIONS
Patient received dietary handouts and education.    Goals:   - Continue diet and exercise plan   - Take Calcium supplement 2x/day separately from each other

## 2025-03-28 NOTE — TELEPHONE ENCOUNTER
Called pt and LVM to collect $1,500 stab phlebectomy fee prior to surgery on 4/1/25 with Dr. Campos. I also LVM for pt's spouse, April, to try to contact pt as well.

## 2025-03-31 DIAGNOSIS — I83.893 SYMPTOMATIC VARICOSE VEINS OF BOTH LOWER EXTREMITIES: Primary | ICD-10-CM

## 2025-03-31 NOTE — TELEPHONE ENCOUNTER
Attempted to call pt to collect $1,500 stab phlebectomy fee. Unable to LVM. Tried 2x. LVM for pt's spouse, April, to have pt call us back to pay fee. Will attempt to call again later.

## 2025-04-01 ENCOUNTER — ANESTHESIA EVENT (OUTPATIENT)
Dept: OPERATING ROOM | Age: 55
End: 2025-04-01
Payer: COMMERCIAL

## 2025-04-01 ENCOUNTER — ANESTHESIA (OUTPATIENT)
Dept: OPERATING ROOM | Age: 55
End: 2025-04-01
Payer: COMMERCIAL

## 2025-04-01 ENCOUNTER — HOSPITAL ENCOUNTER (OUTPATIENT)
Age: 55
Setting detail: OUTPATIENT SURGERY
Discharge: HOME | End: 2025-04-01
Attending: SURGERY | Admitting: SURGERY
Payer: COMMERCIAL

## 2025-04-01 VITALS
BODY MASS INDEX: 35.99 KG/M2 | TEMPERATURE: 98.6 F | OXYGEN SATURATION: 97 % | HEART RATE: 75 BPM | HEIGHT: 69 IN | SYSTOLIC BLOOD PRESSURE: 146 MMHG | DIASTOLIC BLOOD PRESSURE: 76 MMHG | WEIGHT: 243 LBS | RESPIRATION RATE: 14 BRPM

## 2025-04-01 DIAGNOSIS — M79.609 POSTOPERATIVE PAIN OF EXTREMITY: Primary | ICD-10-CM

## 2025-04-01 DIAGNOSIS — G89.18 POSTOPERATIVE PAIN OF EXTREMITY: Primary | ICD-10-CM

## 2025-04-01 PROBLEM — I83.892 SYMPTOMATIC VARICOSE VEINS OF LEFT LOWER EXTREMITY: Status: ACTIVE | Noted: 2025-04-01

## 2025-04-01 PROBLEM — I83.891 SYMPTOMATIC VARICOSE VEINS OF RIGHT LOWER EXTREMITY: Status: ACTIVE | Noted: 2025-04-01

## 2025-04-01 PROCEDURE — 6360000002 HC RX W HCPCS: Performed by: NURSE ANESTHETIST, CERTIFIED REGISTERED

## 2025-04-01 PROCEDURE — 7100000010 HC PHASE II RECOVERY - FIRST 15 MIN: Performed by: SURGERY

## 2025-04-01 PROCEDURE — 2580000003 HC RX 258: Performed by: NURSE ANESTHETIST, CERTIFIED REGISTERED

## 2025-04-01 PROCEDURE — 7100000001 HC PACU RECOVERY - ADDTL 15 MIN: Performed by: SURGERY

## 2025-04-01 PROCEDURE — 2500000003 HC RX 250 WO HCPCS: Performed by: SURGERY

## 2025-04-01 PROCEDURE — 2709999900 HC NON-CHARGEABLE SUPPLY: Performed by: SURGERY

## 2025-04-01 PROCEDURE — 2500000003 HC RX 250 WO HCPCS: Performed by: NURSE ANESTHETIST, CERTIFIED REGISTERED

## 2025-04-01 PROCEDURE — 3700000000 HC ANESTHESIA ATTENDED CARE: Performed by: SURGERY

## 2025-04-01 PROCEDURE — 2580000003 HC RX 258: Performed by: SURGERY

## 2025-04-01 PROCEDURE — 6360000002 HC RX W HCPCS: Performed by: SURGERY

## 2025-04-01 PROCEDURE — 6360000002 HC RX W HCPCS: Performed by: ANESTHESIOLOGY

## 2025-04-01 PROCEDURE — 3700000001 HC ADD 15 MINUTES (ANESTHESIA): Performed by: SURGERY

## 2025-04-01 PROCEDURE — 3600000014 HC SURGERY LEVEL 4 ADDTL 15MIN: Performed by: SURGERY

## 2025-04-01 PROCEDURE — MISCSTAB COSMETIC STAB PHLEBECTOMY: Performed by: SURGERY

## 2025-04-01 PROCEDURE — C1888 ENDOVAS NON-CARDIAC ABL CATH: HCPCS | Performed by: SURGERY

## 2025-04-01 PROCEDURE — 7100000000 HC PACU RECOVERY - FIRST 15 MIN: Performed by: SURGERY

## 2025-04-01 PROCEDURE — 7100000011 HC PHASE II RECOVERY - ADDTL 15 MIN: Performed by: SURGERY

## 2025-04-01 PROCEDURE — 3600000004 HC SURGERY LEVEL 4 BASE: Performed by: SURGERY

## 2025-04-01 PROCEDURE — C1894 INTRO/SHEATH, NON-LASER: HCPCS | Performed by: SURGERY

## 2025-04-01 RX ORDER — PHENYLEPHRINE HCL IN 0.9% NACL 1 MG/10 ML
SYRINGE (ML) INTRAVENOUS
Status: DISCONTINUED | OUTPATIENT
Start: 2025-04-01 | End: 2025-04-01 | Stop reason: SDUPTHER

## 2025-04-01 RX ORDER — ONDANSETRON 2 MG/ML
INJECTION INTRAMUSCULAR; INTRAVENOUS
Status: DISCONTINUED | OUTPATIENT
Start: 2025-04-01 | End: 2025-04-01 | Stop reason: SDUPTHER

## 2025-04-01 RX ORDER — MEPERIDINE HYDROCHLORIDE 50 MG/ML
12.5 INJECTION INTRAMUSCULAR; INTRAVENOUS; SUBCUTANEOUS EVERY 5 MIN PRN
Status: DISCONTINUED | OUTPATIENT
Start: 2025-04-01 | End: 2025-04-01 | Stop reason: HOSPADM

## 2025-04-01 RX ORDER — GLYCOPYRROLATE 0.2 MG/ML
INJECTION INTRAMUSCULAR; INTRAVENOUS
Status: DISCONTINUED | OUTPATIENT
Start: 2025-04-01 | End: 2025-04-01 | Stop reason: SDUPTHER

## 2025-04-01 RX ORDER — SODIUM CHLORIDE 9 MG/ML
INJECTION, SOLUTION INTRAVENOUS
Status: DISCONTINUED | OUTPATIENT
Start: 2025-04-01 | End: 2025-04-01 | Stop reason: SDUPTHER

## 2025-04-01 RX ORDER — NALOXONE HYDROCHLORIDE 0.4 MG/ML
INJECTION, SOLUTION INTRAMUSCULAR; INTRAVENOUS; SUBCUTANEOUS PRN
Status: DISCONTINUED | OUTPATIENT
Start: 2025-04-01 | End: 2025-04-01 | Stop reason: HOSPADM

## 2025-04-01 RX ORDER — LABETALOL HYDROCHLORIDE 5 MG/ML
5 INJECTION, SOLUTION INTRAVENOUS EVERY 10 MIN PRN
Status: DISCONTINUED | OUTPATIENT
Start: 2025-04-01 | End: 2025-04-01 | Stop reason: HOSPADM

## 2025-04-01 RX ORDER — SODIUM CHLORIDE 0.9 % (FLUSH) 0.9 %
5-40 SYRINGE (ML) INJECTION EVERY 12 HOURS SCHEDULED
Status: DISCONTINUED | OUTPATIENT
Start: 2025-04-01 | End: 2025-04-01 | Stop reason: HOSPADM

## 2025-04-01 RX ORDER — SODIUM CHLORIDE, SODIUM LACTATE, POTASSIUM CHLORIDE, CALCIUM CHLORIDE 600; 310; 30; 20 MG/100ML; MG/100ML; MG/100ML; MG/100ML
INJECTION, SOLUTION INTRAVENOUS CONTINUOUS
Status: DISCONTINUED | OUTPATIENT
Start: 2025-04-01 | End: 2025-04-01 | Stop reason: HOSPADM

## 2025-04-01 RX ORDER — SODIUM CHLORIDE 9 MG/ML
INJECTION, SOLUTION INTRAVENOUS PRN
Status: DISCONTINUED | OUTPATIENT
Start: 2025-04-01 | End: 2025-04-01 | Stop reason: HOSPADM

## 2025-04-01 RX ORDER — SODIUM CHLORIDE 0.9 % (FLUSH) 0.9 %
5-40 SYRINGE (ML) INJECTION PRN
Status: DISCONTINUED | OUTPATIENT
Start: 2025-04-01 | End: 2025-04-01 | Stop reason: HOSPADM

## 2025-04-01 RX ORDER — LIDOCAINE HYDROCHLORIDE 10 MG/ML
0.3 INJECTION, SOLUTION EPIDURAL; INFILTRATION; INTRACAUDAL; PERINEURAL
Status: DISCONTINUED | OUTPATIENT
Start: 2025-04-01 | End: 2025-04-01 | Stop reason: HOSPADM

## 2025-04-01 RX ORDER — PROPOFOL 10 MG/ML
INJECTION, EMULSION INTRAVENOUS
Status: DISCONTINUED | OUTPATIENT
Start: 2025-04-01 | End: 2025-04-01 | Stop reason: SDUPTHER

## 2025-04-01 RX ORDER — LIDOCAINE HYDROCHLORIDE 20 MG/ML
INJECTION, SOLUTION INFILTRATION; PERINEURAL
Status: DISCONTINUED | OUTPATIENT
Start: 2025-04-01 | End: 2025-04-01 | Stop reason: SDUPTHER

## 2025-04-01 RX ORDER — DEXAMETHASONE SODIUM PHOSPHATE 4 MG/ML
INJECTION, SOLUTION INTRA-ARTICULAR; INTRALESIONAL; INTRAMUSCULAR; INTRAVENOUS; SOFT TISSUE
Status: DISCONTINUED | OUTPATIENT
Start: 2025-04-01 | End: 2025-04-01 | Stop reason: SDUPTHER

## 2025-04-01 RX ORDER — DIPHENHYDRAMINE HYDROCHLORIDE 50 MG/ML
12.5 INJECTION, SOLUTION INTRAMUSCULAR; INTRAVENOUS
Status: DISCONTINUED | OUTPATIENT
Start: 2025-04-01 | End: 2025-04-01 | Stop reason: HOSPADM

## 2025-04-01 RX ORDER — OXYCODONE HYDROCHLORIDE 5 MG/1
10 TABLET ORAL PRN
Status: DISCONTINUED | OUTPATIENT
Start: 2025-04-01 | End: 2025-04-01 | Stop reason: HOSPADM

## 2025-04-01 RX ORDER — HYDROCODONE BITARTRATE AND ACETAMINOPHEN 5; 325 MG/1; MG/1
1 TABLET ORAL EVERY 6 HOURS PRN
Qty: 10 TABLET | Refills: 0 | Status: SHIPPED | OUTPATIENT
Start: 2025-04-01 | End: 2025-04-08

## 2025-04-01 RX ORDER — SODIUM CHLORIDE, SODIUM LACTATE, POTASSIUM CHLORIDE, CALCIUM CHLORIDE 600; 310; 30; 20 MG/100ML; MG/100ML; MG/100ML; MG/100ML
INJECTION, SOLUTION INTRAVENOUS
Status: DISCONTINUED | OUTPATIENT
Start: 2025-04-01 | End: 2025-04-01 | Stop reason: SDUPTHER

## 2025-04-01 RX ORDER — OXYCODONE HYDROCHLORIDE 5 MG/1
5 TABLET ORAL PRN
Status: DISCONTINUED | OUTPATIENT
Start: 2025-04-01 | End: 2025-04-01 | Stop reason: HOSPADM

## 2025-04-01 RX ORDER — EPHEDRINE SULFATE 50 MG/ML
INJECTION, SOLUTION INTRAVENOUS
Status: DISCONTINUED | OUTPATIENT
Start: 2025-04-01 | End: 2025-04-01 | Stop reason: SDUPTHER

## 2025-04-01 RX ORDER — FENTANYL CITRATE 50 UG/ML
INJECTION, SOLUTION INTRAMUSCULAR; INTRAVENOUS
Status: DISCONTINUED | OUTPATIENT
Start: 2025-04-01 | End: 2025-04-01 | Stop reason: SDUPTHER

## 2025-04-01 RX ORDER — ONDANSETRON 2 MG/ML
4 INJECTION INTRAMUSCULAR; INTRAVENOUS
Status: DISCONTINUED | OUTPATIENT
Start: 2025-04-01 | End: 2025-04-01 | Stop reason: HOSPADM

## 2025-04-01 RX ORDER — MAGNESIUM HYDROXIDE 1200 MG/15ML
LIQUID ORAL CONTINUOUS PRN
Status: COMPLETED | OUTPATIENT
Start: 2025-04-01 | End: 2025-04-01

## 2025-04-01 RX ADMIN — Medication 100 MCG: at 13:25

## 2025-04-01 RX ADMIN — SODIUM CHLORIDE: 9 INJECTION, SOLUTION INTRAVENOUS at 14:04

## 2025-04-01 RX ADMIN — MEPERIDINE HYDROCHLORIDE 12.5 MG: 50 INJECTION INTRAMUSCULAR; INTRAVENOUS; SUBCUTANEOUS at 15:56

## 2025-04-01 RX ADMIN — LIDOCAINE HYDROCHLORIDE 80 MG: 20 INJECTION, SOLUTION INFILTRATION; PERINEURAL at 12:07

## 2025-04-01 RX ADMIN — Medication 100 MCG: at 12:58

## 2025-04-01 RX ADMIN — PROPOFOL 50 MG: 10 INJECTION, EMULSION INTRAVENOUS at 12:23

## 2025-04-01 RX ADMIN — FENTANYL CITRATE 50 MCG: 50 INJECTION, SOLUTION INTRAMUSCULAR; INTRAVENOUS at 13:36

## 2025-04-01 RX ADMIN — SODIUM CHLORIDE, SODIUM LACTATE, POTASSIUM CHLORIDE, CALCIUM CHLORIDE: 600; 310; 30; 20 INJECTION, SOLUTION INTRAVENOUS at 12:05

## 2025-04-01 RX ADMIN — CEFAZOLIN 2000 MG: 2 INJECTION, POWDER, FOR SOLUTION INTRAVENOUS at 12:15

## 2025-04-01 RX ADMIN — Medication 20 MG: at 14:26

## 2025-04-01 RX ADMIN — DEXAMETHASONE SODIUM PHOSPHATE 4 MG: 4 INJECTION, SOLUTION INTRA-ARTICULAR; INTRALESIONAL; INTRAMUSCULAR; INTRAVENOUS; SOFT TISSUE at 13:05

## 2025-04-01 RX ADMIN — PROPOFOL 200 MG: 10 INJECTION, EMULSION INTRAVENOUS at 12:07

## 2025-04-01 RX ADMIN — ONDANSETRON 4 MG: 2 INJECTION INTRAMUSCULAR; INTRAVENOUS at 13:08

## 2025-04-01 RX ADMIN — EPHEDRINE SULFATE 10 MG: 50 INJECTION, SOLUTION INTRAVENOUS at 13:31

## 2025-04-01 RX ADMIN — GLYCOPYRROLATE 0.4 MG: 0.2 INJECTION INTRAMUSCULAR; INTRAVENOUS at 13:07

## 2025-04-01 RX ADMIN — SODIUM CHLORIDE: 9 INJECTION, SOLUTION INTRAVENOUS at 13:24

## 2025-04-01 RX ADMIN — FENTANYL CITRATE 50 MCG: 50 INJECTION, SOLUTION INTRAMUSCULAR; INTRAVENOUS at 12:47

## 2025-04-01 ASSESSMENT — PAIN - FUNCTIONAL ASSESSMENT: PAIN_FUNCTIONAL_ASSESSMENT: 0-10

## 2025-04-01 ASSESSMENT — PAIN SCALES - GENERAL
PAINLEVEL_OUTOF10: 0
PAINLEVEL_OUTOF10: 0

## 2025-04-01 NOTE — DISCHARGE INSTRUCTIONS
1) [facility] at [phone] to confirm 2-3 day follow up appointment (should be already scheduled).    2) Keep operative leg(s) wrapped as when discharged from the hospital until seen in the office. Call if excessive bloody staining or significant foot numbness but              do not remove the wraps unless instructed by Dr. Campos or his staff.    3) Elevate legs at all times when not walking until seen in the office. On day of surgery may get up to bathroom, kitchen table and to get to the bedroom otherwise keep            legs elevated and limit activities. No heavy lifting or exercise.    4) Beginning the day after surgery may walk as tolerated with no prolonged standing, heavy lifting or exercise. May not drive until seen in the office and while taking narcotics for pain.    5) Bring compression stockings to first postoperative office appointment.    6) Prescription pain medications will be electronically prescribed and available at your preferred pharmacy as recorded in the [facility] chart unless other arrangements are necessary or requested.      ANESTHESIA DISCHARGE INSTRUCTIONS    You are under the influence of drugs- do not drink alcohol, drive a car, operate machinery(such as power tools, kitchen appliances, etc), sign legal documents, or make any important decisions for 24 hours (or while on pain medications).   Children should not ride bikes or skate boards or play on gym sets  for 24 hours after surgery.  A responsible adult should be with you for 24 hours.  Rest at home today- increase activity as tolerated.  Progress slowly to a regular diet unless your physician has instructed you otherwise. Drink plenty of water.    CALL YOUR DOCTOR IF YOU:  Have moderate to severe nausea or vomiting AND are unable to hold down fluids or prescribed medications.  Have bright red bloody drainage from your dressing that won't stop oozing.  Do not get relief with your pain medication    NORMAL

## 2025-04-01 NOTE — PROGRESS NOTES
Discharge instructions reviewed with pat and wife April, both verbalized understanding, pt ready to be discharged home.

## 2025-04-01 NOTE — BRIEF OP NOTE
Brief Postoperative Note      Patient: Charlene Cid  YOB: 1970  MRN: [id number]    Date of Procedure: 4/1/2025    Pre-Op Diagnosis Codes:      * Symptomatic varicose veins of both lower extremities [I83.893]    Post-Op Diagnosis: Same       Procedure(s):  LEFT ENDOVENOUS RADIOFREQUENCY ABLATION OF GREATER SAPHENOUS VEIN, RIGHT ENDOVENOUS RADIOFREQUENCY ABLATION BELOW KNEE GREATER SAPHENOUS VEIN, BILATERAL ENDOVENOUS RADIOFREQUENCY ABLATION OF LESSER SAPHENOUS VEIN, AND BILATERAL STAB PHLEBECTOMIES    Surgeon(s):  Greg Campos MD    Assistant:  Surgical Assistant: Renee Brown    Anesthesia: General    Estimated Blood Loss (mL): less than 50     Complications: None    Specimens:   * No specimens in log *    Implants:  * No implants in log *      Drains: * No LDAs found *    Findings:  Infection Present At Time Of Surgery (PATOS) (choose all levels that have infection present):  No infection present  Other Findings: as above    Electronically signed by Greg Campos MD on 4/1/2025 at 3:37 PM

## 2025-04-01 NOTE — PROGRESS NOTES
Received in PACU. Report received from OR  nurse and CRNA. Pt sleeping, shivering noted PRN demerol given at 1556  with relief.

## 2025-04-01 NOTE — H&P
Update History & Physical    The patient's History and Physical of March 24, 2025 was reviewed with the patient and I examined the patient. There was no change. The surgical site was confirmed by the patient and me.       Plan: The risks, benefits, expected outcome, and alternative to the recommended procedure have been discussed with the patient. Patient understands and wants to proceed with the procedure.     Electronically signed by Greg Campos MD on 4/1/2025 at 4:25 PM

## 2025-04-01 NOTE — ANESTHESIA POSTPROCEDURE EVALUATION
Department of Anesthesiology  Postprocedure Note    Patient: Charlene Cid  MRN: [id number]  YOB: 1970  Date of evaluation: 4/1/2025    Procedure Summary       Date: 04/01/25 Room / Location: [address]    Anesthesia Start: 1206 Anesthesia Stop: 1551    Procedure: LEFT ENDOVENOUS RADIOFREQUENCY ABLATION OF GREATER SAPHENOUS VEIN, RIGHT ENDOVENOUS RADIOFREQUENCY ABLATION BELOW KNEE GREATER SAPHENOUS VEIN, BILATERAL ENDOVENOUS RADIOFREQUENCY ABLATION OF LESSER SAPHENOUS VEIN, AND BILATERAL STAB PHLEBECTOMIES (Bilateral: Leg Upper) Diagnosis:       Symptomatic varicose veins of both lower extremities      (Symptomatic varicose veins of both lower extremities [I83.893])    Surgeons: Greg Campos MD Responsible Provider: Sean Valenzuela MD    Anesthesia Type: general ASA Status: 3            Anesthesia Type: No value filed.    Sukumar Phase I: Sukumar Score: 9    Sukumar Phase II: Sukumar Score: 10    Anesthesia Post Evaluation    Patient location during evaluation: PACU  Patient participation: complete - patient participated  Level of consciousness: awake and alert  Airway patency: patent  Nausea & Vomiting: no nausea and no vomiting  Cardiovascular status: blood pressure returned to baseline  Respiratory status: acceptable  Hydration status: euvolemic  Comments: VSS on transfer to phase 2 recovery.  No anesthetic complications.  Pain management: adequate    No notable events documented.

## 2025-04-01 NOTE — ANESTHESIA PRE PROCEDURE
Department of Anesthesiology  Preprocedure Note       Name:  Charlene Cid   Age:  54 y.o.  :  1970                                          MRN:  [id number]         Date:  2025      Surgeon: Surgeon(s):  Greg Campos MD    Procedure: Procedure(s):  LEFT ENDOVENOUS RADIOFREQUENCY ABLATION OF GREATER SAPHENOUS VEIN, RIGHT ENDOVENOUS RADIOFREQUENCY ABLATION BELOW KNEE GREATER SAPHENOUS VEIN, BILATERAL ENDOVENOUS RADIOFREQUENCY ABLATION OF LESSER SAPHENOUS VEIN, AND BILATERAL STAB PHLEBECTOMIES    Medications prior to admission:   Prior to Admission medications    Medication Sig Start Date End Date Taking? Authorizing Provider   Olopatadine-Mometasone (RYALTRIS) 665-25 MCG/ACT SUSP 2 sprays by Nasal route daily as needed (Allergies) 3/24/25  Yes Femi Roy,    Multiple Vitamin (MULTIVITAMIN ADULT) TABS Take by mouth    Provider, MD Tan       Current medications:    Current Facility-Administered Medications   Medication Dose Route Frequency Provider Last Rate Last Admin   • lidocaine PF 1 % injection 0.3 mL  0.3 mL IntraDERmal Once PRN Thierno Burks MD       • lactated ringers infusion   IntraVENous Continuous Thierno Burks MD       • sodium chloride flush 0.9 % injection 5-40 mL  5-40 mL IntraVENous 2 times per day Thierno Burks MD       • sodium chloride flush 0.9 % injection 5-40 mL  5-40 mL IntraVENous PRN Thierno Burks MD       • 0.9 % sodium chloride infusion   IntraVENous PRN Thierno Burks MD       • sodium chloride flush 0.9 % injection 5-40 mL  5-40 mL IntraVENous 2 times per day Greg Campos MD       • sodium chloride flush 0.9 % injection 5-40 mL  5-40 mL IntraVENous PRN Greg Campos MD       • 0.9 % sodium chloride infusion   IntraVENous PRN Greg Campos MD       • ceFAZolin (ANCEF) 2,000 mg in sodium chloride 0.9 % 50 mL IVPB (addEASE)  2,000 mg IntraVENous On Call to OR Greg Campos MD           Allergies:      PROTIME 13.5 03/25/2025 08:09 AM    INR 1.01 03/25/2025 08:09 AM    APTT 32.8 03/25/2025 08:09 AM       HCG (If Applicable): No results found for: \"PREGTESTUR\", \"PREGSERUM\", \"HCG\", \"HCGQUANT\"     ABGs: No results found for: \"PHART\", \"PO2ART\", \"XCB6NNT\", \"IDM9KIP\", \"BEART\", \"S5RVQXNU\"     Type & Screen (If Applicable):  Lab Results   Component Value Date    ABORH O POS 02/12/2024    LABANTI NEG 02/12/2024       Drug/Infectious Status (If Applicable):  No results found for: \"HIV\", \"HEPCAB\"    COVID-19 Screening (If Applicable):   Lab Results   Component Value Date/Time    COVID19 NOT DETECTED 11/10/2020 01:35 PM           Anesthesia Evaluation    Airway: Mallampati: II  TM distance: >3 FB   Neck ROM: full  Mouth opening: > = 3 FB   Dental: normal exam         Pulmonary:normal exam  breath sounds clear to auscultation  (+)     sleep apnea:                                  Cardiovascular:    (+) hypertension:        Rhythm: regular  Rate: normal                    Neuro/Psych:               GI/Hepatic/Renal:   (+) GERD:          Endo/Other:                     Abdominal:   (+) obese          Vascular:          Other Findings:       Anesthesia Plan      general     ASA 3       Induction: intravenous.    MIPS: Postoperative opioids intended and Prophylactic antiemetics administered.  Anesthetic plan and risks discussed with patient.      Plan discussed with CRNA.                Thierno Burks MD   4/1/2025

## 2025-04-01 NOTE — OP NOTE
[address] [address]                            OPERATIVE REPORT      PATIENT NAME: JAMAAL ARROYO             : 1970  MED REC NO: [id number]                      ROOM: [facility]  ACCOUNT NO: [id number]                       ADMIT DATE: 2025  PROVIDER: Greg Campos MD      DATE OF PROCEDURE:  2025    SURGEON:  Greg Campos MD    PREOPERATIVE DIAGNOSIS:  Chronic superficial venous insufficiency, bilateral legs with secondary symptomatic varicose veins.    POSTOPERATIVE DIAGNOSIS:  Chronic superficial venous insufficiency, bilateral legs with secondary symptomatic varicose veins.    PROCEDURES:    1. Right below-knee greater saphenous radiofrequency ablation.  2. Right lesser saphenous vein radiofrequency ablation.  3. Left greater saphenous vein radiofrequency ablation.  4. Left lesser saphenous vein radiofrequency ablation.  5. Stab phlebectomies, bilateral legs (total incisions 36).    ANESTHESIA:  General endotracheal.    ESTIMATED BLOOD LOSS:  Less than 50 mL.    HISTORY:  The patient is a 54-year-old gentleman, who presented to the office complaining of bilateral leg discomfort associated with large varicosities.  He underwent evaluation with a venous reflux study and demonstrated reflux in the above-stated veins.  It was recommended that he undergo treatment with radiofrequency ablation of the truncal veins with stab phlebectomies.  He agreed understanding the risks, benefits, and other options.    TECHNIQUE:  In the preoperative holding area, the patient was stood at the bedside and varicosities were marked on the skin with indelible ink.  After being taken to the operating room and after induction of anesthesia, his bilateral groins and legs were prepped and draped in a sterile fashion.  The procedure was begun by rotating his right leg externally at the hip and using ultrasound identifying the

## 2025-04-01 NOTE — PROGRESS NOTES
Patient admitted to pre-op bay 10 in preparation for surgery, VSS. Consent confirmed. IV inserted into right AC, NS infusing. Belongings on cart. NPO since 2000. Family at bedside, phone number in system for text updates, call light within reach.

## 2025-04-02 ENCOUNTER — HOSPITAL ENCOUNTER (OUTPATIENT)
Dept: ULTRASOUND IMAGING | Age: 55
Discharge: HOME | End: 2025-04-02
Payer: COMMERCIAL

## 2025-04-02 DIAGNOSIS — N50.89 SCROTAL MASS: ICD-10-CM

## 2025-04-02 PROCEDURE — 76870 US EXAM SCROTUM: CPT

## 2025-04-03 ENCOUNTER — RESULTS FOLLOW-UP (OUTPATIENT)
Dept: PRIMARY CARE CLINIC | Age: 55
End: 2025-04-03

## 2025-04-03 DIAGNOSIS — N50.89 SCROTAL MASS: Primary | ICD-10-CM

## 2025-04-03 NOTE — RESULT ENCOUNTER NOTE
Please let pt know I have reviewed US result. Needs referral to Urology to review the finding of complex cystic/solid mass that is seen next to the R testicle. Please offer location of his choice now.

## 2025-04-04 ENCOUNTER — OFFICE VISIT (OUTPATIENT)
Dept: VASCULAR SURGERY | Age: 55
End: 2025-04-04

## 2025-04-04 VITALS — SYSTOLIC BLOOD PRESSURE: 126 MMHG | OXYGEN SATURATION: 98 % | DIASTOLIC BLOOD PRESSURE: 88 MMHG | HEART RATE: 60 BPM

## 2025-04-04 DIAGNOSIS — I83.893 SYMPTOMATIC VARICOSE VEINS OF BOTH LOWER EXTREMITIES: Primary | ICD-10-CM

## 2025-04-04 PROCEDURE — 99024 POSTOP FOLLOW-UP VISIT: CPT | Performed by: SURGERY

## 2025-04-04 NOTE — PROGRESS NOTES
[facility]         Post-op Check/Notes  Date: [unfilled]   Name: Charlene Cid  [x]  RE-WRAP [] 2 WK POST-OP []OTHER      SURGEON anthony   PROCEDURE:  R BK GSV RFA + R LSV RFA + L GSV RFA + L LSV RFA + B stabs  DAYS POST-OP 3   SURG.DATE  4/1/2025    ANESTHESIA: [x]  GENERAL [] EPIDURAL  HISTORY:   [x]  PATIENT IS AMBULATORY  [x]  PATIENT HAS NO COMPLAINTS AND INDICATES THAT HE/SHE IS DOING FINE  []  PATIENT EXPRESSED SOME DIFFICULTIES WITH:   [] PAIN MEDICATION:              []  THE MEDICATION WAS INTOLERABLE        []  THE MEDICATION WAS NOT EFFECTIVE        [] THE PATIENT WAS GIVEN A NEW RX FOR:                [] THE PATIENT DECIDED TO TOLERATE PAIN WITHOUT MEDICATION    [] POST OP NAUSEA        []  THE PATIENT WAS GIVEN RX FOR:                  []  THE PATIENT WAS ADVISED:                  []  OTHER:               ON 2 -4 WEEK POST-OP CHECK  []  PRE-OP LEG PAIN IMPROVED  []  PRE-OP LEG PAIN UNCHANGED    PHYSICAL EXAMINATION  [x]  INCISIONS ARE APPROXIMATED WITHOUT SIGNS OF INFECTION  []  INFECTION NOTED DURING EXAM    ECCHYMOSIS   SWELLING        LOCATION:       [x]  MINIMAL   [x]  MINIMAL        []  ANTIBIOTICS GIVEN:     []  MODERATE   []  MODERATE  []  RESIDUAL VARICOSITIES     []  SIGNIFICANT  []  SIGNIFICANT       LOCATION:       []  RESOLVED   []  RESOLVED  [x]  PARATHESIAS/COMMENTS:  None noted            COMMENTS/NOTES:  Good ablation bilaterally. No deep extension.  Feels good.                     FOLLOW-UP:  [x]  POST-OP INSTRUCTIONS REVIEWED WITH THE PATIENT AND QUESTIONS ANSWERRED  [x]  ROUTINE WITH OPERATING PHYSICIAN 2 weeks   [] PRN FOR SCLEROTHERAPY  []  PRN FOR SLERO /VEIN GOGH    [] PRNVEIN GOGH  []  PRN  []  OTHER:                    XXX I recommended wearing 15/20 mmHg TH hose for 48 more hours straight then daily for 4-6 weeks during daily activity. May progressively resume all activities. Answered all questions.    Greg Campos MD

## 2025-04-11 ENCOUNTER — TRANSCRIBE ORDERS (OUTPATIENT)
Dept: ADMINISTRATIVE | Age: 55
End: 2025-04-11

## 2025-04-11 DIAGNOSIS — N50.9 TESTICULAR LESION: Primary | ICD-10-CM

## 2025-04-25 ENCOUNTER — OFFICE VISIT (OUTPATIENT)
Dept: VASCULAR SURGERY | Age: 55
End: 2025-04-25

## 2025-04-25 VITALS
SYSTOLIC BLOOD PRESSURE: 122 MMHG | HEIGHT: 69 IN | DIASTOLIC BLOOD PRESSURE: 90 MMHG | HEART RATE: 66 BPM | OXYGEN SATURATION: 99 % | WEIGHT: 247 LBS | BODY MASS INDEX: 36.58 KG/M2

## 2025-04-25 DIAGNOSIS — I83.893 SYMPTOMATIC VARICOSE VEINS OF BOTH LOWER EXTREMITIES: Primary | ICD-10-CM

## 2025-04-25 PROCEDURE — 99024 POSTOP FOLLOW-UP VISIT: CPT | Performed by: SURGERY

## 2025-04-25 NOTE — PROGRESS NOTES
[facility]         Post-op Check/Notes  Date: [unfilled]   Name: Charlene Cid  []  RE-WRAP [x] 3 WK POST-OP []OTHER      SURGEON anthony   PROCEDURE:  R BK GSV RFA + R LSV RFA + L GSV RFA + L LSV RFA + B stabs  DAYS POST-OP    SURG.DATE  4/1/2025    ANESTHESIA: [x]  GENERAL [] EPIDURAL  HISTORY:   [x]  PATIENT IS AMBULATORY  [x]  PATIENT HAS NO COMPLAINTS AND INDICATES THAT HE/SHE IS DOING FINE  []  PATIENT EXPRESSED SOME DIFFICULTIES WITH:   [] PAIN MEDICATION:              []  THE MEDICATION WAS INTOLERABLE        []  THE MEDICATION WAS NOT EFFECTIVE        [] THE PATIENT WAS GIVEN A NEW RX FOR:                [] THE PATIENT DECIDED TO TOLERATE PAIN WITHOUT MEDICATION    [] POST OP NAUSEA        []  THE PATIENT WAS GIVEN RX FOR:                  []  THE PATIENT WAS ADVISED:                  []  OTHER:               ON 2 -4 WEEK POST-OP CHECK  []  PRE-OP LEG PAIN IMPROVED  []  PRE-OP LEG PAIN UNCHANGED    PHYSICAL EXAMINATION  [x]  INCISIONS ARE APPROXIMATED WITHOUT SIGNS OF INFECTION  []  INFECTION NOTED DURING EXAM    ECCHYMOSIS   SWELLING        LOCATION:       []  MINIMAL   []  MINIMAL        []  ANTIBIOTICS GIVEN:     []  MODERATE   []  MODERATE  []  RESIDUAL VARICOSITIES     []  SIGNIFICANT  []  SIGNIFICANT       LOCATION:       [x]  RESOLVED   [x]  RESOLVED  [x]  PARATHESIAS/COMMENTS:  Slight lateral L ankle           COMMENTS/NOTES:  Some tightness and discomfort with stretching.  Feels good overall                     FOLLOW-UP:  [x]  POST-OP INSTRUCTIONS REVIEWED WITH THE PATIENT AND QUESTIONS ANSWERRED  [x]  ROUTINE WITH OPERATING PHYSICIAN 4-6 weeks   [] PRN FOR SCLEROTHERAPY  []  PRN FOR SLERO /VEIN GOGH    [] PRNVEIN GOGH  []  PRN  []  OTHER:                    XXX I recommended continued wearing 15/20 mmHg TH hose for 4-6 weeks during daily activity. May progressively resume all activities including all lifting. Warm compresses for hard areas. Reassured about numbness - understands. Answered all

## 2025-05-05 ENCOUNTER — HOSPITAL ENCOUNTER (OUTPATIENT)
Dept: MRI IMAGING | Age: 55
Discharge: HOME | End: 2025-05-05
Payer: COMMERCIAL

## 2025-05-05 DIAGNOSIS — N50.9 TESTICULAR LESION: ICD-10-CM

## 2025-05-05 PROCEDURE — 6360000004 HC RX CONTRAST MEDICATION

## 2025-05-05 PROCEDURE — 72197 MRI PELVIS W/O & W/DYE: CPT

## 2025-05-05 PROCEDURE — A9579 GAD-BASE MR CONTRAST NOS,1ML: HCPCS

## 2025-05-05 RX ADMIN — GADOTERIDOL 20 ML: 279.3 INJECTION, SOLUTION INTRAVENOUS at 08:45

## 2025-05-23 ENCOUNTER — TELEPHONE (OUTPATIENT)
Dept: PRIMARY CARE CLINIC | Age: 55
End: 2025-05-23

## 2025-06-26 ENCOUNTER — OFFICE VISIT (OUTPATIENT)
Dept: VASCULAR SURGERY | Age: 55
End: 2025-06-26

## 2025-06-26 VITALS
SYSTOLIC BLOOD PRESSURE: 126 MMHG | HEART RATE: 66 BPM | OXYGEN SATURATION: 97 % | BODY MASS INDEX: 36.16 KG/M2 | DIASTOLIC BLOOD PRESSURE: 74 MMHG | WEIGHT: 245 LBS

## 2025-06-26 DIAGNOSIS — I83.893 SYMPTOMATIC VARICOSE VEINS OF BOTH LOWER EXTREMITIES: Primary | ICD-10-CM

## 2025-06-26 PROCEDURE — 99024 POSTOP FOLLOW-UP VISIT: CPT | Performed by: SURGERY

## 2025-06-26 NOTE — PROGRESS NOTES
[facility]         Post-op Check/Notes  Date: [unfilled]   Name: Charlene Cid  []  RE-WRAP [x] 2.5 momths POST-OP []OTHER      SURGEON gcz   PROCEDURE:  R BK GSV RFA + R LSV RFA + L GSV RFA + L LSV RFA + B stabs  DAYS POST-OP    SURG.DATE  4/1/2025    ANESTHESIA: [x]  GENERAL [] EPIDURAL  HISTORY:   [x]  PATIENT IS AMBULATORY  [x]  PATIENT HAS NO COMPLAINTS AND INDICATES THAT HE/SHE IS DOING FINE  []  PATIENT EXPRESSED SOME DIFFICULTIES WITH:   [] PAIN MEDICATION:              []  THE MEDICATION WAS INTOLERABLE        []  THE MEDICATION WAS NOT EFFECTIVE        [] THE PATIENT WAS GIVEN A NEW RX FOR:                [] THE PATIENT DECIDED TO TOLERATE PAIN WITHOUT MEDICATION    [] POST OP NAUSEA        []  THE PATIENT WAS GIVEN RX FOR:                  []  THE PATIENT WAS ADVISED:                  []  OTHER:               ON 2.5 months POST-OP CHECK  [x]  PRE-OP LEG PAIN IMPROVED - great  []  PRE-OP LEG PAIN UNCHANGED    PHYSICAL EXAMINATION  [x]  INCISIONS ARE APPROXIMATED WITHOUT SIGNS OF INFECTION  []  INFECTION NOTED DURING EXAM    ECCHYMOSIS   SWELLING        LOCATION:       []  MINIMAL   []  MINIMAL        []  ANTIBIOTICS GIVEN:     []  MODERATE   []  MODERATE  []  RESIDUAL VARICOSITIES     []  SIGNIFICANT  []  SIGNIFICANT       LOCATION:       [x]  RESOLVED   [x]  RESOLVED  [x]  PARATHESIAS/COMMENTS:  Well demarcated lateral L ankle           COMMENTS/NOTES:    Feels good overall. Very pleased                     FOLLOW-UP:  [x]  POST-OP INSTRUCTIONS REVIEWED WITH THE PATIENT AND QUESTIONS ANSWERRED  []  ROUTINE WITH OPERATING PHYSICIAN    [] PRN FOR SCLEROTHERAPY  []  PRN FOR SLERO /VEIN GOGH    [] PRNVEIN GOGH  [x]  PRN  []  OTHER:                    XXX I recommended continued wearing 15/20 mmHg KH hose during daily activity. No activity restrictions. Reassured about numbness - understands. Answered all questions.    Greg Campos MD

## (undated) DEVICE — TOWEL,STOP FLAG GOLD N-W: Brand: MEDLINE

## (undated) DEVICE — SUTURE VCRL PLUS SZ 0 54IN TIE VCP608H

## (undated) DEVICE — SPONGE,LAP,4"X18",XR,ST,5/PK,40PK/CS: Brand: MEDLINE INDUSTRIES, INC.

## (undated) DEVICE — SHEATH 1912000 5PK 4MM/0DEG STORZ XOMED: Brand: ENDO-SCRUB®

## (undated) DEVICE — RELOAD STPL L60MM H1.5-3.6MM REG TISS BLU GRIPPING SURF B

## (undated) DEVICE — TROCAR: Brand: KII FIOS FIRST ENTRY

## (undated) DEVICE — SOLUTION IV 1000ML 0.9% SOD CHL

## (undated) DEVICE — TUBING, SUCTION, 1/4" X 10', STRAIGHT: Brand: MEDLINE

## (undated) DEVICE — GOWN,AURORA,NONREINF,RAGLAN,XXL,STERILE: Brand: MEDLINE

## (undated) DEVICE — NASAL FESS: Brand: MEDLINE INDUSTRIES, INC.

## (undated) DEVICE — PASSIVE LAPSCP FLTR W/ 1/4INX24 TBNG AND M LUER LCK FIT - U

## (undated) DEVICE — AIR SHEET,LAT,COMFORT GLIDE, BLEND 40X80: Brand: MEDLINE

## (undated) DEVICE — SOLUTION ANTIFOG VIS SYS CLEARIFY LAPSCP

## (undated) DEVICE — TUBING, SUCTION, 1/4" X 12', STRAIGHT: Brand: MEDLINE

## (undated) DEVICE — SHEET,DRAPE,53X77,STERILE: Brand: MEDLINE

## (undated) DEVICE — BANDAGE,GAUZE,4.5"X4.1YD,STERILE,LF: Brand: MEDLINE

## (undated) DEVICE — MERCY FAIRFIELD TURNOVER KIT: Brand: MEDLINE INDUSTRIES, INC.

## (undated) DEVICE — BLANKET WRM W29.9XL79.1IN UP BODY FORC AIR MISTRAL-AIR

## (undated) DEVICE — BOWL MED L 32OZ PLAS W/ MOLD GRAD EZ OPN PEEL PCH

## (undated) DEVICE — SUTURE CHROMIC GUT SZ 5-0 L18IN ABSRB BRN P-3 L13MM 3/8 CIR 687G

## (undated) DEVICE — SHEARS ENDOSCP HARM 36CM ULTRASONIC CRV TIP UPGRD

## (undated) DEVICE — LOTION PREP REMV 5OZ IODO CLR TINC OF BENZ DURAPREP

## (undated) DEVICE — DECANTER BAG 9": Brand: MEDLINE INDUSTRIES, INC.

## (undated) DEVICE — APPLICATOR PREP 26ML 0.7% IOD POVACRYLEX 74% ISO ALC ST

## (undated) DEVICE — ARM CRADLE: Brand: DEVON

## (undated) DEVICE — SPLINT 1524055 DOYLE II AIRWAY SET: Brand: DOYLE II ™

## (undated) DEVICE — GLOVE SURG SZ 65 L12IN FNGR THK79MIL GRN LTX FREE

## (undated) DEVICE — BLADE CLIPPER GEN PURP NS

## (undated) DEVICE — DRAPE,REIN 53X77,STERILE: Brand: MEDLINE

## (undated) DEVICE — INTENDED FOR TISSUE SEPARATION, AND OTHER PROCEDURES THAT REQUIRE A SHARP SURGICAL BLADE TO PUNCTURE OR CUT.: Brand: BARD-PARKER ® CARBON RIB-BACK BLADES

## (undated) DEVICE — TOWEL,OR,DSP,ST,BLUE,STD,8/PK,10PK/CS: Brand: MEDLINE

## (undated) DEVICE — DEVICE SUT W/ SZ 0 L48IN VLT POLYSRB SUT DISP ES-9 ENDO

## (undated) DEVICE — GLOVE SURG SZ 8 L12IN FNGR THK94MIL STD WHT LTX FREE

## (undated) DEVICE — GLOVE,SURG,SENSICARE,ALOE,LF,PF,7: Brand: MEDLINE

## (undated) DEVICE — ENDOSCOPIC KIT 6X3/16 FT COLON W/ 1.1 OZ 2 GWN W/O BRSH

## (undated) DEVICE — SURGICAL PROCEDURE PACK IV U-BAR

## (undated) DEVICE — NEEDLE,22GX1.5",REG,BEVEL: Brand: MEDLINE

## (undated) DEVICE — SOLUTION IRRIG 1000ML 0.9% SOD CHL USP POUR PLAS BTL

## (undated) DEVICE — GEL US 20GM NONIRRITATING OVERWRAPPED FILE PCH TRNSMIT

## (undated) DEVICE — DRAPE,LAP,CHOLE,W/TROUGHS,STERILE: Brand: MEDLINE

## (undated) DEVICE — DRAPE IRRIG FLD WRM W44XL44IN W/ AORN STD PRTBL INTRATEMP

## (undated) DEVICE — FORCEPS BX L240CM WRK CHN 2.8MM STD CAP W/ NDL MIC MESH

## (undated) DEVICE — SOLUTION IV 500 ML 0.9 NACL INJ EXCEL CONTAINER USP LF

## (undated) DEVICE — STAPLER 60MM POWERED ECHELON 3000 LONG 440MM

## (undated) DEVICE — SUTURE ABSORBABLE MONOFILAMENT 4-0 SC-1 18 IN PLN GUT 1824H

## (undated) DEVICE — 3M™ STERI-STRIP™ REINFORCED ADHESIVE SKIN CLOSURES, R1547, 1/2 IN X 4 IN (12 MM X 100 MM), 6 STRIPS/ENVELOPE: Brand: 3M™ STERI-STRIP™

## (undated) DEVICE — CRADLE ANK AND FT ELEV FLAT END POLY FOAM W/ VENT H NEUT

## (undated) DEVICE — MITT SURG PREP L ADH DISPOSABLE

## (undated) DEVICE — Device: Brand: MEDCO MANUFACTURING INC

## (undated) DEVICE — ELECTRODE ECG MONITR FOAM TEAR DROP ADLT RED

## (undated) DEVICE — LAPAROSCOPIC SCISSORS: Brand: EPIX LAPAROSCOPIC SCISSORS

## (undated) DEVICE — SOLUTION IV 1000 ML 0.9 NACL INJ USP EXCEL PLAS CONTAINER

## (undated) DEVICE — TOWEL,OR,DSP,ST,BLUE,DLX,8/PK,10PK/CS: Brand: MEDLINE

## (undated) DEVICE — CATHETER ABLAT 7FR L100CM 0.025IN ENDOVENOUS RF CLOSUREFAST

## (undated) DEVICE — AIR/WATER CLEANING ADAPTER FOR OLYMPUS® GI ENDOSCOPE: Brand: BULLDOG®

## (undated) DEVICE — GAUZE,SPONGE,4"X4",16PLY,STRL,LF,10/TRAY: Brand: MEDLINE

## (undated) DEVICE — BLADE,STAINLESS-STEEL,11,STRL,DISPOSABLE: Brand: MEDLINE

## (undated) DEVICE — TRUE CONTENT TO BE POPULATED AS PART OF REBRANDING: Brand: ARGYLE

## (undated) DEVICE — ENDO CARRY-ON PROCEDURE KIT INCLUDES SUCTION TUBING, LUBRICANT, GAUZE, BIOHAZARD STICKER, TRANSPORT PAD AND INTERCEPT BEDSIDE KIT.: Brand: ENDO CARRY-ON PROCEDURE KIT

## (undated) DEVICE — GLOVE ORANGE PI 8 1/2   MSG9085

## (undated) DEVICE — GOWN,SIRUS,NONRNF,SETINSLV,XL,20/CS: Brand: MEDLINE

## (undated) DEVICE — PROVE COVER: Brand: UNBRANDED

## (undated) DEVICE — SOLUTION IV IRRIG WATER 500ML POUR BRL ST 2F7113

## (undated) DEVICE — BW-412T DISP COMBO CLEANING BRUSH: Brand: SINGLE USE COMBINATION CLEANING BRUSH

## (undated) DEVICE — SYRINGE MED 10ML TRNSLUC BRL PLUNG BLK MRK POLYPR CTRL

## (undated) DEVICE — Z DISCONTINUED USE 2276105 GOWN PROTCT UNIV CHST W28IN L49IN SL 24IN BLU SPUNBOND FLM

## (undated) DEVICE — RELOAD STPL L60MM H1-2.6MM MESENTERY THN TISS WHT 6 ROW

## (undated) DEVICE — Device

## (undated) DEVICE — SYRINGE MED 10ML LUERLOCK TIP W/O SFTY DISP

## (undated) DEVICE — DRESSING HEMSTAT W1X2IN ABSRB SURGCEL SNOW

## (undated) DEVICE — KIT MICROINTRODUCER 4FR ECHOGENIC NDL L7CM 21GA STIFF COAX

## (undated) DEVICE — SET INTRO SHTH MIC L7CM DIA7FR 0.018IN NDL L2.75IN DIA21GA

## (undated) DEVICE — VALVE SUCTION AIR H2O SET ORCA POD + DISP

## (undated) DEVICE — AIRLIFE™ NASAL OXYGEN CANNULA CURVED, NONFLARED TIP, WITH 7' FEET (2.1 M) CRUSH RESISTANT TUBING, OVER-THE-EAR STYLE: Brand: AIRLIFE™

## (undated) DEVICE — SHEET,DRAPE,40X58,STERILE: Brand: MEDLINE

## (undated) DEVICE — 3M™ COBAN™ NL STERILE NON-LATEX SELF-ADHERENT WRAP, 2084S, 4 IN X 5 YD (10 CM X 4,5 M), 18 ROLLS/CASE: Brand: 3M™ COBAN™

## (undated) DEVICE — NEEDLE HYPO 18GA L1.5IN PNK POLYPR HUB S STL REG BVL STR

## (undated) DEVICE — GLOVE SURG SZ 7 L12IN FNGR THK79MIL GRN LTX FREE

## (undated) DEVICE — MINOR SET UP PACK: Brand: MEDLINE INDUSTRIES, INC.

## (undated) DEVICE — APPLICATOR LAP 35 CM 2 RIGID VISTASEAL

## (undated) DEVICE — STANDARD HYPODERMIC NEEDLE,POLYPROPYLENE HUB: Brand: MONOJECT

## (undated) DEVICE — SPONGE LAP W18XL18IN WHT COT 4 PLY FLD STRUNG RADPQ DISP ST 2 PER PACK

## (undated) DEVICE — NEEDLE INSUF L150MM DIA2MM DISP FOR PNEUMOPERI ENDOPATH

## (undated) DEVICE — TROCARS: Brand: KII® OPTICAL ACCESS SYSTEM

## (undated) DEVICE — GLOVE SURG SZ 65 L12IN FNGR THK94MIL STD WHT LTX FREE

## (undated) DEVICE — GAUZE,SPONGE,4"X4",16PLY,XRAY,STRL,LF: Brand: MEDLINE

## (undated) DEVICE — KNIFE OPHTH W2.5MM 55DEG CRESC BVL UP ANG XSTAR

## (undated) DEVICE — SOLUTION SURG PREP 26 CC PURPREP

## (undated) DEVICE — GLOVE ORANGE PI 7   MSG9070

## (undated) DEVICE — SUTURE PERMA-HAND SZ 2-0 L30IN NONABSORBABLE BLK L26MM SH K833H

## (undated) DEVICE — LAPAROSCOPY PACK: Brand: MEDLINE INDUSTRIES, INC.

## (undated) DEVICE — DEVICE SUT SHFT L34CM DIA 10MM 2 JAW LD UNIT ENDOSTCH

## (undated) DEVICE — DRAPE,INSTRUMENT,MAGNETIC,10X16: Brand: MEDLINE

## (undated) DEVICE — GLOVE ORANGE PI 7 1/2   MSG9075

## (undated) DEVICE — TROCAR: Brand: KII OPTICAL ACCESS SYSTEM

## (undated) DEVICE — SUTURE PROL SZ 3-0 L36IN NONABSORBABLE BLU L26MM SH 1/2 CIR 8522H

## (undated) DEVICE — LIQUIBAND RAPID ADHESIVE 36/CS 0.8ML: Brand: MEDLINE

## (undated) DEVICE — SYRINGE MED 30ML STD CLR PLAS LUERLOCK TIP N CTRL DISP

## (undated) DEVICE — MOUTHPIECE ENDOSCP L CTRL OPN AND SIDE PORTS DISP

## (undated) DEVICE — SUTURE VCRL + SZ 4-0 L18IN ABSRB UD L19MM PS-2 3/8 CIR PRIM VCP496H